# Patient Record
Sex: FEMALE | Race: WHITE
[De-identification: names, ages, dates, MRNs, and addresses within clinical notes are randomized per-mention and may not be internally consistent; named-entity substitution may affect disease eponyms.]

---

## 2021-02-04 ENCOUNTER — TRANSCRIPTION ENCOUNTER (OUTPATIENT)
Age: 76
End: 2021-02-04

## 2023-08-09 ENCOUNTER — INPATIENT (INPATIENT)
Facility: HOSPITAL | Age: 78
LOS: 7 days | Discharge: NURSING, REHAB OR LONG-TERM CARE | DRG: 64 | End: 2023-08-17
Attending: INTERNAL MEDICINE | Admitting: FAMILY MEDICINE
Payer: MEDICARE

## 2023-08-09 VITALS
SYSTOLIC BLOOD PRESSURE: 213 MMHG | TEMPERATURE: 98 F | DIASTOLIC BLOOD PRESSURE: 94 MMHG | HEART RATE: 98 BPM | OXYGEN SATURATION: 99 % | RESPIRATION RATE: 20 BRPM | WEIGHT: 220.02 LBS

## 2023-08-09 DIAGNOSIS — G93.41 METABOLIC ENCEPHALOPATHY: ICD-10-CM

## 2023-08-09 DIAGNOSIS — B96.20 UNSPECIFIED ESCHERICHIA COLI [E. COLI] AS THE CAUSE OF DISEASES CLASSIFIED ELSEWHERE: ICD-10-CM

## 2023-08-09 DIAGNOSIS — I63.89 OTHER CEREBRAL INFARCTION: ICD-10-CM

## 2023-08-09 DIAGNOSIS — N18.32 CHRONIC KIDNEY DISEASE, STAGE 3B: ICD-10-CM

## 2023-08-09 DIAGNOSIS — I66.02 OCCLUSION AND STENOSIS OF LEFT MIDDLE CEREBRAL ARTERY: ICD-10-CM

## 2023-08-09 DIAGNOSIS — R47.1 DYSARTHRIA AND ANARTHRIA: ICD-10-CM

## 2023-08-09 DIAGNOSIS — G93.40 ENCEPHALOPATHY, UNSPECIFIED: ICD-10-CM

## 2023-08-09 DIAGNOSIS — I67.2 CEREBRAL ATHEROSCLEROSIS: ICD-10-CM

## 2023-08-09 DIAGNOSIS — R47.81 SLURRED SPEECH: ICD-10-CM

## 2023-08-09 DIAGNOSIS — Z53.8 PROCEDURE AND TREATMENT NOT CARRIED OUT FOR OTHER REASONS: ICD-10-CM
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
## 2023-08-09 DIAGNOSIS — I12.9 HYPERTENSIVE CHRONIC KIDNEY DISEASE WITH STAGE 1 THROUGH STAGE 4 CHRONIC KIDNEY DISEASE, OR UNSPECIFIED CHRONIC KIDNEY DISEASE: ICD-10-CM

## 2023-08-09 DIAGNOSIS — N17.9 ACUTE KIDNEY FAILURE, UNSPECIFIED: ICD-10-CM
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
## 2023-08-09 DIAGNOSIS — S82.61XA DISPLACED FRACTURE OF LATERAL MALLEOLUS OF RIGHT FIBULA, INITIAL ENCOUNTER FOR CLOSED FRACTURE: ICD-10-CM

## 2023-08-09 DIAGNOSIS — I16.1 HYPERTENSIVE EMERGENCY: ICD-10-CM

## 2023-08-09 DIAGNOSIS — R29.704 NIHSS SCORE 4: ICD-10-CM

## 2023-08-09 DIAGNOSIS — R29.702 NIHSS SCORE 2: ICD-10-CM

## 2023-08-09 DIAGNOSIS — N39.0 URINARY TRACT INFECTION, SITE NOT SPECIFIED: ICD-10-CM

## 2023-08-09 DIAGNOSIS — R47.01 APHASIA: ICD-10-CM

## 2023-08-09 DIAGNOSIS — Z86.73 PERSONAL HISTORY OF TRANSIENT ISCHEMIC ATTACK (TIA), AND CEREBRAL INFARCTION WITHOUT RESIDUAL DEFICITS: ICD-10-CM

## 2023-08-09 DIAGNOSIS — I63.49 CEREBRAL INFARCTION DUE TO EMBOLISM OF OTHER CEREBRAL ARTERY: ICD-10-CM

## 2023-08-09 LAB
BASOPHILS # BLD AUTO: 0.07 K/UL — SIGNIFICANT CHANGE UP (ref 0–0.2)
BASOPHILS NFR BLD AUTO: 0.4 % — SIGNIFICANT CHANGE UP (ref 0–1)
EOSINOPHIL # BLD AUTO: 0.16 K/UL — SIGNIFICANT CHANGE UP (ref 0–0.7)
EOSINOPHIL NFR BLD AUTO: 1 % — SIGNIFICANT CHANGE UP (ref 0–8)
HCT VFR BLD CALC: 45.3 % — SIGNIFICANT CHANGE UP (ref 37–47)
HGB BLD-MCNC: 15.1 G/DL — SIGNIFICANT CHANGE UP (ref 12–16)
IMM GRANULOCYTES NFR BLD AUTO: 0.3 % — SIGNIFICANT CHANGE UP (ref 0.1–0.3)
LYMPHOCYTES # BLD AUTO: 1.47 K/UL — SIGNIFICANT CHANGE UP (ref 1.2–3.4)
LYMPHOCYTES # BLD AUTO: 9.4 % — LOW (ref 20.5–51.1)
MCHC RBC-ENTMCNC: 29.6 PG — SIGNIFICANT CHANGE UP (ref 27–31)
MCHC RBC-ENTMCNC: 33.3 G/DL — SIGNIFICANT CHANGE UP (ref 32–37)
MCV RBC AUTO: 88.8 FL — SIGNIFICANT CHANGE UP (ref 81–99)
MONOCYTES # BLD AUTO: 1.14 K/UL — HIGH (ref 0.1–0.6)
MONOCYTES NFR BLD AUTO: 7.3 % — SIGNIFICANT CHANGE UP (ref 1.7–9.3)
NEUTROPHILS # BLD AUTO: 12.76 K/UL — HIGH (ref 1.4–6.5)
NEUTROPHILS NFR BLD AUTO: 81.6 % — HIGH (ref 42.2–75.2)
NRBC # BLD: 0 /100 WBCS — SIGNIFICANT CHANGE UP (ref 0–0)
PLATELET # BLD AUTO: 249 K/UL — SIGNIFICANT CHANGE UP (ref 130–400)
PMV BLD: 10.4 FL — SIGNIFICANT CHANGE UP (ref 7.4–10.4)
RBC # BLD: 5.1 M/UL — SIGNIFICANT CHANGE UP (ref 4.2–5.4)
RBC # FLD: 13.3 % — SIGNIFICANT CHANGE UP (ref 11.5–14.5)
WBC # BLD: 15.64 K/UL — HIGH (ref 4.8–10.8)
WBC # FLD AUTO: 15.64 K/UL — HIGH (ref 4.8–10.8)

## 2023-08-09 PROCEDURE — 93010 ELECTROCARDIOGRAM REPORT: CPT

## 2023-08-09 PROCEDURE — 99285 EMERGENCY DEPT VISIT HI MDM: CPT

## 2023-08-10 DIAGNOSIS — R41.82 ALTERED MENTAL STATUS, UNSPECIFIED: ICD-10-CM

## 2023-08-10 LAB
ALBUMIN SERPL ELPH-MCNC: 4.2 G/DL — SIGNIFICANT CHANGE UP (ref 3.5–5.2)
ALP SERPL-CCNC: 117 U/L — HIGH (ref 30–115)
ALT FLD-CCNC: 17 U/L — SIGNIFICANT CHANGE UP (ref 0–41)
ANION GAP SERPL CALC-SCNC: 11 MMOL/L — SIGNIFICANT CHANGE UP (ref 7–14)
APPEARANCE UR: CLEAR — SIGNIFICANT CHANGE UP
AST SERPL-CCNC: 24 U/L — SIGNIFICANT CHANGE UP (ref 0–41)
BACTERIA # UR AUTO: ABNORMAL /HPF
BASE EXCESS BLDV CALC-SCNC: 2.1 MMOL/L — SIGNIFICANT CHANGE UP (ref -2–3)
BILIRUB SERPL-MCNC: 0.3 MG/DL — SIGNIFICANT CHANGE UP (ref 0.2–1.2)
BILIRUB UR-MCNC: NEGATIVE — SIGNIFICANT CHANGE UP
BUN SERPL-MCNC: 27 MG/DL — HIGH (ref 10–20)
CA-I SERPL-SCNC: 1.21 MMOL/L — SIGNIFICANT CHANGE UP (ref 1.15–1.33)
CALCIUM SERPL-MCNC: 9.6 MG/DL — SIGNIFICANT CHANGE UP (ref 8.4–10.5)
CHLORIDE SERPL-SCNC: 108 MMOL/L — SIGNIFICANT CHANGE UP (ref 98–110)
CO2 SERPL-SCNC: 24 MMOL/L — SIGNIFICANT CHANGE UP (ref 17–32)
COLOR SPEC: YELLOW — SIGNIFICANT CHANGE UP
CREAT SERPL-MCNC: 1.5 MG/DL — SIGNIFICANT CHANGE UP (ref 0.7–1.5)
DIFF PNL FLD: ABNORMAL
EGFR: 36 ML/MIN/1.73M2 — LOW
EPI CELLS # UR: ABNORMAL /HPF (ref 0–5)
FLUAV AG NPH QL: SIGNIFICANT CHANGE UP
FLUBV AG NPH QL: SIGNIFICANT CHANGE UP
GAS PNL BLDV: 139 MMOL/L — SIGNIFICANT CHANGE UP (ref 136–145)
GAS PNL BLDV: SIGNIFICANT CHANGE UP
GLUCOSE SERPL-MCNC: 116 MG/DL — HIGH (ref 70–99)
GLUCOSE UR QL: NEGATIVE MG/DL — SIGNIFICANT CHANGE UP
HCO3 BLDV-SCNC: 28 MMOL/L — SIGNIFICANT CHANGE UP (ref 22–29)
HCT VFR BLDA CALC: 47 % — SIGNIFICANT CHANGE UP (ref 39–51)
HGB BLD CALC-MCNC: 15.5 G/DL — SIGNIFICANT CHANGE UP (ref 12.6–17.4)
KETONES UR-MCNC: NEGATIVE — SIGNIFICANT CHANGE UP
LACTATE BLDV-MCNC: 1.8 MMOL/L — SIGNIFICANT CHANGE UP (ref 0.5–2)
LEUKOCYTE ESTERASE UR-ACNC: NEGATIVE — SIGNIFICANT CHANGE UP
LIDOCAIN IGE QN: 37 U/L — SIGNIFICANT CHANGE UP (ref 7–60)
MAGNESIUM SERPL-MCNC: 2.1 MG/DL — SIGNIFICANT CHANGE UP (ref 1.8–2.4)
NITRITE UR-MCNC: POSITIVE
PCO2 BLDV: 46 MMHG — HIGH (ref 39–42)
PH BLDV: 7.39 — SIGNIFICANT CHANGE UP (ref 7.32–7.43)
PH UR: 7 — SIGNIFICANT CHANGE UP (ref 5–8)
PO2 BLDV: 40 MMHG — SIGNIFICANT CHANGE UP
POTASSIUM BLDV-SCNC: 4.3 MMOL/L — SIGNIFICANT CHANGE UP (ref 3.5–5.1)
POTASSIUM SERPL-MCNC: 4.8 MMOL/L — SIGNIFICANT CHANGE UP (ref 3.5–5)
POTASSIUM SERPL-SCNC: 4.8 MMOL/L — SIGNIFICANT CHANGE UP (ref 3.5–5)
PROT SERPL-MCNC: 7.2 G/DL — SIGNIFICANT CHANGE UP (ref 6–8)
PROT UR-MCNC: NEGATIVE MG/DL — SIGNIFICANT CHANGE UP
RBC CASTS # UR COMP ASSIST: ABNORMAL /HPF (ref 0–4)
RSV RNA NPH QL NAA+NON-PROBE: SIGNIFICANT CHANGE UP
SAO2 % BLDV: 67.7 % — SIGNIFICANT CHANGE UP
SARS-COV-2 RNA SPEC QL NAA+PROBE: SIGNIFICANT CHANGE UP
SODIUM SERPL-SCNC: 143 MMOL/L — SIGNIFICANT CHANGE UP (ref 135–146)
SP GR SPEC: 1.01 — SIGNIFICANT CHANGE UP (ref 1.01–1.03)
TROPONIN T SERPL-MCNC: <0.01 NG/ML — SIGNIFICANT CHANGE UP
UROBILINOGEN FLD QL: 0.2 MG/DL — SIGNIFICANT CHANGE UP
WBC UR QL: ABNORMAL /HPF (ref 0–5)

## 2023-08-10 PROCEDURE — 93306 TTE W/DOPPLER COMPLETE: CPT

## 2023-08-10 PROCEDURE — 70498 CT ANGIOGRAPHY NECK: CPT | Mod: 26,MA

## 2023-08-10 PROCEDURE — 80061 LIPID PANEL: CPT

## 2023-08-10 PROCEDURE — 86225 DNA ANTIBODY NATIVE: CPT

## 2023-08-10 PROCEDURE — 70496 CT ANGIOGRAPHY HEAD: CPT | Mod: 26,MA

## 2023-08-10 PROCEDURE — 80053 COMPREHEN METABOLIC PANEL: CPT

## 2023-08-10 PROCEDURE — 92610 EVALUATE SWALLOWING FUNCTION: CPT | Mod: GN

## 2023-08-10 PROCEDURE — 86147 CARDIOLIPIN ANTIBODY EA IG: CPT

## 2023-08-10 PROCEDURE — 86780 TREPONEMA PALLIDUM: CPT

## 2023-08-10 PROCEDURE — 83036 HEMOGLOBIN GLYCOSYLATED A1C: CPT

## 2023-08-10 PROCEDURE — 93306 TTE W/DOPPLER COMPLETE: CPT | Mod: 26

## 2023-08-10 PROCEDURE — 80048 BASIC METABOLIC PNL TOTAL CA: CPT

## 2023-08-10 PROCEDURE — 86235 NUCLEAR ANTIGEN ANTIBODY: CPT

## 2023-08-10 PROCEDURE — 85025 COMPLETE CBC W/AUTO DIFF WBC: CPT

## 2023-08-10 PROCEDURE — 86803 HEPATITIS C AB TEST: CPT

## 2023-08-10 PROCEDURE — 86140 C-REACTIVE PROTEIN: CPT

## 2023-08-10 PROCEDURE — 82746 ASSAY OF FOLIC ACID SERUM: CPT

## 2023-08-10 PROCEDURE — 70450 CT HEAD/BRAIN W/O DYE: CPT | Mod: 26,XU,MA

## 2023-08-10 PROCEDURE — 97110 THERAPEUTIC EXERCISES: CPT | Mod: GO

## 2023-08-10 PROCEDURE — 70553 MRI BRAIN STEM W/O & W/DYE: CPT | Mod: 26

## 2023-08-10 PROCEDURE — 71045 X-RAY EXAM CHEST 1 VIEW: CPT | Mod: 26

## 2023-08-10 PROCEDURE — 70553 MRI BRAIN STEM W/O & W/DYE: CPT

## 2023-08-10 PROCEDURE — 83735 ASSAY OF MAGNESIUM: CPT

## 2023-08-10 PROCEDURE — 97535 SELF CARE MNGMENT TRAINING: CPT | Mod: GO

## 2023-08-10 PROCEDURE — 85652 RBC SED RATE AUTOMATED: CPT

## 2023-08-10 PROCEDURE — 97116 GAIT TRAINING THERAPY: CPT | Mod: GP

## 2023-08-10 PROCEDURE — 86255 FLUORESCENT ANTIBODY SCREEN: CPT

## 2023-08-10 PROCEDURE — 99223 1ST HOSP IP/OBS HIGH 75: CPT

## 2023-08-10 PROCEDURE — 73600 X-RAY EXAM OF ANKLE: CPT | Mod: RT

## 2023-08-10 PROCEDURE — 86431 RHEUMATOID FACTOR QUANT: CPT

## 2023-08-10 PROCEDURE — 36415 COLL VENOUS BLD VENIPUNCTURE: CPT

## 2023-08-10 PROCEDURE — 92507 TX SP LANG VOICE COMM INDIV: CPT | Mod: GN

## 2023-08-10 PROCEDURE — 95819 EEG AWAKE AND ASLEEP: CPT

## 2023-08-10 PROCEDURE — 92523 SPEECH SOUND LANG COMPREHEN: CPT | Mod: GN

## 2023-08-10 PROCEDURE — 97165 OT EVAL LOW COMPLEX 30 MIN: CPT | Mod: GO

## 2023-08-10 PROCEDURE — 99497 ADVNCD CARE PLAN 30 MIN: CPT | Mod: 25

## 2023-08-10 PROCEDURE — 97162 PT EVAL MOD COMPLEX 30 MIN: CPT | Mod: GP

## 2023-08-10 PROCEDURE — 84443 ASSAY THYROID STIM HORMONE: CPT

## 2023-08-10 PROCEDURE — 82607 VITAMIN B-12: CPT

## 2023-08-10 PROCEDURE — 92526 ORAL FUNCTION THERAPY: CPT | Mod: GN

## 2023-08-10 RX ORDER — HEPARIN SODIUM 5000 [USP'U]/ML
5000 INJECTION INTRAVENOUS; SUBCUTANEOUS EVERY 12 HOURS
Refills: 0 | Status: DISCONTINUED | OUTPATIENT
Start: 2023-08-10 | End: 2023-08-17

## 2023-08-10 RX ORDER — SODIUM CHLORIDE 9 MG/ML
1000 INJECTION INTRAMUSCULAR; INTRAVENOUS; SUBCUTANEOUS
Refills: 0 | Status: DISCONTINUED | OUTPATIENT
Start: 2023-08-10 | End: 2023-08-17

## 2023-08-10 RX ORDER — CLOPIDOGREL BISULFATE 75 MG/1
75 TABLET, FILM COATED ORAL DAILY
Refills: 0 | Status: DISCONTINUED | OUTPATIENT
Start: 2023-08-11 | End: 2023-08-17

## 2023-08-10 RX ORDER — AMLODIPINE BESYLATE 2.5 MG/1
2.5 TABLET ORAL DAILY
Refills: 0 | Status: DISCONTINUED | OUTPATIENT
Start: 2023-08-10 | End: 2023-08-10

## 2023-08-10 RX ORDER — ACETAMINOPHEN 500 MG
650 TABLET ORAL EVERY 6 HOURS
Refills: 0 | Status: DISCONTINUED | OUTPATIENT
Start: 2023-08-10 | End: 2023-08-17

## 2023-08-10 RX ORDER — ASPIRIN/CALCIUM CARB/MAGNESIUM 324 MG
81 TABLET ORAL DAILY
Refills: 0 | Status: DISCONTINUED | OUTPATIENT
Start: 2023-08-10 | End: 2023-08-17

## 2023-08-10 RX ORDER — CEFTRIAXONE 500 MG/1
1000 INJECTION, POWDER, FOR SOLUTION INTRAMUSCULAR; INTRAVENOUS EVERY 24 HOURS
Refills: 0 | Status: COMPLETED | OUTPATIENT
Start: 2023-08-10 | End: 2023-08-13

## 2023-08-10 RX ORDER — LABETALOL HCL 100 MG
10 TABLET ORAL ONCE
Refills: 0 | Status: COMPLETED | OUTPATIENT
Start: 2023-08-10 | End: 2023-08-11

## 2023-08-10 RX ORDER — CLOPIDOGREL BISULFATE 75 MG/1
300 TABLET, FILM COATED ORAL ONCE
Refills: 0 | Status: COMPLETED | OUTPATIENT
Start: 2023-08-10 | End: 2023-08-10

## 2023-08-10 RX ORDER — CEFTRIAXONE 500 MG/1
1000 INJECTION, POWDER, FOR SOLUTION INTRAMUSCULAR; INTRAVENOUS ONCE
Refills: 0 | Status: COMPLETED | OUTPATIENT
Start: 2023-08-10 | End: 2023-08-10

## 2023-08-10 RX ORDER — PANTOPRAZOLE SODIUM 20 MG/1
40 TABLET, DELAYED RELEASE ORAL
Refills: 0 | Status: DISCONTINUED | OUTPATIENT
Start: 2023-08-10 | End: 2023-08-17

## 2023-08-10 RX ORDER — AMLODIPINE BESYLATE 2.5 MG/1
5 TABLET ORAL DAILY
Refills: 0 | Status: DISCONTINUED | OUTPATIENT
Start: 2023-08-11 | End: 2023-08-16

## 2023-08-10 RX ORDER — ONDANSETRON 8 MG/1
4 TABLET, FILM COATED ORAL EVERY 6 HOURS
Refills: 0 | Status: DISCONTINUED | OUTPATIENT
Start: 2023-08-10 | End: 2023-08-17

## 2023-08-10 RX ORDER — ATORVASTATIN CALCIUM 80 MG/1
80 TABLET, FILM COATED ORAL AT BEDTIME
Refills: 0 | Status: DISCONTINUED | OUTPATIENT
Start: 2023-08-10 | End: 2023-08-17

## 2023-08-10 RX ORDER — LABETALOL HCL 100 MG
5 TABLET ORAL ONCE
Refills: 0 | Status: COMPLETED | OUTPATIENT
Start: 2023-08-10 | End: 2023-08-10

## 2023-08-10 RX ORDER — SENNA PLUS 8.6 MG/1
2 TABLET ORAL AT BEDTIME
Refills: 0 | Status: DISCONTINUED | OUTPATIENT
Start: 2023-08-10 | End: 2023-08-17

## 2023-08-10 RX ORDER — AMLODIPINE BESYLATE 2.5 MG/1
2.5 TABLET ORAL ONCE
Refills: 0 | Status: COMPLETED | OUTPATIENT
Start: 2023-08-10 | End: 2023-08-10

## 2023-08-10 RX ADMIN — PANTOPRAZOLE SODIUM 40 MILLIGRAM(S): 20 TABLET, DELAYED RELEASE ORAL at 06:14

## 2023-08-10 RX ADMIN — HEPARIN SODIUM 5000 UNIT(S): 5000 INJECTION INTRAVENOUS; SUBCUTANEOUS at 06:14

## 2023-08-10 RX ADMIN — AMLODIPINE BESYLATE 2.5 MILLIGRAM(S): 2.5 TABLET ORAL at 06:14

## 2023-08-10 RX ADMIN — HEPARIN SODIUM 5000 UNIT(S): 5000 INJECTION INTRAVENOUS; SUBCUTANEOUS at 17:44

## 2023-08-10 RX ADMIN — CEFTRIAXONE 100 MILLIGRAM(S): 500 INJECTION, POWDER, FOR SOLUTION INTRAMUSCULAR; INTRAVENOUS at 02:27

## 2023-08-10 RX ADMIN — Medication 5 MILLIGRAM(S): at 21:42

## 2023-08-10 RX ADMIN — ATORVASTATIN CALCIUM 80 MILLIGRAM(S): 80 TABLET, FILM COATED ORAL at 21:18

## 2023-08-10 RX ADMIN — CLOPIDOGREL BISULFATE 300 MILLIGRAM(S): 75 TABLET, FILM COATED ORAL at 18:54

## 2023-08-10 NOTE — H&P ADULT - NSHPPHYSICALEXAM_GEN_ALL_CORE
Vital Signs Last 24 Hrs  T(C): 36.9 (10 Aug 2023 02:34), Max: 36.9 (10 Aug 2023 02:34)  T(F): 98.5 (10 Aug 2023 02:34), Max: 98.5 (10 Aug 2023 02:34)  HR: 82 (10 Aug 2023 02:34) (82 - 98)  BP: 177/79 (10 Aug 2023 02:34) (177/79 - 213/94)  BP(mean): --  RR: 20 (09 Aug 2023 22:33) (20 - 20)  SpO2: 98% (10 Aug 2023 02:34) (98% - 99%)    Parameters below as of 10 Aug 2023 02:34  Patient On (Oxygen Delivery Method): room air      PHYSICAL EXAM-  GENERAL: NAD,   HEAD:  Atraumatic, Normocephalic  EYES: EOMI, PERRLA, conjunctiva and sclera clear  NECK: Supple, No JVD, Normal thyroid  NERVOUS SYSTEM:  alert but confused  CHEST/LUNG: Clear to percussion bilaterally; No rales, rhonchi, wheezing, or rubs  HEART: Regular rate and rhythm; No murmurs, rubs, or gallops  ABDOMEN: Soft, Nontender, Nondistended; Bowel sounds present  EXTREMITIES:   No clubbing, cyanosis, or edema  SKIN: No rashes or lesions

## 2023-08-10 NOTE — H&P ADULT - CONVERSATION DETAILS
Current plan of care, and prognosis were discussed with son/HCP  in details, all option were discussed in details  including CPR procedure, shock procedure, and intubation procedure.   Explained that duration of machines/inbutaion/pressor are unknown, and they are based on the underline issue.   son/HCP  opted for full code with full understanding of what it involves in details  time 30min

## 2023-08-10 NOTE — CHART NOTE - NSCHARTNOTEFT_GEN_A_CORE
78 year old female with hx of prior UTI who has not seen a physician in 20 years, on no meds, no cognitive impairment at baseline per family, presents to ED with son yesterday with complaint of confusion that began 8/9 around noon. She had a UTI in 2021 where she presented similarly and resolved with treatment. BP uncontrolled on presentation 213/94, no fever, WBC 15, mild SINCERE, alk phos 117, + UA for nitrites. CTH with chronic L occipital infarct, multiple age indeterminate R coronal radiata lacunar infarcts, and indeterminate confluent hypodensity in R frontal subcortical white matter. CT angio head/neck revealed focal occlusion prox M2 L MCA, irregular M2 R MCA stenosis, multifocal stenosis bilateral PCA. On exam patient is aphasic, does not answer name, age, location, answers only "fine, okay" to all questions, follows some commands, able to move all extremities, intact facial symmetry. No meningeal signs. NIHSS 4 today.      #Aphasia- r/o acute CVA   -transfer to telemetry monitoring  - q8h neuro checks  -ECHO  - maintain systolic blood pressure 140-180 (ok to do q8h per floor guidelines)   - continue ASA 81, Lipitor 80 mg  - labs: lipids, hgb a1c, tsh, rpr, b12  - CTH and CT angio head/neck noted  - obtain MRI brain with and without contrast (gentle hydration)     #AMS  #UTI  -f/u UCx  -c/w rocephin for now  - treat UTI, if patient does not improve, will probably need LP    #Hypertensive emergency- improved   -initially 213/94   -started on norvasc 2.5; will be on IVF prior to getting contrast so increased to norvasc 5mg qd  -keep -180 with CTA findings/possible ischemic stroke        #SINCERE vs CKD 3B  -no baseline creatinine  -getting gentle hydration; continue for 12 hours  -monitor Cr    #Progress Note Handoff  Pending (specify):  transfer to telemetry, ECHO, MRI brain, neuro f/u, clinical improvement, if doesn't get better with antibiotics may need LP  Family discussion: Updated son Salvatore at [phone]  Disposition: ACUTE 78 year old female with hx of prior UTI who has not seen a physician in 20 years, on no meds, no cognitive impairment at baseline per family, presents to ED with son yesterday with complaint of confusion that began 8/9 around noon. She had a UTI in 2021 where she presented similarly and resolved with treatment. BP uncontrolled on presentation 213/94, no fever, WBC 15, mild SINCERE, alk phos 117, + UA for nitrites. CTH with chronic L occipital infarct, multiple age indeterminate R coronal radiata lacunar infarcts, and indeterminate confluent hypodensity in R frontal subcortical white matter. CT angio head/neck revealed focal occlusion prox M2 L MCA, irregular M2 R MCA stenosis, multifocal stenosis bilateral PCA. On exam patient is aphasic, does not answer name, age, location, answers only "fine, okay" to all questions, follows some commands, able to move all extremities, intact facial symmetry. No meningeal signs. NIHSS 4 today.      #Aphasia- r/o acute CVA   -transfer to telemetry monitoring  - q8h neuro checks  -ECHO  - maintain systolic blood pressure 140-180 (ok to do q8h per floor guidelines)   - continue ASA 81, Lipitor 80 mg  - labs: lipids, hgb a1c, tsh, rpr, b12  - CTH and CT angio head/neck noted  - obtain MRI brain with and without contrast (gentle hydration)   -EEG    #AMS  #UTI  -f/u UCx  -c/w rocephin for now  - treat UTI, if patient does not improve, will probably need LP    #Hypertensive emergency- improved   -initially 213/94   -started on norvasc 2.5; will be on IVF prior to getting contrast so increased to norvasc 5mg qd  -keep -180 with CTA findings/possible ischemic stroke        #SINCERE vs CKD 3B  -no baseline creatinine  -getting gentle hydration; continue for 12 hours  -monitor Cr    #Progress Note Handoff  Pending (specify):  transfer to telemetry, ECHO, MRI brain, neuro f/u, clinical improvement, if doesn't get better with antibiotics may need LP  Family discussion: Updated son Salvatore at [phone]  Disposition: ACUTE 78 year old female with hx of prior UTI who has not seen a physician in 20 years, on no meds, no cognitive impairment at baseline per family, presents to ED with son yesterday with complaint of confusion that began 8/9 around noon. She had a UTI in 2021 where she presented similarly and resolved with treatment. BP uncontrolled on presentation 213/94, no fever, WBC 15, mild SINCERE, alk phos 117, + UA for nitrites. CTH with chronic L occipital infarct, multiple age indeterminate R coronal radiata lacunar infarcts, and indeterminate confluent hypodensity in R frontal subcortical white matter. CT angio head/neck revealed focal occlusion prox M2 L MCA, irregular M2 R MCA stenosis, multifocal stenosis bilateral PCA. On exam patient is aphasic, does not answer name, age, location, answers only "fine, okay" to all questions, follows some commands, able to move all extremities, intact facial symmetry. No meningeal signs. NIHSS 4 today.      #Aphasia- r/o acute CVA   -transfer to telemetry monitoring  - q8h neuro checks  -ECHO  - maintain systolic blood pressure 140-180 (ok to do q8h per floor guidelines)   - continue ASA 81, Lipitor 80 mg  - labs: lipids, hgb a1c, tsh, rpr, b12  - CTH and CT angio head/neck noted  - obtain MRI brain with and without contrast (gentle hydration)   -EEG    #AMS  #UTI  -f/u UCx  -c/w rocephin for now  - treat UTI, if patient does not improve, will probably need LP    #Hypertensive emergency- improved   -initially 213/94   -started on norvasc 2.5; will be on IVF prior to getting contrast so increased to norvasc 5mg qd  -keep -180 with CTA findings/possible ischemic stroke        #SINCERE vs CKD 3B  -no baseline creatinine  -getting gentle hydration; continue for 12 hours  -monitor Cr    #Progress Note Handoff  Pending (specify):  transfer to telemetry, ECHO, MRI brain, neuro f/u, clinical improvement, if doesn't get better with antibiotics may need LP  Family discussion: Updated son Salvatore at [phone]  Disposition: ACUTE      ***UPDATE @1751  MRI brain:   < from: MR Head w/wo IV Cont (08.10.23 @ 14:25) >    Acute infarcts in the left temporal lobe, left parietal lobe, and left   insular cortex.    Patchy subacute/acute infarct in the right frontal subcortical region   superimposed on small chronic lacunar infarcts.    Mild chronic microvascular ischemic changes and scattered chronic   infarcts.    Spoke with  neurology, will load with 300mg plavix and start 75mg tomorrow. Already on asa/statin. Will upgrade to ICU for q4h neurochecks. Approved by Dr. Barragan.

## 2023-08-10 NOTE — CONSULT NOTE ADULT - SUBJECTIVE AND OBJECTIVE BOX
BETITO ALEXANDER     78y     Female    MRN-[id number]                                                           CC:Patient is a 78y old  Female who presents with a chief complaint of AMS (10 Aug 2023 04:41)      HPI:  Patient is 79 yo female with hx of AMS in the past for UTI who has not seen a physician for over 20 yrs presenting with AMS that started yesterday, and progressivenly worsen.  patient is confused, unable to obtain ROS/HX    as per son, patient has been doing ok, yesterday morning, and around noon time, she started to get confused.  Offers no other complaints      (10 Aug 2023 04:41)      ROS:  Constitutional, Neurological, Psychiatric, Eyes, ENT, Cardiovascular, Respiratory, Gastrointestinal, Genitourinary, Musculoskeletal, Integumentary, Endocrine and Heme/Lymph are otherwise negative. Except for    Social History: No smoking, No drinking, No drug use    FAMILY HISTORY:      HEALTH ISSUES - PROBLEM Dx:          Vital Signs Last 24 Hrs  T(C): 35.9 (10 Aug 2023 05:31), Max: 36.9 (10 Aug 2023 02:34)  T(F): 96.7 (10 Aug 2023 05:31), Max: 98.5 (10 Aug 2023 02:34)  HR: 77 (10 Aug 2023 05:31) (77 - 98)  BP: 174/74 (10 Aug 2023 05:31) (174/74 - 213/94)  BP(mean): --  RR: 16 (10 Aug 2023 05:31) (16 - 20)  SpO2: 98% (10 Aug 2023 02:34) (98% - 99%)    Parameters below as of 10 Aug 2023 02:34  Patient On (Oxygen Delivery Method): room air          Neuro Exam:  Orientation: awake, alert, disoriented, answers "okay, fine" for  every question. follows some commands, does not give month and age  Attention: good attention  Language: no dysarthria, + aphasia  Fund of knowledge: unable to assess  Cranial Nerves:  visual fields full to confrontation, pupils equal round and reactive to light, extraocular motion intact, face symmetrical  Motor: muscle tone was normal in all four extremities, muscle strength was normal in all four extremities and normal bulk in all four extremities.   Sensory exam: light touch was intact.   Coordination:. no tremor present.       NIHSS: 4 ( 2 questions, aphasia)    Allergies    No Known Allergies          MEDICATIONS  (STANDING):  amLODIPine   Tablet 2.5 milliGRAM(s) Oral once  aspirin enteric coated 81 milliGRAM(s) Oral daily  atorvastatin 80 milliGRAM(s) Oral at bedtime  cefTRIAXone   IVPB 1000 milliGRAM(s) IV Intermittent every 24 hours  heparin   Injectable 5000 Unit(s) SubCutaneous every 12 hours  pantoprazole    Tablet 40 milliGRAM(s) Oral before breakfast  sodium chloride 0.9%. 1000 milliLiter(s) (50 mL/Hr) IV Continuous <Continuous>    MEDICATIONS  (PRN):  acetaminophen     Tablet .. 650 milliGRAM(s) Oral every 6 hours PRN Temp greater or equal to 38C (100.4F), Mild Pain (1 - 3)  ondansetron Injectable 4 milliGRAM(s) IV Push every 6 hours PRN Nausea  senna 2 Tablet(s) Oral at bedtime PRN Constipation      LABS:                        15.1   15.64 )-----------( 249      ( 09 Aug 2023 23:45 )             45.3     08-09    143  |  108  |  27<H>  ----------------------------<  116<H>  4.8   |  24  |  1.5    Ca    9.6      09 Aug 2023 23:45  Mg     2.1     08-09    TPro  7.2  /  Alb  4.2  /  TBili  0.3  /  DBili  x   /  AST  24  /  ALT  17  /  AlkPhos  117<H>  08-09      CT Head No Cont (08.10.23 @ 00:52)  1.  No definite acute intracranial pathology.  2.  Moderate sized chronic left occipital infarct.  3.  Multiple age-indeterminate lacunar infarcts along the right coronal   radiata.  4.  Indeterminate confluent hypodensity in the right frontal subcortical   white matter without mass effect. Nonspecific and differential can   include focus of gliosis, demyelination, chronic microvascular changes   amongst others. If symptoms persist brain MRI can be considered for   further evaluation.      CT Angio Neck w/ IV Cont (08.10.23 @ 00:52)   IMPRESSION:  1.  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  2.  Irregular stenosis involving multiple M2 branches of the right MCA   and distal right TUCKER.  3.  Multifocal stenosis in the bilateral posterior cerebral arteries.

## 2023-08-10 NOTE — ED PROVIDER NOTE - CLINICAL SUMMARY MEDICAL DECISION MAKING FREE TEXT BOX
78-year-old female no past medical history does not follow with primary doctor frequent UTIs most recent 2 months ago presents with son for confusion.  Patient lives with daughter noted patient to be herself this morning however during the afternoon daughter called and noticed patient seemed confused which is how she presents when she has a UTI.  Presents 78-year-old female no past medical history does not follow with primary doctor frequent UTIs most recent 2 months ago presents with son for confusion.  Patient lives with daughter noted patient to be herself this morning however during the afternoon daughter called and noticed patient seemed confused which is how she presents when she has a UTI.  PresentsWith his son for worsening confusion described as not knowing who the grandson was with concern for UTI.  In ED nonfocal 5 out of 5 strength all extremities patient does not answer questions appropriately when asked her name where she is and to identify objects patient repeats "I do not know if you are saying what you talking about.".  No trauma labs reviewed WBC 15 urine with positive nitrates 6-10 WBCs CT head, CTA head and neck No definite acute intracranial pathology.  	2.  Moderate sized chronic left occipital infarct.  	3.  Multiple age-indeterminate lacunar infarcts along the right coronal   	radiata.  	4.  Indeterminate confluent hypodensity in the right frontal subcortical   	white matter without mass effect. Nonspecific and differential can   	include focus of gliosis, demyelination, chronic microvascular changes   	amongst others. If symptoms persist brain MRI can be considered for   	further evaluation.  No definite acute intracranial pathology.  	2.  Moderate sized chronic left occipital infarct.  	3.  Multiple age-indeterminate lacunar infarcts along the right coronal   	radiata.  	4.  Indeterminate confluent hypodensity in the right frontal subcortical   	white matter without mass effect. Nonspecific and differential can   	include focus of gliosis, demyelination, chronic microvascular changes   	amongst others. If symptoms persist brain MRI can be considered for   	further evaluation. 78-year-old female no past medical history does not follow with primary doctor frequent UTIs most recent 2 months ago presents with son for confusion.  Patient lives with daughter noted patient to be herself this morning however during the afternoon daughter called and noticed patient seemed confused which is how she presents when she has a UTI.  PresentsWith his son for worsening confusion described as not knowing who the grandson was with concern for UTI.  In ED nonfocal 5 out of 5 strength all extremities patient does not answer questions appropriately when asked her name where she is and to identify objects patient repeats "I do not know if you are saying what you talking about.".  No trauma labs reviewed WBC 15 urine with positive nitrates 6-10 WBCs CT head, CTA head and neck No definite acute intracranial pathology.  	2.  Moderate sized chronic left occipital infarct.  	3.  Multiple age-indeterminate lacunar infarcts along the right coronal   	radiata.  	4.  Indeterminate confluent hypodensity in the right frontal subcortical   	white matter without mass effect. Nonspecific and differential can   	include focus of gliosis, demyelination, chronic microvascular changes   	amongst others. If symptoms persist brain MRI can be considered for   	further evaluation.  No definite acute intracranial pathology.  	2.  Moderate sized chronic left occipital infarct.  	3.  Multiple age-indeterminate lacunar infarcts along the right coronal   	radiata.  	4.  Indeterminate confluent hypodensity in the right frontal subcortical   	white matter without mass effect. Nonspecific and differential can   	include focus of gliosis, demyelination, chronic microvascular changes   	amongst others. If symptoms persist brain MRI can be considered for   	further evaluation.    Antibiotics given patient mated to medicine for neurology involvement.

## 2023-08-10 NOTE — ED PROVIDER NOTE - OBJECTIVE STATEMENT
78 year old female no sig past medical history (does not see doctor) comes to emergency room for confusion. As per son who is at bedside patient was noted this afternoon during a phone conversation to be confused, and he got home the confusion was worse. Unsure of last time well. Pt son brought to emergency room because he was concerned for UTI. patient as per son keeps saying she is fine and is unable to name objects.

## 2023-08-10 NOTE — ED ADULT NURSE NOTE - NSFALLHARMRISKINTERV_ED_ALL_ED

## 2023-08-10 NOTE — PATIENT PROFILE ADULT - FALL HARM RISK - HARM RISK INTERVENTIONS
Assistance with ambulation/Assistance OOB with selected safe patient handling equipment/Communicate Risk of Fall with Harm to all staff/Orthostatic vital signs/Reinforce activity limits and safety measures with patient and family/Tailored Fall Risk Interventions/Visual Cue: Yellow wristband and red socks/Bed in lowest position, wheels locked, appropriate side rails in place/Call bell, personal items and telephone in reach/Instruct patient to call for assistance before getting out of bed or chair/Non-slip footwear when patient is out of bed/[place] to call system/Physically safe environment - no spills, clutter or unnecessary equipment/Purposeful Proactive Rounding/Room/bathroom lighting operational, light cord in reach

## 2023-08-10 NOTE — CONSULT NOTE ADULT - ASSESSMENT
Patient is a 78 year old female with hx of prior UTI who has not seen a physician in 20 years, on no meds, no cognitive impairment at baseline per family, presents to ED with son yesterday with complaint of confusion that began 8/9 around noon. She had a UTI in 2021 where she presented similarly and resolved with treatment. BP uncontrolled on presentation 213/94, no fever, WBC 15, mild SINCERE, alk phos 117, + UA for nitrites. CTH with chronic L occipital infarct, multiple age indeterminate R coronal radiata lacunar infarcts, and indeterminate confluent hypodensity in R frontal subcortical white matter. CT angio head/neck revealed focal occlusion prox M2 L MCA, irregular M2 R MCA stenosis, multifocal stenosis bilateral PCA. On exam patient is aphasic, does not answer name, age, location, answers only "fine, okay" to all questions, follows some commands, able to move all extremities, intact facial symmetry. No meningeal signs. NIHSS 4 today.        # aphasia  # UTI   - telemetry  - f/u TTE  - continue ASA 81, Lipitor 80 mg  - labs: lipids, hgb a1c, tsh, rpr, b12  - CTH and CT angio head/neck noted  - obtain MRI brain with and without contrast    INCOMPLETE NOTE   Patient is a 78 year old female with hx of prior UTI who has not seen a physician in 20 years, on no meds, no cognitive impairment at baseline per family, presents to ED with son yesterday with complaint of confusion that began 8/9 around noon. She had a UTI in 2021 where she presented similarly and resolved with treatment. BP uncontrolled on presentation 213/94, no fever, WBC 15, mild SINCERE, alk phos 117, + UA for nitrites. CTH with chronic L occipital infarct, multiple age indeterminate R coronal radiata lacunar infarcts, and indeterminate confluent hypodensity in R frontal subcortical white matter. CT angio head/neck revealed focal occlusion prox M2 L MCA, irregular M2 R MCA stenosis, multifocal stenosis bilateral PCA. On exam patient is aphasic, does not answer name, age, location, answers only "fine, okay" to all questions, follows some commands, able to move all extremities, intact facial symmetry. No meningeal signs. NIHSS 4 today.      # aphasia  # UTI   - telemetry monitoring  - q8h neuro checks  - f/u TTE  - maintain systolic blood pressure 140-180  - continue ASA 81, Lipitor 80 mg  - labs: lipids, hgb a1c, tsh, rpr, b12  - CTH and CT angio head/neck noted  - obtain MRI brain with and without contrast  - treat UTI, if patient does not improve, will consider LP   Patient is a 78 year old female with hx of prior UTI who has not seen a physician in 20 years, on no meds, no cognitive impairment at baseline per family, presents to ED with son yesterday with complaint of confusion that began 8/9 around noon. She had a UTI in 2021 where she presented similarly and resolved with treatment. BP uncontrolled on presentation 213/94, no fever, WBC 15, mild SINCERE, alk phos 117, + UA for nitrites. CTH with chronic L occipital infarct, multiple age indeterminate R coronal radiata lacunar infarcts, and indeterminate confluent hypodensity in R frontal subcortical white matter. CT angio head/neck revealed focal occlusion prox M2 L MCA, irregular M2 R MCA stenosis, multifocal stenosis bilateral PCA. On exam patient is aphasic, does not answer name, age, location, answers only "fine, okay" to all questions, follows some commands, able to move all extremities, intact facial symmetry. No meningeal signs. NIHSS 4 today.      # aphasia  # UTI   - telemetry monitoring  - q8h neuro checks  - f/u TTE  - maintain systolic blood pressure 140-180  - continue ASA 81, Lipitor 80 mg  - labs: lipids, hgb a1c, tsh, rpr, b12  - CTH and CT angio head/neck noted  - obtain MRI brain with and without contrast  - treat UTI, if patient does not improve, will consider LP      updated note: MRI brain revealed acute infarcts, advised primary team to transfer to higher level of care for q4h neuro checks, load with Plavix 300 mg and continue 75 mg starting tomorrow. Added ESR and CRP to AM labs. Will follow tomorrow. Discussed with Dr. García and hospitalist.

## 2023-08-10 NOTE — ED PROVIDER NOTE - PHYSICAL EXAMINATION
Physical Exam    Vital Signs: I have reviewed the initial vital signs.  Constitutional: , no acute distress  Eyes: Conjunctiva pink, Sclera clear, PERRLA, EOMI.  Cardiovascular: S1 and S2, regular rate, regular rhythm, well-perfused extremities, radial pulses equal and 2+  Respiratory: unlabored respiratory effort, clear to auscultation bilaterally no wheezing, rales and rhonchi  Gastrointestinal: soft, non-tender abdomen, no pulsatile mass, normal bowl sounds  Musculoskeletal: supple neck, no lower extremity edema, no midline tenderness  Integumentary: warm, dry, no rash  Neurologic: awake, alert, orientated to person not time or place. patient is able to follow commands, but at times gets confused and states that she is unsure whats going on.  cranial nerves II-XII grossly intact, extremities’ motor and sensory functions grossly intact

## 2023-08-10 NOTE — H&P ADULT - HISTORY OF PRESENT ILLNESS
Patient is 77 yo female with hx of AMS in the past for UTI who has not seen a physician for over 20 yrs presenting with AMS that started yesterday, and progressivenly worsen.  patient is confused, unable to obtain ROS/HX    as per son, patient has been doing ok, yesterday morning, and around noon time, she started to get confused.  Offers no other complaints

## 2023-08-11 LAB
A1C WITH ESTIMATED AVERAGE GLUCOSE RESULT: 5.3 % — SIGNIFICANT CHANGE UP (ref 4–5.6)
ALBUMIN SERPL ELPH-MCNC: 3.7 G/DL — SIGNIFICANT CHANGE UP (ref 3.5–5.2)
ALP SERPL-CCNC: 104 U/L — SIGNIFICANT CHANGE UP (ref 30–115)
ALT FLD-CCNC: 21 U/L — SIGNIFICANT CHANGE UP (ref 0–41)
ANION GAP SERPL CALC-SCNC: 12 MMOL/L — SIGNIFICANT CHANGE UP (ref 7–14)
AST SERPL-CCNC: 20 U/L — SIGNIFICANT CHANGE UP (ref 0–41)
BASOPHILS # BLD AUTO: 0.04 K/UL — SIGNIFICANT CHANGE UP (ref 0–0.2)
BASOPHILS NFR BLD AUTO: 0.4 % — SIGNIFICANT CHANGE UP (ref 0–1)
BILIRUB SERPL-MCNC: 0.6 MG/DL — SIGNIFICANT CHANGE UP (ref 0.2–1.2)
BUN SERPL-MCNC: 24 MG/DL — HIGH (ref 10–20)
CALCIUM SERPL-MCNC: 9.1 MG/DL — SIGNIFICANT CHANGE UP (ref 8.4–10.5)
CHLORIDE SERPL-SCNC: 107 MMOL/L — SIGNIFICANT CHANGE UP (ref 98–110)
CHOLEST SERPL-MCNC: 175 MG/DL — SIGNIFICANT CHANGE UP
CO2 SERPL-SCNC: 22 MMOL/L — SIGNIFICANT CHANGE UP (ref 17–32)
CREAT SERPL-MCNC: 1.2 MG/DL — SIGNIFICANT CHANGE UP (ref 0.7–1.5)
CRP SERPL-MCNC: 18.8 MG/L — HIGH
EGFR: 47 ML/MIN/1.73M2 — LOW
EOSINOPHIL # BLD AUTO: 0.23 K/UL — SIGNIFICANT CHANGE UP (ref 0–0.7)
EOSINOPHIL NFR BLD AUTO: 2.3 % — SIGNIFICANT CHANGE UP (ref 0–8)
ERYTHROCYTE [SEDIMENTATION RATE] IN BLOOD: 7 MM/HR — SIGNIFICANT CHANGE UP (ref 0–20)
ESTIMATED AVERAGE GLUCOSE: 105 MG/DL — SIGNIFICANT CHANGE UP (ref 68–114)
FOLATE SERPL-MCNC: 13.7 NG/ML — SIGNIFICANT CHANGE UP
GLUCOSE SERPL-MCNC: 105 MG/DL — HIGH (ref 70–99)
HCT VFR BLD CALC: 43.1 % — SIGNIFICANT CHANGE UP (ref 37–47)
HCV AB S/CO SERPL IA: 0.04 COI — SIGNIFICANT CHANGE UP
HCV AB SERPL-IMP: SIGNIFICANT CHANGE UP
HDLC SERPL-MCNC: 47 MG/DL — LOW
HGB BLD-MCNC: 14.4 G/DL — SIGNIFICANT CHANGE UP (ref 12–16)
IMM GRANULOCYTES NFR BLD AUTO: 0.4 % — HIGH (ref 0.1–0.3)
LIPID PNL WITH DIRECT LDL SERPL: 110 MG/DL — HIGH
LYMPHOCYTES # BLD AUTO: 1.29 K/UL — SIGNIFICANT CHANGE UP (ref 1.2–3.4)
LYMPHOCYTES # BLD AUTO: 13 % — LOW (ref 20.5–51.1)
MCHC RBC-ENTMCNC: 29.6 PG — SIGNIFICANT CHANGE UP (ref 27–31)
MCHC RBC-ENTMCNC: 33.4 G/DL — SIGNIFICANT CHANGE UP (ref 32–37)
MCV RBC AUTO: 88.5 FL — SIGNIFICANT CHANGE UP (ref 81–99)
MONOCYTES # BLD AUTO: 1 K/UL — HIGH (ref 0.1–0.6)
MONOCYTES NFR BLD AUTO: 10.1 % — HIGH (ref 1.7–9.3)
NEUTROPHILS # BLD AUTO: 7.34 K/UL — HIGH (ref 1.4–6.5)
NEUTROPHILS NFR BLD AUTO: 73.8 % — SIGNIFICANT CHANGE UP (ref 42.2–75.2)
NON HDL CHOLESTEROL: 128 MG/DL — SIGNIFICANT CHANGE UP
NRBC # BLD: 0 /100 WBCS — SIGNIFICANT CHANGE UP (ref 0–0)
PLATELET # BLD AUTO: 241 K/UL — SIGNIFICANT CHANGE UP (ref 130–400)
PMV BLD: 10.6 FL — HIGH (ref 7.4–10.4)
POTASSIUM SERPL-MCNC: 4.2 MMOL/L — SIGNIFICANT CHANGE UP (ref 3.5–5)
POTASSIUM SERPL-SCNC: 4.2 MMOL/L — SIGNIFICANT CHANGE UP (ref 3.5–5)
PROT SERPL-MCNC: 6.2 G/DL — SIGNIFICANT CHANGE UP (ref 6–8)
RBC # BLD: 4.87 M/UL — SIGNIFICANT CHANGE UP (ref 4.2–5.4)
RBC # FLD: 13.7 % — SIGNIFICANT CHANGE UP (ref 11.5–14.5)
SODIUM SERPL-SCNC: 141 MMOL/L — SIGNIFICANT CHANGE UP (ref 135–146)
T PALLIDUM AB TITR SER: NEGATIVE — SIGNIFICANT CHANGE UP
TRIGL SERPL-MCNC: 89 MG/DL — SIGNIFICANT CHANGE UP
TSH SERPL-MCNC: 1.28 UIU/ML — SIGNIFICANT CHANGE UP (ref 0.27–4.2)
VIT B12 SERPL-MCNC: 304 PG/ML — SIGNIFICANT CHANGE UP (ref 232–1245)
WBC # BLD: 9.94 K/UL — SIGNIFICANT CHANGE UP (ref 4.8–10.8)
WBC # FLD AUTO: 9.94 K/UL — SIGNIFICANT CHANGE UP (ref 4.8–10.8)

## 2023-08-11 PROCEDURE — 73600 X-RAY EXAM OF ANKLE: CPT | Mod: 26,RT

## 2023-08-11 PROCEDURE — 99232 SBSQ HOSP IP/OBS MODERATE 35: CPT

## 2023-08-11 PROCEDURE — 99222 1ST HOSP IP/OBS MODERATE 55: CPT

## 2023-08-11 PROCEDURE — 95816 EEG AWAKE AND DROWSY: CPT | Mod: 26

## 2023-08-11 PROCEDURE — 99291 CRITICAL CARE FIRST HOUR: CPT

## 2023-08-11 RX ORDER — LABETALOL HCL 100 MG
10 TABLET ORAL EVERY 8 HOURS
Refills: 0 | Status: DISCONTINUED | OUTPATIENT
Start: 2023-08-11 | End: 2023-08-17

## 2023-08-11 RX ADMIN — CEFTRIAXONE 100 MILLIGRAM(S): 500 INJECTION, POWDER, FOR SOLUTION INTRAMUSCULAR; INTRAVENOUS at 23:45

## 2023-08-11 RX ADMIN — ATORVASTATIN CALCIUM 80 MILLIGRAM(S): 80 TABLET, FILM COATED ORAL at 21:31

## 2023-08-11 RX ADMIN — AMLODIPINE BESYLATE 5 MILLIGRAM(S): 2.5 TABLET ORAL at 06:37

## 2023-08-11 RX ADMIN — HEPARIN SODIUM 5000 UNIT(S): 5000 INJECTION INTRAVENOUS; SUBCUTANEOUS at 19:20

## 2023-08-11 RX ADMIN — PANTOPRAZOLE SODIUM 40 MILLIGRAM(S): 20 TABLET, DELAYED RELEASE ORAL at 06:06

## 2023-08-11 RX ADMIN — CEFTRIAXONE 100 MILLIGRAM(S): 500 INJECTION, POWDER, FOR SOLUTION INTRAMUSCULAR; INTRAVENOUS at 00:25

## 2023-08-11 RX ADMIN — HEPARIN SODIUM 5000 UNIT(S): 5000 INJECTION INTRAVENOUS; SUBCUTANEOUS at 06:06

## 2023-08-11 RX ADMIN — Medication 81 MILLIGRAM(S): at 12:10

## 2023-08-11 RX ADMIN — CLOPIDOGREL BISULFATE 75 MILLIGRAM(S): 75 TABLET, FILM COATED ORAL at 12:10

## 2023-08-11 RX ADMIN — Medication 10 MILLIGRAM(S): at 16:34

## 2023-08-11 NOTE — SPEECH LANGUAGE PATHOLOGY EVALUATION - SLP DIAGNOSIS
+ MOD expressive/receptive aphasia characterized by frequent phonemic paraphasias, anomia, poor repetition, fluent speech, and relatively intact auditory comprehension with some difficulties in multi step command following. Deficits most consistent with Conductive Aphasia. Pt has awareness of deficits and often becomes frustrated.

## 2023-08-11 NOTE — SPEECH LANGUAGE PATHOLOGY EVALUATION - DESCRIBE:
When SLP reviewed the question with her she laughed at the mistake, but still had difficulty expressing "no" for "are you a man". Appears to be more related to perseverating on "yes" verbally, rather than not knowing the answer to the question, because later pt laughed at her answer.

## 2023-08-11 NOTE — CHART NOTE - NSCHARTNOTEFT_GEN_A_CORE
Patient examined, with significantly improved aphasia, now mild expressive aphasia. Also noted to have R hemianopia. NIHSS 2. Otherwise no deficits.    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 García

## 2023-08-11 NOTE — PROGRESS NOTE ADULT - ASSESSMENT
78 year old female with hx of prior UTI who has not seen a physician in 20 years, on no meds, no cognitive impairment at baseline per family, presents to ED with son yesterday with complaint of confusion that began 8/9 around noon. She had a UTI in 2021 where she presented similarly and resolved with treatment. BP uncontrolled on presentation 213/94, no fever, WBC 15, mild SINCERE, alk phos 117, + UA for nitrites. CTH with chronic L occipital infarct, multiple age indeterminate R coronal radiata lacunar infarcts, and indeterminate confluent hypodensity in R frontal subcortical white matter. CT angio head/neck revealed focal occlusion prox M2 L MCA, irregular M2 R MCA stenosis, multifocal stenosis bilateral PCA. On exam on presentation patient was aphasic, does not answer name, age, location, answers only "fine, okay" to all questions, follows some commands, able to move all extremities, intact facial symmetry. No meningeal signs. MRI reveals Acute infarcts in the left temporal lobe, left parietal lobe, and left insular cortex. Patchy subacute/acute infarct in the right frontal subcortical region superimposed on small chronic lacunar infarcts. Loaded with ASA and plavix.     #Aphasia- r/o acute CVA   - transfer to telemetry monitoring  - q8h neuro checks  - ECHO  - maintain systolic blood pressure 140-180 (ok to do q8h per floor guidelines)   - continue ASA 81, Lipitor 80 mg  - labs: lipids, hgb a1c, tsh, rpr, b12  - CTH and CT angio head/neck noted  - obtain MRI brain with and without contrast (gentle hydration)   - EEG    #AMS  #UTI  -f/u UCx  -c/w rocephin for now  - treat UTI, if patient does not improve, will probably need LP    #Hypertensive emergency- improved   -initially 213/94   -started on norvasc 2.5; will be on IVF prior to getting contrast so increased to norvasc 5mg qd  -keep -180 with CTA findings/possible ischemic stroke        #SINCERE vs CKD 3B  -no baseline creatinine  -getting gentle hydration; continue for 12 hours  -monitor Cr    #Progress Note Handoff  Pending (specify):  neuro follow up   Disposition: ICU neuro checks        78 year old female with hx of prior UTI who has not seen a physician in 20 years, on no meds, no cognitive impairment at baseline per family, presents to ED with son yesterday with complaint of confusion that began 8/9 around noon. She had a UTI in 2021 where she presented similarly and resolved with treatment. BP uncontrolled on presentation 213/94, no fever, WBC 15, mild SINCERE, alk phos 117, + UA for nitrites. CTH with chronic L occipital infarct, multiple age indeterminate R coronal radiata lacunar infarcts, and indeterminate confluent hypodensity in R frontal subcortical white matter. CT angio head/neck revealed focal occlusion prox M2 L MCA, irregular M2 R MCA stenosis, multifocal stenosis bilateral PCA. On exam on presentation patient was aphasic, does not answer name, age, location, answers only "fine, okay" to all questions, follows some commands, able to move all extremities, intact facial symmetry. No meningeal signs. MRI reveals Acute infarcts in the left temporal lobe, left parietal lobe, and left insular cortex. Patchy subacute/acute infarct in the right frontal subcortical region superimposed on small chronic lacunar infarcts. Loaded with ASA and plavix.     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 in setting of intracranial vasculopathy less likely vasculitis  - q4h neuro checks  - telemetry monitoring  - Obtain TTE and GERARDO  - needs MCOT/loop recorder on D/C  - continue DAPT  - continue high dose statin  - obtain: dsDNA, RF, ANCA, anticardiolipin, Sjogren  - maintain systolic -180  - PT/OT/P&M/S&S    #UTI  -f/u UCx  -c/w rocephin day 2/3    #Hypertensive emergency- resolved  -not on home meds   -c/w amlodipine 5mg qd  -keep -180 in setting of acute CVA    #SINCERE vs CKD 3B  -no baseline creatinine  -monitor Cr    #Misc  - DVT Prophylaxis:heparinsubq  - Diet:DASH  - Activity:AAT  Pending: TTE,GERARDO, labs, S/S,PTOT  Disposition: SDU for neuro checks

## 2023-08-11 NOTE — PROGRESS NOTE ADULT - ASSESSMENT
78 year old female with no medical history (does not see doctor) comes to emergency room for confusion. As per son patient was noted  during a phone conversation to be confused, and when he got home the confusion was worse. Unsure of last time well.    acute on chronic CVA / possible UTI     - aspirin, Plavix and Lipitor   - PT/OT/SLP   - check TTE   - tele   - continue Rocephin for now and check urine culture results   - DVT prophylaxis

## 2023-08-11 NOTE — PHYSICAL THERAPY INITIAL EVALUATION ADULT - ADDITIONAL COMMENTS
pt lives with her daughter in a private house with 1 platform step to enter and 3+10 steps upstairs to bedroom and bathroom area with rails.  Pt amb without AD prior to admission

## 2023-08-11 NOTE — OCCUPATIONAL THERAPY INITIAL EVALUATION ADULT - LEVEL OF INDEPENDENCE: TOILET, REHAB EVAL
+ primafit; not performed 2/2 right ankle pain; ABBY Menchaca made aware; pending X-Ray; to f/u when appropriate

## 2023-08-11 NOTE — OCCUPATIONAL THERAPY INITIAL EVALUATION ADULT - VISUAL ASSESSMENT: VISUAL FIELD CUTS
attempted to perform; not fully assessed 2/2 pt unable to follow commands; to be assessed when appropriate

## 2023-08-11 NOTE — OCCUPATIONAL THERAPY INITIAL EVALUATION ADULT - LEVEL OF INDEPENDENCE: BED TO CHAIR, REHAB EVAL
not performed 2/2 right ankle pain; ABBY Menchaca made aware; pending X-Ray; to f/u when appropriate

## 2023-08-11 NOTE — PHYSICAL THERAPY INITIAL EVALUATION ADULT - PERTINENT HX OF CURRENT PROBLEM, REHAB EVAL
Patient is 79 yo female with hx of AMS in the past for UTI who has not seen a physician for over 20 yrs presenting with AMS that started yesterday, and progressively worsen  Confirmed Stroke as per MRI: L temporal and parietal lobes  X-Ray of the L foot: mild displaced distal fibular fx

## 2023-08-11 NOTE — CHART NOTE - NSCHARTNOTEFT_GEN_A_CORE
Called by team to evaluate for GERARDO. 79 yo w no PMH pw acute CVA-MRI-acute left sided infarct.   EKG -NSR, tele-no AF per NP   Pt will be added to GERARDO schedule on 8/14.   NPO past midnight on Sunday

## 2023-08-11 NOTE — SPEECH LANGUAGE PATHOLOGY EVALUATION - COMMENTS
Relative strength CTH with chronic L occipital infarct, multiple age indeterminate R coronal radiata lacunar infarcts, and indeterminate confluent hypodensity in R frontal subcortical white matter. CT angio head/neck revealed focal occlusion prox M2 L MCA, irregular M2 R MCA stenosis, multifocal stenosis bilateral PCA.    MRI: Acute infarcts in the left temporal lobe, left parietal lobe, and left insular cortex.  Patchy subacute/acute infarct in the right frontal subcortical region superimposed on small chronic lacunar infarcts.  Mild chronic microvascular ischemic changes and scattered chronic infarcts.    NIHSS 4 today. AUD COMP deficits, however overall this is a relative strength compared to her expressive abilities. within functional limits More testing required. 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

## 2023-08-11 NOTE — SPEECH LANGUAGE PATHOLOGY EVALUATION - SLP AUTOMATIC SPEECH
Initially had difficulty w/ days of week, but when asked to try again she was 100% accurate./intact/counting/days of the week/months of the year

## 2023-08-11 NOTE — OCCUPATIONAL THERAPY INITIAL EVALUATION ADULT - LIVES WITH, PROFILE
with daughter and brother in a private home with 1 step to enter + 3 steps inside + flight inside right side handrail to main living area + walk in shower + standard toilet/children/other relative

## 2023-08-11 NOTE — OCCUPATIONAL THERAPY INITIAL EVALUATION ADULT - PERSONAL SAFETY AND JUDGMENT, REHAB EVAL
Patient wants to know why she needs to come back.  She said that she was informed at her last appointment that she was cleared for surgery.  Patient is requesting a phone call back asap.  She can be reached anytime at [phone], and it is ok to leave a detailed message.  Thank you.     No deficits noted at this time.

## 2023-08-11 NOTE — PHYSICAL THERAPY INITIAL EVALUATION ADULT - FOLLOWS COMMANDS/ANSWERS QUESTIONS, REHAB EVAL
has difficulties with multistep commands and requires several time demonstration of the activity in order to perform/50% of the time/able to follow single-step instructions

## 2023-08-11 NOTE — SPEECH LANGUAGE PATHOLOGY EVALUATION - SLP PERTINENT HISTORY OF CURRENT PROBLEM
78 year old female with hx of prior UTI who has not seen a physician in 20 years, on no meds, no cognitive impairment at baseline per family, presents to ED with son yesterday with complaint of confusion that began 8/9 around noon. She had a UTI in 2021 where she presented similarly and resolved with treatment. BP uncontrolled on presentation 213/94, no fever, WBC 15, mild SINCERE, alk phos 117, + UA for nitrites.

## 2023-08-11 NOTE — CONSULT NOTE ADULT - SUBJECTIVE AND OBJECTIVE BOX
HPI:  Patient is 79 yo female with hx of AMS in the past for UTI who has not seen a physician for over 20 yrs presenting with AMS that started yesterday, and progressivenly worsen.  patient is confused, unable to obtain ROS/HX    as per son, patient has been doing ok, yesterday morning, and around noon time, she started to get confused.  Offers no other complaints      (10 Aug 2023 04:41)        Cardiology Update: 77 y/o f w/ no significant pmh who presents for confusion admitted for acute CVA,. Cardiology consulted for GERARDO eval r/o cardioembolic event. Patient examined with DTR at bedside, offers no acute complaints, still forgetful with mild expressive aphasia. Denies history of cardiac disease, does not have a cardiologist.             PAST MEDICAL & SURGICAL HISTORY      FAMILY HISTORY:  FAMILY HISTORY:      SOCIAL HISTORY:  []smoker  []Alcohol  []Drug    ALLERGIES:  No Known Allergies      MEDICATIONS:  MEDICATIONS  (STANDING):  amLODIPine   Tablet 5 milliGRAM(s) Oral daily  aspirin enteric coated 81 milliGRAM(s) Oral daily  atorvastatin 80 milliGRAM(s) Oral at bedtime  cefTRIAXone   IVPB 1000 milliGRAM(s) IV Intermittent every 24 hours  clopidogrel Tablet 75 milliGRAM(s) Oral daily  heparin   Injectable 5000 Unit(s) SubCutaneous every 12 hours  pantoprazole    Tablet 40 milliGRAM(s) Oral before breakfast  sodium chloride 0.9%. 1000 milliLiter(s) (50 mL/Hr) IV Continuous <Continuous>    MEDICATIONS  (PRN):  acetaminophen     Tablet .. 650 milliGRAM(s) Oral every 6 hours PRN Temp greater or equal to 38C (100.4F), Mild Pain (1 - 3)  labetalol Injectable 10 milliGRAM(s) IV Push once PRN Systolic blood pressure >180mmHg  ondansetron Injectable 4 milliGRAM(s) IV Push every 6 hours PRN Nausea  senna 2 Tablet(s) Oral at bedtime PRN Constipation      HOME MEDICATIONS:  Home Medications:      VITALS:   T(F): 97.9 (08-11 @ 07:05), Max: 98.5 (08-10 @ 02:34)  HR: 84 (08-11 @ 08:00) (64 - 98)  BP: 148/93 (08-11 @ 08:00) (139/63 - 213/94)  BP(mean): 114 (08-11 @ 08:00) (90 - 144)  RR: 18 (08-11 @ 08:00) (16 - 24)  SpO2: 94% (08-11 @ 08:00) (93% - 99%)    I&O's Summary    10 Aug 2023 07:01  -  11 Aug 2023 07:00  --------------------------------------------------------  IN: 50 mL / OUT: 200 mL / NET: -150 mL    11 Aug 2023 07:01  -  11 Aug 2023 12:04  --------------------------------------------------------  IN: 260 mL / OUT: 0 mL / NET: 260 mL        REVIEW OF SYSTEMS:  CONSTITUTIONAL: No weakness, fevers or chills  EYES: No visual changes  ENT: No vertigo or throat pain   NECK: No pain or stiffness  RESPIRATORY: No cough, wheezing, hemoptysis; No shortness of breath  CARDIOVASCULAR: No chest pain or palpitations  GASTROINTESTINAL: No abdominal or epigastric pain. No nausea, vomiting, or hematemesis; No diarrhea or constipation. No melena or hematochezia.  GENITOURINARY: No dysuria, frequency or hematuria  NEUROLOGICAL: No numbness or weakness  SKIN: No itching, no rashes  MSK: no    PHYSICAL EXAM:  NEURO: patient is awake , alert and oriented  GEN: Not in acute distress  NECK: no thyroid enlargement, no JVD  LUNGS: Clear to auscultation bilaterally   CARDIOVASCULAR: S1/S2 present, RRR , no murmurs or rubs, no carotid bruits,  + PP bilaterally  ABD: Soft, non-tender, non-distended, +BS  EXT: No DAVID  SKIN: Intact    LABS:                        14.4   9.94  )-----------( 241      ( 11 Aug 2023 05:51 )             43.1     08-11    141  |  107  |  24<H>  ----------------------------<  105<H>  4.2   |  22  |  1.2    Ca    9.1      11 Aug 2023 05:51  Mg     2.1     08-09    TPro  6.2  /  Alb  3.7  /  TBili  0.6  /  DBili  x   /  AST  20  /  ALT  21  /  AlkPhos  104  08-11      Sedimentation Rate, Erythrocyte: 7 mm/Hr (08-11-23 @ 05:51)    CARDIAC MARKERS ( 09 Aug 2023 23:45 )  x     / <0.01 ng/mL / x     / x     / x            Troponin trend:      08-11 Chol 175 LDL -- HDL 47<L> Trig 89      RADIOLOGY:    < from: MR Head w/wo IV Cont (08.10.23 @ 14:25) >  IMPRESSION:    Acute infarcts in the left temporal lobe, left parietal lobe, and left   insular cortex.    Patchy subacute/acute infarct in the right frontal subcortical region   superimposed on small chronic lacunar infarcts.    Mild chronic microvascular ischemic changes and scattered chronic   infarcts.    --- End of Report ---    < end of copied text >      ECG:    TELEMETRY EVENTS:

## 2023-08-11 NOTE — OCCUPATIONAL THERAPY INITIAL EVALUATION ADULT - PERTINENT HX OF CURRENT PROBLEM, REHAB EVAL
Patient is 77 yo female with hx of AMS in the past for UTI who has not seen a physician for over 20 yrs presenting with AMS that started yesterday, and progressively worsen.  patient is confused as per 8/10/23, unable to obtain ROS/HX. As per son, patient has been doing ok yesterday morning (8/9/23), and around noon time, she started to get confused.  Offers no other complaints.      MRI 8/10/23:   Acute infarcts in the left temporal lobe, left parietal lobe, and left insular cortex.   Patchy subacute/acute infarct in the right frontal subcortical region superimposed on small chronic lacunar infarcts.   Mild chronic microvascular ischemic changes and scattered chronic infarcts.

## 2023-08-11 NOTE — OCCUPATIONAL THERAPY INITIAL EVALUATION ADULT - GENERAL OBSERVATIONS, REHAB EVAL
10:00-10:30; Chart reviewed, ok to treat by Occupational Therapist as confirmed by ABBY Menchaca, Pt received semi spencer +RUE heplock +primafit +tele in NAD. Pt reported no pain at rest, unquantified right ankle pain upon activity, ABBY Menchaca aware. Pt in agreement with OT IE.

## 2023-08-11 NOTE — CONSULT NOTE ADULT - ASSESSMENT
77 y/o f w/ no significant pmh who presents for confusion admitted for acute CVA. Cardiology consulted for GERARDO eval r/o cardioembolic event.    MRA 08/10: Acute infarcts in the left temporal lobe, left parietal lobe, and left   insular cortex.    Patchy subacute/acute infarct in the right frontal subcortical region   superimposed on small chronic lacunar infarcts.    Mild chronic microvascular ischemic changes and scattered chronic   infarcts.    Plan  - will plan for GERARDO at [facility] for Mon 8/14  - npo except meds after midnight on Sun  - continue telemonitoring  - will need 30-day MCOT, device to be sent directly to patient's home on dc  - Antiplatelet/Statins per neuro  - outpatient f/u

## 2023-08-11 NOTE — PROGRESS NOTE ADULT - SUBJECTIVE AND OBJECTIVE BOX
BETITO ALEXANDER 78y Female  MRN#: [id number]   Hospital Day: 1d    SUBJECTIVE  Patient is a 78y old Female who presents with a chief complaint of AMS (10 Aug 2023 10:53)  Currently admitted to medicine with the primary diagnosis of AMS (altered mental status)      INTERVAL HPI AND OVERNIGHT EVENTS:  Patient was examined and seen at bedside. This morning she is resting comfortably in bed. No issues or overnight events.    OBJECTIVE  PAST MEDICAL & SURGICAL HISTORY    ALLERGIES:  No Known Allergies    MEDICATIONS:  STANDING MEDICATIONS  amLODIPine   Tablet 5 milliGRAM(s) Oral daily  aspirin enteric coated 81 milliGRAM(s) Oral daily  atorvastatin 80 milliGRAM(s) Oral at bedtime  cefTRIAXone   IVPB 1000 milliGRAM(s) IV Intermittent every 24 hours  clopidogrel Tablet 75 milliGRAM(s) Oral daily  heparin   Injectable 5000 Unit(s) SubCutaneous every 12 hours  pantoprazole    Tablet 40 milliGRAM(s) Oral before breakfast  sodium chloride 0.9%. 1000 milliLiter(s) IV Continuous <Continuous>    PRN MEDICATIONS  acetaminophen     Tablet .. 650 milliGRAM(s) Oral every 6 hours PRN  labetalol Injectable 10 milliGRAM(s) IV Push once PRN  ondansetron Injectable 4 milliGRAM(s) IV Push every 6 hours PRN  senna 2 Tablet(s) Oral at bedtime PRN      VITAL SIGNS: Last 24 Hours  T(C): 36.6 (11 Aug 2023 07:05), Max: 36.8 (10 Aug 2023 20:00)  T(F): 97.9 (11 Aug 2023 07:05), Max: 98.3 (10 Aug 2023 20:00)  HR: 64 (11 Aug 2023 04:00) (64 - 80)  BP: 139/63 (11 Aug 2023 04:00) (139/63 - 195/82)  BP(mean): 90 (11 Aug 2023 04:00) (90 - 144)  RR: 27 (11 Aug 2023 07:05) (16 - 27)  SpO2: 93% (11 Aug 2023 04:00) (93% - 97%)    LABS:                        14.4   9.94  )-----------( 241      ( 11 Aug 2023 05:51 )             43.1     08-11    141  |  107  |  24<H>  ----------------------------<  105<H>  4.2   |  22  |  1.2    Ca    9.1      11 Aug 2023 05:51  Mg     2.1     08-09    TPro  6.2  /  Alb  3.7  /  TBili  0.6  /  DBili  x   /  AST  20  /  ALT  21  /  AlkPhos  104  08-11      Urinalysis Basic - ( 11 Aug 2023 05:51 )    Color: x / Appearance: x / SG: x / pH: x  Gluc: 105 mg/dL / Ketone: x  / Bili: x / Urobili: x   Blood: x / Protein: x / Nitrite: x   Leuk Esterase: x / RBC: x / WBC x   Sq Epi: x / Non Sq Epi: x / Bacteria: x            CARDIAC MARKERS ( 09 Aug 2023 23:45 )  x     / <0.01 ng/mL / x     / x     / x          RADIOLOGY:      PHYSICAL EXAM:  CONSTITUTIONAL: No acute distress, well-developed, well-groomed, AAOx3  HEAD: Atraumatic, normocephalic  EYES: EOM intact, PERRLA, conjunctiva and sclera clear  ENT: Supple, no masses, no thyromegaly, no bruits, no JVD; moist mucous membranes  PULMONARY: Clear to auscultation bilaterally; no wheezes, rales, or rhonchi  CARDIOVASCULAR: Regular rate and rhythm; no murmurs, rubs, or gallops  GASTROINTESTINAL: Soft, non-tender, non-distended; bowel sounds present  MUSCULOSKELETAL: 2+ peripheral pulses; no clubbing, no cyanosis, no edema  NEUROLOGY: non-focal  SKIN: No rashes or lesions; warm and dry

## 2023-08-11 NOTE — PROGRESS NOTE ADULT - SUBJECTIVE AND OBJECTIVE BOX
Patient seen and evaluated this am, comfortable in bed, speaking clearly, no complaints       T(F): 97.9 (08-11-23 @ 07:05), Max: 98.3 (08-10-23 @ 20:00)  HR: 64 (08-11-23 @ 04:00)  BP: 139/63 (08-11-23 @ 04:00)  RR: 20  SpO2: 93% (08-11-23 @ 04:00) (93% - 97%)    PHYSICAL EXAM:  GENERAL: NAD  HEAD:  Atraumatic, Normocephalic  EYES: EOMI, PERRLA, conjunctiva and sclera clear  NERVOUS SYSTEM:  no focal deficits   CHEST/LUNG: Clear to percussion bilaterally; No rales, rhonchi, wheezing, or rubs  HEART: Regular rate and rhythm; No murmurs, rubs, or gallops  ABDOMEN: Soft, Nontender, Nondistended; Bowel sounds present  EXTREMITIES:  2+ Peripheral Pulses, No clubbing, cyanosis, or edema    LABS  08-11    141  |  107  |  24<H>  ----------------------------<  105<H>  4.2   |  22  |  1.2    Ca    9.1      11 Aug 2023 05:51  Mg     2.1     08-09    TPro  6.2  /  Alb  3.7  /  TBili  0.6  /  DBili  x   /  AST  20  /  ALT  21  /  AlkPhos  104  08-11                          14.4   9.94  )-----------( 241      ( 11 Aug 2023 05:51 )             43.1       CARDIAC ENZYMES    Troponin T, Serum: <0.01 ng/mL (08-09-23 @ 23:45)    < from: 12 Lead ECG (08.09.23 @ 22:53) >  Diagnosis Line Normal sinus rhythm  Normal ECG    < end of copied text >      RADIOLOGY  < from: MR Head w/wo IV Cont (08.10.23 @ 14:25) >  IMPRESSION:    Acute infarcts in the left temporal lobe, left parietal lobe, and left   insular cortex.    Patchy subacute/acute infarct in the right frontal subcortical region   superimposed on small chronic lacunar infarcts.    Mild chronic microvascular ischemic changes and scattered chronic   infarcts.    < end of copied text >    MEDICATIONS  (STANDING):  amLODIPine   Tablet 5 milliGRAM(s) Oral daily  aspirin enteric coated 81 milliGRAM(s) Oral daily  atorvastatin 80 milliGRAM(s) Oral at bedtime  cefTRIAXone   IVPB 1000 milliGRAM(s) IV Intermittent every 24 hours  clopidogrel Tablet 75 milliGRAM(s) Oral daily  heparin   Injectable 5000 Unit(s) SubCutaneous every 12 hours  pantoprazole    Tablet 40 milliGRAM(s) Oral before breakfast  sodium chloride 0.9%. 1000 milliLiter(s) (50 mL/Hr) IV Continuous <Continuous>    MEDICATIONS  (PRN):  acetaminophen     Tablet .. 650 milliGRAM(s) Oral every 6 hours PRN Temp greater or equal to 38C (100.4F), Mild Pain (1 - 3)  labetalol Injectable 10 milliGRAM(s) IV Push once PRN Systolic blood pressure >180mmHg  ondansetron Injectable 4 milliGRAM(s) IV Push every 6 hours PRN Nausea  senna 2 Tablet(s) Oral at bedtime PRN Constipation

## 2023-08-11 NOTE — OCCUPATIONAL THERAPY INITIAL EVALUATION ADULT - VISUAL ASSESSMENT: VISUAL NEGLECT
Pt appears to favor left side; unable to formally assess at this time 2/2 delay in following commands

## 2023-08-11 NOTE — CONSULT NOTE ADULT - NS ATTEND AMEND GEN_ALL_CORE FT
77 y/o f w/ no significant pmh who presents for confusion admitted for acute CVA.  MRA 08/10: Acute infarcts in the left temporal lobe, left parietal lobe, and left insular cortex. She denies cardiac history. But does not se MD. Patient offered GERARDO. She does not want at this time. She agrees to MCOt 30 days. This will be sent to her house. She was instructed by NP how to use.

## 2023-08-12 DIAGNOSIS — S82.61XA DISPLACED FRACTURE OF LATERAL MALLEOLUS OF RIGHT FIBULA, INITIAL ENCOUNTER FOR CLOSED FRACTURE: ICD-10-CM

## 2023-08-12 LAB
ALBUMIN SERPL ELPH-MCNC: 3.8 G/DL — SIGNIFICANT CHANGE UP (ref 3.5–5.2)
ALP SERPL-CCNC: 101 U/L — SIGNIFICANT CHANGE UP (ref 30–115)
ALT FLD-CCNC: 22 U/L — SIGNIFICANT CHANGE UP (ref 0–41)
ANION GAP SERPL CALC-SCNC: 11 MMOL/L — SIGNIFICANT CHANGE UP (ref 7–14)
AST SERPL-CCNC: 20 U/L — SIGNIFICANT CHANGE UP (ref 0–41)
BASOPHILS # BLD AUTO: 0.05 K/UL — SIGNIFICANT CHANGE UP (ref 0–0.2)
BASOPHILS NFR BLD AUTO: 0.6 % — SIGNIFICANT CHANGE UP (ref 0–1)
BILIRUB SERPL-MCNC: 0.6 MG/DL — SIGNIFICANT CHANGE UP (ref 0.2–1.2)
BUN SERPL-MCNC: 29 MG/DL — HIGH (ref 10–20)
CALCIUM SERPL-MCNC: 9.1 MG/DL — SIGNIFICANT CHANGE UP (ref 8.4–10.5)
CHLORIDE SERPL-SCNC: 109 MMOL/L — SIGNIFICANT CHANGE UP (ref 98–110)
CO2 SERPL-SCNC: 23 MMOL/L — SIGNIFICANT CHANGE UP (ref 17–32)
CREAT SERPL-MCNC: 1.2 MG/DL — SIGNIFICANT CHANGE UP (ref 0.7–1.5)
EGFR: 47 ML/MIN/1.73M2 — LOW
EOSINOPHIL # BLD AUTO: 0.35 K/UL — SIGNIFICANT CHANGE UP (ref 0–0.7)
EOSINOPHIL NFR BLD AUTO: 3.9 % — SIGNIFICANT CHANGE UP (ref 0–8)
GLUCOSE SERPL-MCNC: 100 MG/DL — HIGH (ref 70–99)
HCT VFR BLD CALC: 43.5 % — SIGNIFICANT CHANGE UP (ref 37–47)
HGB BLD-MCNC: 14.1 G/DL — SIGNIFICANT CHANGE UP (ref 12–16)
IMM GRANULOCYTES NFR BLD AUTO: 0.2 % — SIGNIFICANT CHANGE UP (ref 0.1–0.3)
LYMPHOCYTES # BLD AUTO: 1.6 K/UL — SIGNIFICANT CHANGE UP (ref 1.2–3.4)
LYMPHOCYTES # BLD AUTO: 18 % — LOW (ref 20.5–51.1)
MAGNESIUM SERPL-MCNC: 2.2 MG/DL — SIGNIFICANT CHANGE UP (ref 1.8–2.4)
MCHC RBC-ENTMCNC: 29 PG — SIGNIFICANT CHANGE UP (ref 27–31)
MCHC RBC-ENTMCNC: 32.4 G/DL — SIGNIFICANT CHANGE UP (ref 32–37)
MCV RBC AUTO: 89.5 FL — SIGNIFICANT CHANGE UP (ref 81–99)
MONOCYTES # BLD AUTO: 1.03 K/UL — HIGH (ref 0.1–0.6)
MONOCYTES NFR BLD AUTO: 11.6 % — HIGH (ref 1.7–9.3)
NEUTROPHILS # BLD AUTO: 5.85 K/UL — SIGNIFICANT CHANGE UP (ref 1.4–6.5)
NEUTROPHILS NFR BLD AUTO: 65.7 % — SIGNIFICANT CHANGE UP (ref 42.2–75.2)
NRBC # BLD: 0 /100 WBCS — SIGNIFICANT CHANGE UP (ref 0–0)
PLATELET # BLD AUTO: 235 K/UL — SIGNIFICANT CHANGE UP (ref 130–400)
PMV BLD: 10.7 FL — HIGH (ref 7.4–10.4)
POTASSIUM SERPL-MCNC: 4.1 MMOL/L — SIGNIFICANT CHANGE UP (ref 3.5–5)
POTASSIUM SERPL-SCNC: 4.1 MMOL/L — SIGNIFICANT CHANGE UP (ref 3.5–5)
PROT SERPL-MCNC: 6.2 G/DL — SIGNIFICANT CHANGE UP (ref 6–8)
RBC # BLD: 4.86 M/UL — SIGNIFICANT CHANGE UP (ref 4.2–5.4)
RBC # FLD: 13.6 % — SIGNIFICANT CHANGE UP (ref 11.5–14.5)
RHEUMATOID FACT SERPL-ACNC: 18 IU/ML — HIGH (ref 0–13)
SODIUM SERPL-SCNC: 143 MMOL/L — SIGNIFICANT CHANGE UP (ref 135–146)
WBC # BLD: 8.9 K/UL — SIGNIFICANT CHANGE UP (ref 4.8–10.8)
WBC # FLD AUTO: 8.9 K/UL — SIGNIFICANT CHANGE UP (ref 4.8–10.8)

## 2023-08-12 PROCEDURE — 99232 SBSQ HOSP IP/OBS MODERATE 35: CPT

## 2023-08-12 PROCEDURE — 99221 1ST HOSP IP/OBS SF/LOW 40: CPT

## 2023-08-12 RX ADMIN — PANTOPRAZOLE SODIUM 40 MILLIGRAM(S): 20 TABLET, DELAYED RELEASE ORAL at 06:28

## 2023-08-12 RX ADMIN — HEPARIN SODIUM 5000 UNIT(S): 5000 INJECTION INTRAVENOUS; SUBCUTANEOUS at 17:39

## 2023-08-12 RX ADMIN — CLOPIDOGREL BISULFATE 75 MILLIGRAM(S): 75 TABLET, FILM COATED ORAL at 11:45

## 2023-08-12 RX ADMIN — ATORVASTATIN CALCIUM 80 MILLIGRAM(S): 80 TABLET, FILM COATED ORAL at 21:22

## 2023-08-12 RX ADMIN — AMLODIPINE BESYLATE 5 MILLIGRAM(S): 2.5 TABLET ORAL at 05:06

## 2023-08-12 RX ADMIN — HEPARIN SODIUM 5000 UNIT(S): 5000 INJECTION INTRAVENOUS; SUBCUTANEOUS at 05:06

## 2023-08-12 RX ADMIN — Medication 81 MILLIGRAM(S): at 11:45

## 2023-08-12 NOTE — PROGRESS NOTE ADULT - SUBJECTIVE AND OBJECTIVE BOX
Neurology Follow up note    Name  BETITO ALEXANDER    HPI:  Patient is 79 yo female with hx of AMS in the past for UTI who has not seen a physician for over 20 yrs presenting with AMS that started yesterday, and progressivenly worsen.  patient is confused, unable to obtain ROS/HX    as per son, patient has been doing ok, yesterday morning, and around noon time, she started to get confused.  Offers no other complaints      (10 Aug 2023 04:41)      Interval History -    No acute overnight   Plan for GERARDO on Monday       Vital Signs Last 24 Hrs  T(C): 36.6 (12 Aug 2023 07:00), Max: 36.9 (11 Aug 2023 23:00)  T(F): 97.8 (12 Aug 2023 07:00), Max: 98.4 (11 Aug 2023 23:00)  HR: 70 (12 Aug 2023 07:22) (69 - 90)  BP: 156/68 (12 Aug 2023 07:22) (144/67 - 200/76)  BP(mean): 98 (12 Aug 2023 07:22) (96 - 111)  RR: 18 (12 Aug 2023 07:22) (18 - 39)  SpO2: --    Parameters below as of 12 Aug 2023 05:13  Patient On (Oxygen Delivery Method): room air      ICU Vital Signs Last 24 Hrs  T(C): 36.6 (12 Aug 2023 07:00), Max: 36.9 (11 Aug 2023 23:00)  T(F): 97.8 (12 Aug 2023 07:00), Max: 98.4 (11 Aug 2023 23:00)  HR: 70 (12 Aug 2023 07:22) (69 - 90)  BP: 156/68 (12 Aug 2023 07:22) (144/67 - 200/76)  BP(mean): 98 (12 Aug 2023 07:22) (96 - 111)  ABP: --  ABP(mean): --  RR: 18 (12 Aug 2023 07:22) (18 - 39)  SpO2: --    O2 Parameters below as of 12 Aug 2023 05:13  Patient On (Oxygen Delivery Method): room air                Neurological Exam:   Orientation: awake, alert, Expressive aphasia   Language: no dysarthria, + aphasia  Cranial Nerves:  visual fields full to confrontation, pupils equal round and reactive to light, extraocular motion intact, face symmetrical  Motor: muscle tone was normal in all four extremities, muscle strength was normal in all four extremities and normal bulk in all four extremities.   Sensory exam: light touch was intact.   Coordination:. No dysmetria       NIHSS: 3, 2 for aphasia and 1 for questionable right HH     Allergies    No Known Allergies    Medications  acetaminophen     Tablet .. 650 milliGRAM(s) Oral every 6 hours PRN  amLODIPine   Tablet 5 milliGRAM(s) Oral daily  aspirin enteric coated 81 milliGRAM(s) Oral daily  atorvastatin 80 milliGRAM(s) Oral at bedtime  cefTRIAXone   IVPB 1000 milliGRAM(s) IV Intermittent every 24 hours  clopidogrel Tablet 75 milliGRAM(s) Oral daily  heparin   Injectable 5000 Unit(s) SubCutaneous every 12 hours  labetalol Injectable 10 milliGRAM(s) IV Push every 8 hours PRN  ondansetron Injectable 4 milliGRAM(s) IV Push every 6 hours PRN  pantoprazole    Tablet 40 milliGRAM(s) Oral before breakfast  senna 2 Tablet(s) Oral at bedtime PRN  sodium chloride 0.9%. 1000 milliLiter(s) IV Continuous <Continuous>      Lab                        14.1   8.90  )-----------( 235      ( 12 Aug 2023 05:53 )             43.5     08-12    143  |  109  |  29<H>  ----------------------------<  100<H>  4.1   |  23  |  1.2    Ca    9.1      12 Aug 2023 05:53  Mg     2.2     08-12    TPro  6.2  /  Alb  3.8  /  TBili  0.6  /  DBili  x   /  AST  20  /  ALT  22  /  AlkPhos  101  08-12    CAPILLARY BLOOD GLUCOSE        LIVER FUNCTIONS - ( 12 Aug 2023 05:53 )  Alb: 3.8 g/dL / Pro: 6.2 g/dL / ALK PHOS: 101 U/L / ALT: 22 U/L / AST: 20 U/L / GGT: x

## 2023-08-12 NOTE — EEG REPORT - NS EEG TEXT BOX
Acquisition Details:  Electroencephalography was acquired using a minimum of 21 channels on an Dynamic Signal Neurology system v 9.3.1 with electrode placement according to the standard International 10-20 system following ACNS (American Clinical Neurophysiology Society) guidelines.  Anterior temporal T1 and T2 electrodes were utilized whenever possible    Findings:  Background:  continuous, with predominantly alpha and beta frequencies.  Voltage:  Normal (20+ uV)  Generalized Slowing:  None  Organization:  Appropriate anterior-posterior gradient  Posterior Dominant Rhythm:  10 Hz symmetric, well-organized, and well-modulated  Focal abnormalities:  Occasional (1-9%) left temporal polymorphic theta.   Sleep:  Absent.  Spontaneous Activity:  No epileptiform discharges    Events:  Frequent electrical/technical artifact.   Provocations:  1)	Hyperventilation: was not performed.  2)	Photic stimulation: was not performed.  Impression:  Technically suboptimal study due to artifact, but likely abnormal routine EEG, awake and drowsy    1)	There was focal slowing over the left temporal region       Final Clinical Correlation:  1)	There were indicators of Left temporal cerebral dysfunction      Mounika Kang MD  Attending Neurologist, [facility]

## 2023-08-12 NOTE — PROGRESS NOTE ADULT - ASSESSMENT
multiple intracranial vascular territory infarcts, likely 2/2 thromboembolic event in setting of intracranial vasculopathy      - Could downgrade for q8 h neuro checks with TELE   - GERARDO on Monday   - needs MCOT/loop recorder on D/C  - continue DAPT  - continue high dose statin  - maintain systolic -180  - PT/OT and speech therapy

## 2023-08-12 NOTE — CONSULT NOTE ADULT - ASSESSMENT
78 year old female with no medical history (does not see doctor) comes to emergency room for confusion. As per son patient was noted  during a phone conversation to be confused, and when he got home the confusion was worse. Unsure of last time well.    acute on chronic CVA / possible UTI     - aspirin, Plavix and Lipitor  - needs MCOT/loop recorder on D/C   - PT/OT/SLP   - check TTE, needs GERARDO   - continue Rocephin for now and check urine culture results   - DVT prophylaxis

## 2023-08-12 NOTE — CHART NOTE - NSCHARTNOTEFT_GEN_A_CORE
Transfer from: ICU  Transfer to: Telemetry    Patient is a 79yo female with no significant PMH (does not see a doctor) who presented to the emergency room on 8/9/2023 for confusion. Upon arrival to the ED, patient was found with significant aphasia. Code Stroke was called with NIHSS . CT head revealed multiple intracranial vascular territorial infarcts, likely secondary to a thromboembolic event in the setting of intracranial vasculopathy. No acute intervention was performed. Patient was monitored in the ICU with general improvement of the aphasia. Given the likely thromboembolic etiology, the patient is scheduled for GERARDO on 8/14/2023 at [facility]. Per neurology, stroke exams can now be Q8H. Patient is medically stable and ready for downgrade to telemetry for further cardiac monitoring.    To Follow:  - GERARDO on Monday, 8/14/2023  - NPO after midnight on Sunday  - Needs MCOT/loop recorder on discharge  - Keep systolic -180

## 2023-08-12 NOTE — CONSULT NOTE ADULT - SUBJECTIVE AND OBJECTIVE BOX
78 year old female with no medical history (does not see doctor) comes to emergency room for confusion. As per son patient was noted  during a phone conversation to be confused, and when he got home the confusion was worse. Unsure of last time well.    Today:  Seen at bedside, no complaints.        REVIEW OF SYSTEMS:  No complaints    MEDICATIONS  (STANDING):  amLODIPine   Tablet 5 milliGRAM(s) Oral daily  aspirin enteric coated 81 milliGRAM(s) Oral daily  atorvastatin 80 milliGRAM(s) Oral at bedtime  cefTRIAXone   IVPB 1000 milliGRAM(s) IV Intermittent every 24 hours  clopidogrel Tablet 75 milliGRAM(s) Oral daily  heparin   Injectable 5000 Unit(s) SubCutaneous every 12 hours  pantoprazole    Tablet 40 milliGRAM(s) Oral before breakfast  sodium chloride 0.9%. 1000 milliLiter(s) (50 mL/Hr) IV Continuous <Continuous>    MEDICATIONS  (PRN):  acetaminophen     Tablet .. 650 milliGRAM(s) Oral every 6 hours PRN Temp greater or equal to 38C (100.4F), Mild Pain (1 - 3)  labetalol Injectable 10 milliGRAM(s) IV Push every 8 hours PRN Systolic blood pressure >  ondansetron Injectable 4 milliGRAM(s) IV Push every 6 hours PRN Nausea  senna 2 Tablet(s) Oral at bedtime PRN Constipation      Allergies  No Known Allergies          Vital Signs Last 24 Hrs  T(C): 36.6 (12 Aug 2023 07:00), Max: 36.9 (11 Aug 2023 23:00)  T(F): 97.8 (12 Aug 2023 07:00), Max: 98.4 (11 Aug 2023 23:00)  HR: 70 (12 Aug 2023 07:22) (69 - 90)  BP: 156/68 (12 Aug 2023 07:22) (144/67 - 200/76)  BP(mean): 98 (12 Aug 2023 07:22) (96 - 111)  RR: 18 (12 Aug 2023 07:22) (18 - 39)    Parameters below as of 12 Aug 2023 05:13  Patient On (Oxygen Delivery Method): room air        PHYSICAL EXAM:  GENERAL: NAD, well-groomed, well-developed  HEAD:  Atraumatic, Normocephalic  EYES: EOMI, PERRLA, conjunctiva and sclera clear  ENMT: No tonsillar erythema, exudates, or enlargement; Moist mucous membranes, Good dentition, No lesions  NECK: Supple, No JVD, Normal thyroid  NERVOUS SYSTEM:  Alert & Oriented X3, Good concentration; Motor Strength 5/5 B/L upper and lower extremities  CHEST/LUNG: CTA bilaterally; No rales, rhonchi, wheezing, or rubs  HEART: Regular rate and rhythm; No murmurs, rubs, or gallops  ABDOMEN: Soft, Nontender, Nondistended; Bowel sounds present  EXTREMITIES:  2+ Peripheral Pulses, No clubbing, cyanosis, or edema  SKIN: No rashes or lesions      LABS:                        14.1   8.90  )-----------( 235      ( 12 Aug 2023 05:53 )             43.5     08-12    143  |  109  |  29<H>  ----------------------------<  100<H>  4.1   |  23  |  1.2    Ca    9.1      12 Aug 2023 05:53  Mg     2.2     08-12    TPro  6.2  /  Alb  3.8  /  TBili  0.6  /  DBili  x   /  AST  20  /  ALT  22  /  AlkPhos  101  08-12      Urinalysis Basic - ( 12 Aug 2023 05:53 )    Color: x / Appearance: x / SG: x / pH: x  Gluc: 100 mg/dL / Ketone: x  / Bili: x / Urobili: x   Blood: x / Protein: x / Nitrite: x   Leuk Esterase: x / RBC: x / WBC x   Sq Epi: x / Non Sq Epi: x / Bacteria: x

## 2023-08-12 NOTE — INPATIENT CERTIFICATION FOR MEDICARE PATIENTS - CURRENT MEDICAL NEEDS AND CARE PLANS
Possible Home/Possible Skilled Nursing Facilty (SNF)/Possible Acute Rehab Prednisone Counseling:  I discussed with the patient the risks of prolonged use of prednisone including but not limited to weight gain, insomnia, osteoporosis, mood changes, diabetes, susceptibility to infection, glaucoma and high blood pressure.  In cases where prednisone use is prolonged, patients should be monitored with blood pressure checks, serum glucose levels and an eye exam.  Additionally, the patient may need to be placed on GI prophylaxis, PCP prophylaxis, and calcium and vitamin D supplementation and/or a bisphosphonate.  The patient verbalized understanding of the proper use and the possible adverse effects of prednisone.  All of the patient's questions and concerns were addressed.

## 2023-08-12 NOTE — CONSULT NOTE ADULT - PROBLEM SELECTOR RECOMMENDATION 9
Can be treated nonsurgically.     - WBAT in CAM Boot  - PT   - Pain control   - Follow up as outpatient in 2 weeks with me. [phone]

## 2023-08-13 PROCEDURE — 99233 SBSQ HOSP IP/OBS HIGH 50: CPT

## 2023-08-13 RX ADMIN — HEPARIN SODIUM 5000 UNIT(S): 5000 INJECTION INTRAVENOUS; SUBCUTANEOUS at 05:10

## 2023-08-13 RX ADMIN — CEFTRIAXONE 100 MILLIGRAM(S): 500 INJECTION, POWDER, FOR SOLUTION INTRAMUSCULAR; INTRAVENOUS at 00:00

## 2023-08-13 RX ADMIN — ATORVASTATIN CALCIUM 80 MILLIGRAM(S): 80 TABLET, FILM COATED ORAL at 21:33

## 2023-08-13 RX ADMIN — PANTOPRAZOLE SODIUM 40 MILLIGRAM(S): 20 TABLET, DELAYED RELEASE ORAL at 05:10

## 2023-08-13 RX ADMIN — Medication 81 MILLIGRAM(S): at 11:20

## 2023-08-13 RX ADMIN — AMLODIPINE BESYLATE 5 MILLIGRAM(S): 2.5 TABLET ORAL at 05:09

## 2023-08-13 RX ADMIN — CLOPIDOGREL BISULFATE 75 MILLIGRAM(S): 75 TABLET, FILM COATED ORAL at 11:20

## 2023-08-13 RX ADMIN — HEPARIN SODIUM 5000 UNIT(S): 5000 INJECTION INTRAVENOUS; SUBCUTANEOUS at 17:01

## 2023-08-13 NOTE — SWALLOW BEDSIDE ASSESSMENT ADULT - SWALLOW EVAL: DIAGNOSIS
+ toleration observed without overt symptoms of penetration/aspiration for Regular/thins
+ toleration observed without overt symptoms of penetration/aspiration for Regular/thins

## 2023-08-13 NOTE — SWALLOW BEDSIDE ASSESSMENT ADULT - NS SPL SWALLOW CLINIC TRIAL FT
+ toleration observed without overt symptoms of penetration/aspiration. Pt, RN and daughter report that pt ate lunch w/o difficulty.
+ toleration observed without overt symptoms of penetration/aspiration.

## 2023-08-13 NOTE — SWALLOW BEDSIDE ASSESSMENT ADULT - ADDITIONAL RECOMMENDATIONS
SLP to f/u for speech/lang/cog eval/tx.
SLP to f/u for speech/lang/cog tx. Dysphagia f/u tx not warranted

## 2023-08-13 NOTE — PROGRESS NOTE ADULT - SUBJECTIVE AND OBJECTIVE BOX
BETITO ALEXANDER 78y Female  MRN#: [id number]   Hospital Day: 3d    SUBJECTIVE  Patient is a 78y old Female who presents with a chief complaint of AMS (12 Aug 2023 11:09)  Currently admitted to medicine with the primary diagnosis of AMS (altered mental status)      INTERVAL HPI AND OVERNIGHT EVENTS:  Patient was examined and seen at bedside. This morning she is resting comfortably in bed and reports no issues or overnight events.  denies chest pain , sob, n/v/d/c, hematuria or bloody stool.     REVIEW OF SYMPTOMS:  10 point ROS negative except as above.  OBJECTIVE  PAST MEDICAL & SURGICAL HISTORY    ALLERGIES:  No Known Allergies    MEDICATIONS:  STANDING MEDICATIONS  amLODIPine   Tablet 5 milliGRAM(s) Oral daily  aspirin enteric coated 81 milliGRAM(s) Oral daily  atorvastatin 80 milliGRAM(s) Oral at bedtime  clopidogrel Tablet 75 milliGRAM(s) Oral daily  heparin   Injectable 5000 Unit(s) SubCutaneous every 12 hours  pantoprazole    Tablet 40 milliGRAM(s) Oral before breakfast  sodium chloride 0.9%. 1000 milliLiter(s) IV Continuous <Continuous>    PRN MEDICATIONS  acetaminophen     Tablet .. 650 milliGRAM(s) Oral every 6 hours PRN  labetalol Injectable 10 milliGRAM(s) IV Push every 8 hours PRN  ondansetron Injectable 4 milliGRAM(s) IV Push every 6 hours PRN  senna 2 Tablet(s) Oral at bedtime PRN      VITAL SIGNS: Last 24 Hours  T(C): 35.7 (13 Aug 2023 05:00), Max: 36.9 (12 Aug 2023 15:14)  T(F): 96.2 (13 Aug 2023 05:00), Max: 98.5 (12 Aug 2023 15:14)  HR: 75 (13 Aug 2023 05:00) (68 - 77)  BP: 153/67 (13 Aug 2023 05:00) (144/64 - 161/73)  BP(mean): 96 (12 Aug 2023 21:00) (96 - 105)  RR: 18 (13 Aug 2023 05:00) (18 - 21)  SpO2: 95% (13 Aug 2023 05:00) (95% - 98%)  PHYSICAL EXAM:  GENERAL: NAD, well-groomed, well-developed  HEENT - NC/AT, pupils equal and reactive to light,  ; Moist mucous membranes, Good dentition, No lesions  NECK: Supple, No JVD  CHEST/LUNG: Clear to auscultation bilaterally; No rales, rhonchi, wheezing  HEART: Regular rate and rhythm; No murmurs, rubs, or gallops  ABDOMEN: Soft, Nontender, Nondistended; Bowel sounds present  EXTREMITIES:  2+ Peripheral Pulses, No clubbing, cyanosis, or edema  NEURO:  No Focal deficits, sensory and motor intact  SKIN: No rashes or lesions    LABS:                        14.1   8.90  )-----------( 235      ( 12 Aug 2023 05:53 )             43.5     08-12    143  |  109  |  29<H>  ----------------------------<  100<H>  4.1   |  23  |  1.2    Ca    9.1      12 Aug 2023 05:53  Mg     2.2     08-12    TPro  6.2  /  Alb  3.8  /  TBili  0.6  /  DBili  x   /  AST  20  /  ALT  22  /  AlkPhos  101  08-12      Urinalysis Basic - ( 12 Aug 2023 05:53 )    Color: x / Appearance: x / SG: x / pH: x  Gluc: 100 mg/dL / Ketone: x  / Bili: x / Urobili: x   Blood: x / Protein: x / Nitrite: x   Leuk Esterase: x / RBC: x / WBC x   Sq Epi: x / Non Sq Epi: x / Bacteria: x    RADIOLOGY:  < from: Xray Chest 1 View-PORTABLE IMMEDIATE (08.10.23 @ 00:35) >  Impression:    No radiographic evidence of acute cardiopulmonary disease.    < end of copied text >  < from: CT Angio Head w/ IV Cont (08.10.23 @ 00:50) >    IMPRESSION:  1.  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  2.  Irregular stenosis involving multiple M2 branches of the right MCA   and distal right TUCKER.  3.  Multifocal stenosis in the bilateral posterior cerebral arteries.    Preliminary findings were discussed with Dr. Acosta at 8/10/2023 2:54 AM.    Communication: The summary of above findings were discussed with readback   confirmation with Dr. Neff by radiologist Dr. Mackey on 8/10/2023 at   9:03 AM.    < end of copied text >  < from: CT Head No Cont (08.10.23 @ 00:52) >  IMPRESSION:  1.  No definite acute intracranial pathology.  2.  Moderate sized chronic left occipital infarct.  3.  Multiple age-indeterminate lacunar infarcts along the right coronal   radiata.  4.  Indeterminate confluent hypodensity in the right frontal subcortical   white matter without mass effect. Nonspecific and differential can   include focus of gliosis, demyelination, chronic microvascular changes   amongst others. If symptoms persist brain MRI can be considered for   further evaluation.    < end of copied text >  < from: Xray Ankle 2 Views, Right (08.11.23 @ 12:50) >  Findings/  impression:    Bony demineralization. Mildly displaced distal fibular fracture. Ankle   mortise appears preserved. Calcaneal spurring. Soft tissue swelling.    < end of copied text >    < from: MR Head w/wo IV Cont (08.10.23 @ 14:25) >    IMPRESSION:    Acute infarcts in the left temporal lobe, left parietal lobe, and left   insular cortex.    Patchy subacute/acute infarct in the right frontal subcortical region   superimposed on small chronic lacunar infarcts.    Mild chronic microvascular ischemic changes and scattered chronic   infarcts.    < end of copied text >  < from: TTE Echo Complete w/o Contrast w/ Doppler (08.10.23 @ 14:14) >  Summary:   1. Normal global left ventricular systolic function.   2. LV Ejection Fraction by Martinez's Method with a biplane EF of 65 %.   3. Normal left atrial size.   4. There is no evidence of pericardial effusion.   5. Mild mitralvalve regurgitation.      < end of copied text >     BETITO ALEXANDER 78y Female  MRN#: [id number]   Hospital Day: 3d    SUBJECTIVE  Patient is a 78y old Female who presents with a chief complaint of AMS (12 Aug 2023 11:09)  Currently admitted to medicine with the primary diagnosis of AMS (altered mental status)      INTERVAL HPI AND OVERNIGHT EVENTS:  Patient was examined and seen at bedside. This morning she is resting comfortably in bed and reports no issues or overnight events.  feels much better, c/o speech denies chest pain , sob, n/v/d/c, hematuria or bloody stool.     REVIEW OF SYMPTOMS:  10 point ROS negative except as above.    OBJECTIVE  PAST MEDICAL & SURGICAL HISTORY    ALLERGIES:  No Known Allergies    MEDICATIONS:  STANDING MEDICATIONS  amLODIPine   Tablet 5 milliGRAM(s) Oral daily  aspirin enteric coated 81 milliGRAM(s) Oral daily  atorvastatin 80 milliGRAM(s) Oral at bedtime  clopidogrel Tablet 75 milliGRAM(s) Oral daily  heparin   Injectable 5000 Unit(s) SubCutaneous every 12 hours  pantoprazole    Tablet 40 milliGRAM(s) Oral before breakfast  sodium chloride 0.9%. 1000 milliLiter(s) IV Continuous <Continuous>    PRN MEDICATIONS  acetaminophen     Tablet .. 650 milliGRAM(s) Oral every 6 hours PRN  labetalol Injectable 10 milliGRAM(s) IV Push every 8 hours PRN  ondansetron Injectable 4 milliGRAM(s) IV Push every 6 hours PRN  senna 2 Tablet(s) Oral at bedtime PRN      VITAL SIGNS: Last 24 Hours  T(C): 35.7 (13 Aug 2023 05:00), Max: 36.9 (12 Aug 2023 15:14)  T(F): 96.2 (13 Aug 2023 05:00), Max: 98.5 (12 Aug 2023 15:14)  HR: 75 (13 Aug 2023 05:00) (68 - 77)  BP: 153/67 (13 Aug 2023 05:00) (144/64 - 161/73)  BP(mean): 96 (12 Aug 2023 21:00) (96 - 105)  RR: 18 (13 Aug 2023 05:00) (18 - 21)  SpO2: 95% (13 Aug 2023 05:00) (95% - 98%)    PHYSICAL EXAM:  GENERAL: NAD, well-groomed, well-developed  HEENT: NC/AT, pupils equal and reactive to light,  ; Moist mucous membranes, Good dentition, No lesions  NECK: Supple, No JVD  CHEST/LUNG: Clear to auscultation bilaterally; No rales, rhonchi, wheezing  HEART: Regular rate and rhythm; No murmurs, rubs, or gallops  ABDOMEN: Soft, Nontender, Nondistended; Bowel sounds present  EXTREMITIES:  2+ Peripheral Pulses, No clubbing, cyanosis, or edema  NEURO:  aox3, sensory and motor intact, speech deficit   SKIN: No rashes or lesions    LABS:                        14.1   8.90  )-----------( 235      ( 12 Aug 2023 05:53 )             43.5     08-12    143  |  109  |  29<H>  ----------------------------<  100<H>  4.1   |  23  |  1.2    Ca    9.1      12 Aug 2023 05:53  Mg     2.2     08-12    TPro  6.2  /  Alb  3.8  /  TBili  0.6  /  DBili  x   /  AST  20  /  ALT  22  /  AlkPhos  101  08-12      Urinalysis Basic - ( 12 Aug 2023 05:53 )    Color: x / Appearance: x / SG: x / pH: x  Gluc: 100 mg/dL / Ketone: x  / Bili: x / Urobili: x   Blood: x / Protein: x / Nitrite: x   Leuk Esterase: x / RBC: x / WBC x   Sq Epi: x / Non Sq Epi: x / Bacteria: x    RADIOLOGY:  < from: Xray Chest 1 View-PORTABLE IMMEDIATE (08.10.23 @ 00:35) >  Impression:    No radiographic evidence of acute cardiopulmonary disease.    < end of copied text >  < from: CT Angio Head w/ IV Cont (08.10.23 @ 00:50) >    IMPRESSION:  1.  Focal occlusion in the proximal inferior M2 branch of the left MCA with diminutive enhancement in the distal branches.  2.  Irregular stenosis involving multiple M2 branches of the right MCA and distal right TUCKER.  3.  Multifocal stenosis in the bilateral posterior cerebral arteries.    < end of copied text >  < from: CT Head No Cont (08.10.23 @ 00:52) >  IMPRESSION:  1.  No definite acute intracranial pathology.  2.  Moderate sized chronic left occipital infarct.  3.  Multiple age-indeterminate lacunar infarcts along the right coronal   radiata.  4.  Indeterminate confluent hypodensity in the right frontal subcortical   white matter without mass effect. Nonspecific and differential can   include focus of gliosis, demyelination, chronic microvascular changes   amongst others. If symptoms persist brain MRI can be considered for   further evaluation.    < end of copied text >  < from: Xray Ankle 2 Views, Right (08.11.23 @ 12:50) >  Findings/  impression:    Bony demineralization. Mildly displaced distal fibular fracture. Ankle   mortise appears preserved. Calcaneal spurring. Soft tissue swelling.    < end of copied text >    < from: MR Head w/wo IV Cont (08.10.23 @ 14:25) >    IMPRESSION:    Acute infarcts in the left temporal lobe, left parietal lobe, and left   insular cortex.    Patchy subacute/acute infarct in the right frontal subcortical region   superimposed on small chronic lacunar infarcts.    Mild chronic microvascular ischemic changes and scattered chronic   infarcts.    < end of copied text >  < from: TTE Echo Complete w/o Contrast w/ Doppler (08.10.23 @ 14:14) >  Summary:   1. Normal global left ventricular systolic function.   2. LV Ejection Fraction by Martinez's Method with a biplane EF of 65 %.   3. Normal left atrial size.   4. There is no evidence of pericardial effusion.   5. Mild mitralvalve regurgitation.      < end of copied text >

## 2023-08-13 NOTE — SWALLOW BEDSIDE ASSESSMENT ADULT - SLP PERTINENT HISTORY OF CURRENT PROBLEM
78 year old female with hx of prior UTI who has not seen a physician in 20 years, on no meds, no cognitive impairment at baseline per family, presents to ED with son yesterday with complaint of confusion that began 8/9 around noon. She had a UTI in 2021 where she presented similarly and resolved with treatment. BP uncontrolled on presentation 213/94, no fever, WBC 15, mild SINCERE, alk phos 117, + UA for nitrites.
78 year old female with hx of prior UTI who has not seen a physician in 20 years, on no meds, no cognitive impairment at baseline per family, presents to ED with son yesterday with complaint of confusion that began 8/9 around noon. She had a UTI in 2021 where she presented similarly and resolved with treatment. BP uncontrolled on presentation 213/94, no fever, WBC 15, mild SINCERE, alk phos 117, + UA for nitrites.

## 2023-08-13 NOTE — PROGRESS NOTE ADULT - ASSESSMENT
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 As per son patient was noted  during a phone conversation to be confused, and when he got home the confusion was worse. Unsure of last time well. evalauted by neurology and ICU, taken to ICU for further neuro monitoring.   labs showed leukocytosis with + nitrite on UA),  No radiographic evidence of acute cardiopulmonary disease.  CT Head No Cont found to have   - No definite acute intracranial pathology. Moderate sized chronic left occipital infarct. Multiple age-indeterminate lacunar infarcts along the right coronal  radiata.     - Indeterminate confluent hypodensity in the right frontal subcortical white matter without mass effect. Nonspecific and differential can include focus of gliosis, demyelination,    chronic microvascular changes amongst others.   CT Angio Head w/ IV Cont found to have    - Focal occlusion in the proximal inferior M2 branch of the left MCA with diminutive enhancement in the distal branches.   - Irregular stenosis involving multiple M2 branches of the right MCA and distal right TUCKER.   - Multifocal stenosis in the bilateral posterior cerebral arteries.  MRI brain w/o found to have    - Acute infarcts in the left temporal lobe, left parietal lobe, and left insular cortex.   - Patchy subacute/acute infarct in the right frontal subcortical region superimposed on small chronic lacunar infarcts.   - Mild chronic microvascular ischemic changes and scattered chronic infarcts.  found to have Fracture of right ankle, lateral malleolus orthopedics evaluated patient recommended nonsurgically, WBAT in [facility], PT, Pain control.    started on CTX for UTI, urine culture showing Ecoli, follow up sensitivity   cardiology, neurology, orthopedics, PT/OT/SLP  following.  plan for MCOT / loop recorder placement   plan for GERARDO at [place] monday 8/14, keep NPO sunday night.       Plan:  - plan for GER[facility]O at [place] monday 8/14, keep NPO sunday night  - needs MCOT / loop recorder on D/C  - CTX for UTI, urine culture showing Ecoli, follow up sensitivity   - aspirin, Plavix and Lipitor  - PT/OT/SLP  following   - Continue Iv fluids as needed  - consulted cardiology, Neurology, orthopedics Rec's appr.   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 As per son patient was noted  during a phone conversation to be confused, and when he got home the confusion was worse. Unsure of last time well. evalauted by neurology and ICU, taken to ICU for further neuro monitoring.   labs showed leukocytosis with + nitrite on UA),  No radiographic evidence of acute cardiopulmonary disease.  CT Head No Cont found to have   - No definite acute intracranial pathology. Moderate sized chronic left occipital infarct. Multiple age-indeterminate lacunar infarcts along the right coronal  radiata.     - Indeterminate confluent hypodensity in the right frontal subcortical white matter without mass effect. Nonspecific and differential can include focus of gliosis, demyelination,    chronic microvascular changes amongst others.   CT Angio Head w/ IV Cont found to have    - Focal occlusion in the proximal inferior M2 branch of the left MCA with diminutive enhancement in the distal branches.   - Irregular stenosis involving multiple M2 branches of the right MCA and distal right TUCKER.   - Multifocal stenosis in the bilateral posterior cerebral arteries.  MRI brain w/o found to have    - Acute infarcts in the left temporal lobe, left parietal lobe, and left insular cortex.   - Patchy subacute/acute infarct in the right frontal subcortical region superimposed on small chronic lacunar infarcts.   - Mild chronic microvascular ischemic changes and scattered chronic infarcts.  found to have Fracture of right ankle, lateral malleolus orthopedics evaluated patient recommended nonsurgically, WBAT in [facility], PT, Pain control.    started on CTX for UTI, urine culture showing Ecoli, follow up sensitivity   cardiology, neurology, orthopedics, PT/OT/SLP  following.  plan for MCOT / loop recorder placement   plan for GERARDO at [place] monday 8/14, patient refused test. cardiology aware.       Plan:  - needs MCOT / loop recorder on D/C  - CTX for UTI, urine culture showing Ecoli, follow up sensitivity   - aspirin, Plavix and Lipitor  - PT/OT/SLP  following   - Continue Iv fluids as needed  - consulted cardiology, Neurology, orthopedics Rec's appr.   - Tylenol and pain management   - monitor vitals closely, daily am labs  - precaution (fall/aspiration/seizure)  - telemetry     DVT prophylaxis: SQH   GI prophylaxis: Protonix   diet:  diet   code status: full code  Goals of care/disposition: Home VS [facility]    #Progress Note Handoff  Pending (specify):  GERARDO, MCOT / loop recorder on D/C and urine culture sensitivity   Disposition: pending [facility] vs home placement,

## 2023-08-13 NOTE — SWALLOW BEDSIDE ASSESSMENT ADULT - COMMENTS
MRI: Acute infarcts in the left temporal lobe, left parietal lobe, and left insular cortex.  Patchy subacute/acute infarct in the right frontal subcortical region superimposed on small chronic lacunar infarcts.  Mild chronic microvascular ischemic changes and scattered chronic infarcts.
MRI: Acute infarcts in the left temporal lobe, left parietal lobe, and left insular cortex.  Patchy subacute/acute infarct in the right frontal subcortical region superimposed on small chronic lacunar infarcts.  Mild chronic microvascular ischemic changes and scattered chronic infarcts.

## 2023-08-14 LAB
-  AMIKACIN: SIGNIFICANT CHANGE UP
-  AMOXICILLIN/CLAVULANIC ACID: SIGNIFICANT CHANGE UP
-  AMPICILLIN/SULBACTAM: SIGNIFICANT CHANGE UP
-  AMPICILLIN: SIGNIFICANT CHANGE UP
-  AZTREONAM: SIGNIFICANT CHANGE UP
-  CEFAZOLIN: SIGNIFICANT CHANGE UP
-  CEFEPIME: SIGNIFICANT CHANGE UP
-  CEFOXITIN: SIGNIFICANT CHANGE UP
-  CEFTRIAXONE: SIGNIFICANT CHANGE UP
-  CEFUROXIME: SIGNIFICANT CHANGE UP
-  CIPROFLOXACIN: SIGNIFICANT CHANGE UP
-  ERTAPENEM: SIGNIFICANT CHANGE UP
-  GENTAMICIN: SIGNIFICANT CHANGE UP
-  IMIPENEM: SIGNIFICANT CHANGE UP
-  LEVOFLOXACIN: SIGNIFICANT CHANGE UP
-  MEROPENEM: SIGNIFICANT CHANGE UP
-  NITROFURANTOIN: SIGNIFICANT CHANGE UP
-  PIPERACILLIN/TAZOBACTAM: SIGNIFICANT CHANGE UP
-  TOBRAMYCIN: SIGNIFICANT CHANGE UP
-  TRIMETHOPRIM/SULFAMETHOXAZOLE: SIGNIFICANT CHANGE UP
CULTURE RESULTS: SIGNIFICANT CHANGE UP
DSDNA AB SER-ACNC: <12 IU/ML — SIGNIFICANT CHANGE UP
METHOD TYPE: SIGNIFICANT CHANGE UP
ORGANISM # SPEC MICROSCOPIC CNT: SIGNIFICANT CHANGE UP
ORGANISM # SPEC MICROSCOPIC CNT: SIGNIFICANT CHANGE UP
SPECIMEN SOURCE: SIGNIFICANT CHANGE UP

## 2023-08-14 PROCEDURE — 99232 SBSQ HOSP IP/OBS MODERATE 35: CPT

## 2023-08-14 RX ADMIN — ATORVASTATIN CALCIUM 80 MILLIGRAM(S): 80 TABLET, FILM COATED ORAL at 21:43

## 2023-08-14 RX ADMIN — PANTOPRAZOLE SODIUM 40 MILLIGRAM(S): 20 TABLET, DELAYED RELEASE ORAL at 05:10

## 2023-08-14 RX ADMIN — AMLODIPINE BESYLATE 5 MILLIGRAM(S): 2.5 TABLET ORAL at 05:10

## 2023-08-14 RX ADMIN — HEPARIN SODIUM 5000 UNIT(S): 5000 INJECTION INTRAVENOUS; SUBCUTANEOUS at 17:46

## 2023-08-14 RX ADMIN — CLOPIDOGREL BISULFATE 75 MILLIGRAM(S): 75 TABLET, FILM COATED ORAL at 12:55

## 2023-08-14 RX ADMIN — Medication 81 MILLIGRAM(S): at 12:55

## 2023-08-14 RX ADMIN — HEPARIN SODIUM 5000 UNIT(S): 5000 INJECTION INTRAVENOUS; SUBCUTANEOUS at 05:10

## 2023-08-14 NOTE — PROGRESS NOTE ADULT - SUBJECTIVE AND OBJECTIVE BOX
Progress note    INTERVAL HPI/OVERNIGHT EVENTS:    Patient seen and examined at bedside. No acute distress. She denies any acute focal neurological deficits.      REVIEW OF SYSTEMS:  All other 13 Review of systems were reviewed and are negative    FAMILY HISTORY:    T(C): 36.7 (08-14-23 @ 14:01), Max: 37.5 (08-13-23 @ 20:09)  HR: 74 (08-14-23 @ 14:01) (71 - 79)  BP: 137/63 (08-14-23 @ 14:01) (137/63 - 162/72)  RR: 16 (08-14-23 @ 05:00) (16 - 18)  SpO2: 94% (08-14-23 @ 05:00) (94% - 95%)  Wt(kg): --Vital Signs Last 24 Hrs  T(C): 36.7 (14 Aug 2023 14:01), Max: 37.5 (13 Aug 2023 20:09)  T(F): 98 (14 Aug 2023 14:01), Max: 99.5 (13 Aug 2023 20:09)  HR: 74 (14 Aug 2023 14:01) (71 - 79)  BP: 137/63 (14 Aug 2023 14:01) (137/63 - 162/72)  BP(mean): --  RR: 16 (14 Aug 2023 05:00) (16 - 18)  SpO2: 94% (14 Aug 2023 05:00) (94% - 95%)    Parameters below as of 14 Aug 2023 05:00  Patient On (Oxygen Delivery Method): room air      No Known Allergies      PHYSICAL EXAM:    GENERAL: NAD  HEENT: NC/AT, pupils equal and reactive to light,  ; Moist mucous membranes, Good dentition, No lesions  NECK: Supple, No JVD  CHEST/LUNG: Clear to auscultation bilaterally; No rales, rhonchi, wheezing  HEART: Regular rate and rhythm; No murmurs, rubs, or gallops  ABDOMEN: Soft, Nontender, Nondistended; Bowel sounds present  EXTREMITIES:  2+ Peripheral Pulses, No clubbing, cyanosis, or edema  NEURO:  aox3, sensory and motor intact, speech deficit, no cerebellar deficits  SKIN: No rashes or lesions    Consultant(s) Notes Reviewed:  [x ] YES  [ ] NO  Care Discussed with Consultants/Other Providers [ x] YES  [ ] NO    LABS:      RADIOLOGY & ADDITIONAL TESTS:    Imaging Personally Reviewed:  [ ] YES  [ ] NO  acetaminophen     Tablet .. 650 milliGRAM(s) Oral every 6 hours PRN  amLODIPine   Tablet 5 milliGRAM(s) Oral daily  aspirin enteric coated 81 milliGRAM(s) Oral daily  atorvastatin 80 milliGRAM(s) Oral at bedtime  clopidogrel Tablet 75 milliGRAM(s) Oral daily  heparin   Injectable 5000 Unit(s) SubCutaneous every 12 hours  labetalol Injectable 10 milliGRAM(s) IV Push every 8 hours PRN  ondansetron Injectable 4 milliGRAM(s) IV Push every 6 hours PRN  pantoprazole    Tablet 40 milliGRAM(s) Oral before breakfast  senna 2 Tablet(s) Oral at bedtime PRN  sodium chloride 0.9%. 1000 milliLiter(s) IV Continuous <Continuous>      HEALTH ISSUES - PROBLEM Dx:  Fracture of right ankle, lateral malleolus    A 78 year old female with no known medical history (does not see a health care provider) BIB son to the emergency room for confusion. As per son patient was noted  during a phone conversation to be confused, and when he got home the confusion was worse. Unsure of last time well. evalauted by neurology and ICU, taken to ICU for further neuro monitoring.   labs showed leukocytosis with + nitrite on UA),  No radiographic evidence of acute cardiopulmonary disease.  CT Head No Cont found to have   - No definite acute intracranial pathology. Moderate sized chronic left occipital infarct. Multiple age-indeterminate lacunar infarcts along the right coronal  radiata.     - Indeterminate confluent hypodensity in the right frontal subcortical white matter without mass effect. Nonspecific and differential can include focus of gliosis, demyelination,    chronic microvascular changes amongst others.   CT Angio Head w/ IV Cont found to have    - Focal occlusion in the proximal inferior M2 branch of the left MCA with diminutive enhancement in the distal branches.   - Irregular stenosis involving multiple M2 branches of the right MCA and distal right TUCKER.   - Multifocal stenosis in the bilateral posterior cerebral arteries.  MRI brain w/o found to have    - Acute infarcts in the left temporal lobe, left parietal lobe, and left insular cortex.   - Patchy subacute/acute infarct in the right frontal subcortical region superimposed on small chronic lacunar infarcts.   - Mild chronic microvascular ischemic changes and scattered chronic infarcts.  found to have Fracture of right ankle, lateral malleolus orthopedics evaluated patient recommended nonsurgically, WBAT in [facility], PT, Pain control.    started on CTX for UTI, urine culture showing Ecoli, follow up sensitivity   cardiology, neurology, orthopedics, PT/OT/SLP  following  Refusing GERARDO      Plan:  - needs MCOT on discharge, Does not want any more procedures such as loop recorder  - CTX for UTI, urine culture showing Ecoli, follow up sensitivity   - aspirin, Plavix and Lipitor  - PT/OT/SLP  following   - s/p IVF  - consulted cardiology, Neurology, orthopedics Rec's appr.   - Tylenol and pain management   - precaution (fall/aspiration/seizure)  - telemetry   - UCx pending    DVT prophylaxis: SQH   GI prophylaxis: Protonix   diet: HH diet   code status: full code  Dispo: will go to 4A when bed available    Total time spent to complete patient's bedside assessment, review medical chart, discuss medical plan of care with covering medical team was ____35____ with > 50% of time spent face to face w/ patient, discussion with patient/family and/or coordination of care Progress note    INTERVAL HPI/OVERNIGHT EVENTS:    Patient seen and examined at bedside. No acute distress. She denies any acute focal neurological deficits.      REVIEW OF SYSTEMS:  All other 13 Review of systems were reviewed and are negative    FAMILY HISTORY:    T(C): 36.7 (08-14-23 @ 14:01), Max: 37.5 (08-13-23 @ 20:09)  HR: 74 (08-14-23 @ 14:01) (71 - 79)  BP: 137/63 (08-14-23 @ 14:01) (137/63 - 162/72)  RR: 16 (08-14-23 @ 05:00) (16 - 18)  SpO2: 94% (08-14-23 @ 05:00) (94% - 95%)  Wt(kg): --Vital Signs Last 24 Hrs  T(C): 36.7 (14 Aug 2023 14:01), Max: 37.5 (13 Aug 2023 20:09)  T(F): 98 (14 Aug 2023 14:01), Max: 99.5 (13 Aug 2023 20:09)  HR: 74 (14 Aug 2023 14:01) (71 - 79)  BP: 137/63 (14 Aug 2023 14:01) (137/63 - 162/72)  BP(mean): --  RR: 16 (14 Aug 2023 05:00) (16 - 18)  SpO2: 94% (14 Aug 2023 05:00) (94% - 95%)    Parameters below as of 14 Aug 2023 05:00  Patient On (Oxygen Delivery Method): room air      No Known Allergies      PHYSICAL EXAM:    GENERAL: NAD  HEENT: NC/AT, pupils equal and reactive to light,  ; Moist mucous membranes, Good dentition, No lesions  NECK: Supple, No JVD  CHEST/LUNG: Clear to auscultation bilaterally; No rales, rhonchi, wheezing  HEART: Regular rate and rhythm; No murmurs, rubs, or gallops  ABDOMEN: Soft, Nontender, Nondistended; Bowel sounds present  EXTREMITIES:  2+ Peripheral Pulses, No clubbing, cyanosis, or edema  NEURO:  aox3, sensory and motor intact, speech deficit, no cerebellar deficits  SKIN: No rashes or lesions    Consultant(s) Notes Reviewed:  [x ] YES  [ ] NO  Care Discussed with Consultants/Other Providers [ x] YES  [ ] NO    LABS:      RADIOLOGY & ADDITIONAL TESTS:    Imaging Personally Reviewed:  [ ] YES  [ ] NO  acetaminophen     Tablet .. 650 milliGRAM(s) Oral every 6 hours PRN  amLODIPine   Tablet 5 milliGRAM(s) Oral daily  aspirin enteric coated 81 milliGRAM(s) Oral daily  atorvastatin 80 milliGRAM(s) Oral at bedtime  clopidogrel Tablet 75 milliGRAM(s) Oral daily  heparin   Injectable 5000 Unit(s) SubCutaneous every 12 hours  labetalol Injectable 10 milliGRAM(s) IV Push every 8 hours PRN  ondansetron Injectable 4 milliGRAM(s) IV Push every 6 hours PRN  pantoprazole    Tablet 40 milliGRAM(s) Oral before breakfast  senna 2 Tablet(s) Oral at bedtime PRN  sodium chloride 0.9%. 1000 milliLiter(s) IV Continuous <Continuous>      HEALTH ISSUES - PROBLEM Dx:  Fracture of right ankle, lateral malleolus    A 78 year old female with no known medical history (does not see a health care provider) BIB son to the emergency room for confusion. As per son patient was noted  during a phone conversation to be confused, and when he got home the confusion was worse. Unsure of last time well. evalauted by neurology and ICU, taken to ICU for further neuro monitoring.   labs showed leukocytosis with + nitrite on UA),  No radiographic evidence of acute cardiopulmonary disease.  CT Head No Cont found to have   - No definite acute intracranial pathology. Moderate sized chronic left occipital infarct. Multiple age-indeterminate lacunar infarcts along the right coronal  radiata.     - Indeterminate confluent hypodensity in the right frontal subcortical white matter without mass effect. Nonspecific and differential can include focus of gliosis, demyelination,    chronic microvascular changes amongst others.   CT Angio Head w/ IV Cont found to have    - Focal occlusion in the proximal inferior M2 branch of the left MCA with diminutive enhancement in the distal branches.   - Irregular stenosis involving multiple M2 branches of the right MCA and distal right TUCKER.   - Multifocal stenosis in the bilateral posterior cerebral arteries.  MRI brain w/o found to have    - Acute infarcts in the left temporal lobe, left parietal lobe, and left insular cortex.   - Patchy subacute/acute infarct in the right frontal subcortical region superimposed on small chronic lacunar infarcts.   - Mild chronic microvascular ischemic changes and scattered chronic infarcts.  found to have Fracture of right ankle, lateral malleolus orthopedics evaluated patient recommended nonsurgically, WBAT in [facility], PT, Pain control.    started on CTX for UTI, urine culture showing Ecoli, follow up sensitivity   cardiology, neurology, orthopedics, PT/OT/SLP  following  Refusing GERARDO  UTI      Plan:  - needs MCOT on discharge, Does not want any more procedures such as loop recorder  - CTX for UTI, urine culture showing Ecoli, follow up sensitivity   - aspirin, Plavix and Lipitor  - PT/OT/SLP  following   - s/p IVF  - consulted cardiology, Neurology, orthopedics Rec's appr.   - Tylenol and pain management   - precaution (fall/aspiration/seizure)  - telemetry   -S/p abx    DVT prophylaxis: SQH   GI prophylaxis: Protonix   diet: HH diet   code status: full code  Dispo: will go to [facility]A when bed available    Total time spent to complete patient's bedside assessment, review medical chart, discuss medical plan of care with covering medical team was ____35____ with > 50% of time spent face to face w/ patient, discussion with patient/family and/or coordination of care

## 2023-08-15 LAB — CARDIOLIPIN AB SER-ACNC: NEGATIVE — SIGNIFICANT CHANGE UP

## 2023-08-15 PROCEDURE — 99222 1ST HOSP IP/OBS MODERATE 55: CPT

## 2023-08-15 PROCEDURE — 99232 SBSQ HOSP IP/OBS MODERATE 35: CPT

## 2023-08-15 RX ADMIN — AMLODIPINE BESYLATE 5 MILLIGRAM(S): 2.5 TABLET ORAL at 05:19

## 2023-08-15 RX ADMIN — ATORVASTATIN CALCIUM 80 MILLIGRAM(S): 80 TABLET, FILM COATED ORAL at 21:38

## 2023-08-15 RX ADMIN — HEPARIN SODIUM 5000 UNIT(S): 5000 INJECTION INTRAVENOUS; SUBCUTANEOUS at 18:10

## 2023-08-15 RX ADMIN — HEPARIN SODIUM 5000 UNIT(S): 5000 INJECTION INTRAVENOUS; SUBCUTANEOUS at 05:19

## 2023-08-15 RX ADMIN — Medication 81 MILLIGRAM(S): at 11:23

## 2023-08-15 RX ADMIN — PANTOPRAZOLE SODIUM 40 MILLIGRAM(S): 20 TABLET, DELAYED RELEASE ORAL at 05:19

## 2023-08-15 RX ADMIN — CLOPIDOGREL BISULFATE 75 MILLIGRAM(S): 75 TABLET, FILM COATED ORAL at 11:23

## 2023-08-15 NOTE — PROGRESS NOTE ADULT - SUBJECTIVE AND OBJECTIVE BOX
Progress note    INTERVAL HPI/OVERNIGHT EVENTS:    Patient seen and examined at bedside. She denies any acute new focal neurological deficits.       REVIEW OF SYSTEMS:  All other 13 Review of systems were reviewed and are negative    FAMILY HISTORY:    T(C): 37.2 (08-15-23 @ 05:02), Max: 37.4 (08-14-23 @ 21:07)  HR: 79 (08-15-23 @ 05:02) (74 - 79)  BP: 159/72 (08-15-23 @ 05:02) (137/63 - 174/79)  RR: 18 (08-15-23 @ 05:02) (18 - 18)  SpO2: 92% (08-15-23 @ 05:02) (92% - 96%)  Wt(kg): --Vital Signs Last 24 Hrs  T(C): 37.2 (15 Aug 2023 05:02), Max: 37.4 (14 Aug 2023 21:07)  T(F): 98.9 (15 Aug 2023 05:02), Max: 99.4 (14 Aug 2023 21:07)  HR: 79 (15 Aug 2023 05:02) (74 - 79)  BP: 159/72 (15 Aug 2023 05:02) (137/63 - 174/79)  BP(mean): --  RR: 18 (15 Aug 2023 05:02) (18 - 18)  SpO2: 92% (15 Aug 2023 05:02) (92% - 96%)    Parameters below as of 15 Aug 2023 05:02  Patient On (Oxygen Delivery Method): room air      No Known Allergies      PHYSICAL EXAM:    GENERAL: NAD  HEENT: NC/AT, pupils equal and reactive to light,  ; Moist mucous membranes, Good dentition, No lesions  NECK: Supple, No JVD  CHEST/LUNG: Clear to auscultation bilaterally; No rales, rhonchi, wheezing  HEART: Regular rate and rhythm; No murmurs, rubs, or gallops  ABDOMEN: Soft, Nontender, Nondistended; Bowel sounds present  EXTREMITIES:  2+ Peripheral Pulses, No clubbing, cyanosis, or edema  NEURO:  aox3, sensory and motor intact, speech deficit, no cerebellar deficits  SKIN: No rashes or lesions    Consultant(s) Notes Reviewed:  [x ] YES  [ ] NO  Care Discussed with Consultants/Other Providers [ x] YES  [ ] NO    LABS:      RADIOLOGY & ADDITIONAL TESTS:    Imaging Personally Reviewed:  [ ] YES  [ ] NO  acetaminophen     Tablet .. 650 milliGRAM(s) Oral every 6 hours PRN  amLODIPine   Tablet 5 milliGRAM(s) Oral daily  aspirin enteric coated 81 milliGRAM(s) Oral daily  atorvastatin 80 milliGRAM(s) Oral at bedtime  clopidogrel Tablet 75 milliGRAM(s) Oral daily  heparin   Injectable 5000 Unit(s) SubCutaneous every 12 hours  labetalol Injectable 10 milliGRAM(s) IV Push every 8 hours PRN  ondansetron Injectable 4 milliGRAM(s) IV Push every 6 hours PRN  pantoprazole    Tablet 40 milliGRAM(s) Oral before breakfast  senna 2 Tablet(s) Oral at bedtime PRN  sodium chloride 0.9%. 1000 milliLiter(s) IV Continuous <Continuous>      HEALTH ISSUES - PROBLEM Dx:  Fracture of right ankle, lateral malleolus    A 78 year old female with no known medical history (does not see a health care provider) BIB son to the emergency room for confusion. As per son patient was noted  during a phone conversation to be confused, and when he got home the confusion was worse. Unsure of last time well. evalauted by neurology and ICU, taken to ICU for further neuro monitoring.   labs showed leukocytosis with + nitrite on UA),  No radiographic evidence of acute cardiopulmonary disease.  CT Head No Cont found to have   - No definite acute intracranial pathology. Moderate sized chronic left occipital infarct. Multiple age-indeterminate lacunar infarcts along the right coronal  radiata.     - Indeterminate confluent hypodensity in the right frontal subcortical white matter without mass effect. Nonspecific and differential can include focus of gliosis, demyelination,    chronic microvascular changes amongst others.   CT Angio Head w/ IV Cont found to have    - Focal occlusion in the proximal inferior M2 branch of the left MCA with diminutive enhancement in the distal branches.   - Irregular stenosis involving multiple M2 branches of the right MCA and distal right TUCKER.   - Multifocal stenosis in the bilateral posterior cerebral arteries.  MRI brain w/o found to have    - Acute infarcts in the left temporal lobe, left parietal lobe, and left insular cortex.   - Patchy subacute/acute infarct in the right frontal subcortical region superimposed on small chronic lacunar infarcts.   - Mild chronic microvascular ischemic changes and scattered chronic infarcts.  found to have Fracture of right ankle, lateral malleolus orthopedics evaluated patient recommended nonsurgically, WBAT in [facility] Boot, PT, Pain control.    started on CTX for UTI, urine culture showing Ecoli, follow up sensitivity   cardiology, neurology, orthopedics, PT/OT/SLP  following  Refusing GERARDO  UTI      Plan:  - needs MCOT on discharge, Does not want any more procedures such as loop recorder  - CTX for UTI, urine culture showing Ecoli, follow up sensitivity   - aspirin, Plavix and Lipitor  - PT/OT/SLP  following   - s/p IVF  - consulted cardiology, Neurology, orthopedics Rec's appr.   - Tylenol and pain management   - precaution (fall/aspiration/seizure)  - telemetry   -S/p abx    DVT prophylaxis: SQH   GI prophylaxis: Protonix   diet: HH diet   code status: full code  Dispo:  Plan for [facility] on thursday. Will need MCOT set up by cardiology on discharge    Handoff: Here for CVA, refusing GERARDO and Loop recorder. Awaiting 4A, likely Thursday. Please set up MCOT by cardiology on day of discharge      Total time spent to complete patient's bedside assessment, review medical chart, discuss medical plan of care with covering medical team was ____35____ with > 50% of time spent face to face w/ patient, discussion with patient/family and/or coordination of care

## 2023-08-15 NOTE — PROGRESS NOTE ADULT - SUBJECTIVE AND OBJECTIVE BOX
Neurology Follow up note    Name  BETITO ALEXANDER    HPI:  Patient is 77 yo female with hx of AMS in the past for UTI who has not seen a physician for over 20 yrs presenting with AMS that started yesterday, and progressivenly worsen.  patient is confused, unable to obtain ROS/HX    as per son, patient has been doing ok, yesterday morning, and around noon time, she started to get confused.  Offers no other complaints      (10 Aug 2023 04:41)      Interval History - feels well, no acute events          Vital Signs Last 24 Hrs  T(C): 37.2 (15 Aug 2023 05:02), Max: 37.4 (14 Aug 2023 21:07)  T(F): 98.9 (15 Aug 2023 05:02), Max: 99.4 (14 Aug 2023 21:07)  HR: 79 (15 Aug 2023 05:02) (74 - 79)  BP: 159/72 (15 Aug 2023 05:02) (137/63 - 174/79)  RR: 18 (15 Aug 2023 05:02) (18 - 18)  SpO2: 92% (15 Aug 2023 05:02) (92% - 96%)    Parameters below as of 15 Aug 2023 05:02  Patient On (Oxygen Delivery Method): room air      Neurological Exam:   Mental status: Awake, alert and oriented x3.  Recent and remote memory intact.  Naming, repetition and comprehension intact.  Attention/concentration intact.  No dysarthria, no aphasia.  Fund of knowledge appropriate.    Cranial nerves: pupils equally round and reactive to light, visual fields full, no nystagmus, extraocular muscles intact, V1 through V3 intact bilaterally and symmetric, face symmetric, hearing intact to finger rub, palate elevation symmetric, tongue was midline, sternocleidomastoid/shoulder shrug strength bilaterally 5/5.    Motor:  Normal bulk and tone, strength 5/5 in bilateral upper and lower extremities.   strength 5/5.  Rapid alternating movements intact and symmetric.   Sensation: Intact to light touch, proprioception, and pinprick.  No neglect.   Coordination: No dysmetria on finger-to-nose and heel-to-shin.  No clumsiness.  Reflexes: 2+ in upper and lower extremities, downgoing toes bilaterally  Gait: Narrow and steady. No ataxia.  Romberg negative    Medications  acetaminophen     Tablet .. 650 milliGRAM(s) Oral every 6 hours PRN  amLODIPine   Tablet 5 milliGRAM(s) Oral daily  aspirin enteric coated 81 milliGRAM(s) Oral daily  atorvastatin 80 milliGRAM(s) Oral at bedtime  clopidogrel Tablet 75 milliGRAM(s) Oral daily  heparin   Injectable 5000 Unit(s) SubCutaneous every 12 hours  labetalol Injectable 10 milliGRAM(s) IV Push every 8 hours PRN  ondansetron Injectable 4 milliGRAM(s) IV Push every 6 hours PRN  pantoprazole    Tablet 40 milliGRAM(s) Oral before breakfast  senna 2 Tablet(s) Oral at bedtime PRN  sodium chloride 0.9%. 1000 milliLiter(s) IV Continuous <Continuous>      MR Head w/wo IV Cont (08.10.23 @ 14:25)   IMPRESSION:  Acute infarcts in the left temporal lobe, left parietal lobe, and left   insular cortex.  Patchy subacute/acute infarct in the right frontal subcortical region   superimposed on small chronic lacunar infarcts.  Mild chronic microvascular ischemic changes and scattered chronic   infarcts.       Neurology Follow up note    Name  BETITO ALEXANDER    HPI:  Patient is 77 yo female with hx of AMS in the past for UTI who has not seen a physician for over 20 yrs presenting with AMS that started yesterday, and progressivenly worsen.  patient is confused, unable to obtain ROS/HX    as per son, patient has been doing ok, yesterday morning, and around noon time, she started to get confused.  Offers no other complaints      (10 Aug 2023 04:41)      Interval History - feels well, willing to go to , refusing GERARDO and ILR. still has some aphasia, she is practicing flash cards          Vital Signs Last 24 Hrs  T(C): 37.2 (15 Aug 2023 05:02), Max: 37.4 (14 Aug 2023 21:07)  T(F): 98.9 (15 Aug 2023 05:02), Max: 99.4 (14 Aug 2023 21:07)  HR: 79 (15 Aug 2023 05:02) (74 - 79)  BP: 159/72 (15 Aug 2023 05:02) (137/63 - 174/79)  RR: 18 (15 Aug 2023 05:02) (18 - 18)  SpO2: 92% (15 Aug 2023 05:02) (92% - 96%)    Parameters below as of 15 Aug 2023 05:02  Patient On (Oxygen Delivery Method): room air      Neurological Exam:   Mental status: Awake, alert and oriented x3.  Recent and remote memory intact.  Naming, repetition and comprehension intact.  Attention/concentration intact.  Fund of knowledge appropriate.  + mild aphasia  Cranial nerves: pupils equally round and reactive to light, no nystagmus, extraocular muscles intact, V1 through V3 intact bilaterally and symmetric, face symmetric, hearing intact to finger rub, palate elevation symmetric, tongue was midline, sternocleidomastoid/shoulder shrug strength bilaterally 5/5.  ?HH  Motor:  Normal bulk and tone, strength 5/5 in bilateral upper and lower extremities.   strength 5/5.    Sensation: Intact to light touch, proprioception, and pinprick.  No neglect.   Coordination: No dysmetria on finger-to-nose and heel-to-shin.  No clumsiness.  Reflexes: 2+ in upper and lower extremities, downgoing toes bilaterally  Gait: Narrow and steady. No ataxia.  Romberg negative    nihss 2 (mild aphasia, ? HH)    Medications  acetaminophen     Tablet .. 650 milliGRAM(s) Oral every 6 hours PRN  amLODIPine   Tablet 5 milliGRAM(s) Oral daily  aspirin enteric coated 81 milliGRAM(s) Oral daily  atorvastatin 80 milliGRAM(s) Oral at bedtime  clopidogrel Tablet 75 milliGRAM(s) Oral daily  heparin   Injectable 5000 Unit(s) SubCutaneous every 12 hours  labetalol Injectable 10 milliGRAM(s) IV Push every 8 hours PRN  ondansetron Injectable 4 milliGRAM(s) IV Push every 6 hours PRN  pantoprazole    Tablet 40 milliGRAM(s) Oral before breakfast  senna 2 Tablet(s) Oral at bedtime PRN  sodium chloride 0.9%. 1000 milliLiter(s) IV Continuous <Continuous>      MR Head w/wo IV Cont (08.10.23 @ 14:25)   IMPRESSION:  Acute infarcts in the left temporal lobe, left parietal lobe, and left   insular cortex.  Patchy subacute/acute infarct in the right frontal subcortical region   superimposed on small chronic lacunar infarcts.  Mild chronic microvascular ischemic changes and scattered chronic   infarcts.

## 2023-08-15 NOTE — CONSULT NOTE ADULT - SUBJECTIVE AND OBJECTIVE BOX
HPI:  77 yo female with hx of AMS in the past for UTI who has not seen a physician for over 20 yrs presenting with AMS that started yesterday, and progressivenly worsen.  patient is confused, unable to obtain ROS/HX    as per son, patient has been doing ok, yesterday morning, and around noon time, she started to get confused.  Offers no other complaints ,  ptn  had  fall  in  er  and  result  of  lt  distal  fib  fx  seen  by  orthopedic  and  clear  with  wbat  with  lt  le  cammboat   ptn  labs, imaging  and  rehab  ,  medical  notes  are  appreciated  and  reviewed    PTN  REFERRED TO ACUTE  REHAB  FOR  EVAL AND  TX   PAST MEDICAL & SURGICAL HISTORY:      Hospital Course:    TODAY'S SUBJECTIVE & REVIEW OF SYMPTOMS:     Constitutional WNL   Cardio WNL   Resp WNL   GI WNL  Heme WNL  Endo WNL  Skin WNL  MSK WNL  Neuro WNL  Cognitive WNL  Psych WNL      MEDICATIONS  (STANDING):  amLODIPine   Tablet 5 milliGRAM(s) Oral daily  aspirin enteric coated 81 milliGRAM(s) Oral daily  atorvastatin 80 milliGRAM(s) Oral at bedtime  clopidogrel Tablet 75 milliGRAM(s) Oral daily  heparin   Injectable 5000 Unit(s) SubCutaneous every 12 hours  pantoprazole    Tablet 40 milliGRAM(s) Oral before breakfast  sodium chloride 0.9%. 1000 milliLiter(s) (50 mL/Hr) IV Continuous <Continuous>    MEDICATIONS  (PRN):  acetaminophen     Tablet .. 650 milliGRAM(s) Oral every 6 hours PRN Temp greater or equal to 38C (100.4F), Mild Pain (1 - 3)  labetalol Injectable 10 milliGRAM(s) IV Push every 8 hours PRN Systolic blood pressure >  ondansetron Injectable 4 milliGRAM(s) IV Push every 6 hours PRN Nausea  senna 2 Tablet(s) Oral at bedtime PRN Constipation      FAMILY HISTORY:      Allergies    No Known Allergies    Intolerances        SOCIAL HISTORY:    [  ] Etoh  [  ] Smoking  [  ] Substance abuse     Home Environment:  [  ] Home Alone  [ x ] Lives with Family  [  ] Home Health Aid    Dwelling:  [  ] Apartment  [ x ] Private House  [  ] Adult Home  [  ] Skilled Nursing Facility      [  ] Short Term  [  ] Long Term  [x  ] Stairs       Elevator [  ]    FUNCTIONAL STATUS PTA: (Check all that apply)  Ambulation: [ x  ]Independent    [  ] Dependent     [  ] Non-Ambulatory  Assistive Device: [  ] SA Cane  [  ]  Q Cane  [  ] Walker  [  ]  Wheelchair  ADL : [  x] Independent  [  ]  Dependent       Vital Signs Last 24 Hrs  T(C): 37.2 (15 Aug 2023 05:02), Max: 37.4 (14 Aug 2023 21:07)  T(F): 98.9 (15 Aug 2023 05:02), Max: 99.4 (14 Aug 2023 21:07)  HR: 79 (15 Aug 2023 05:02) (74 - 79)  BP: 159/72 (15 Aug 2023 05:02) (137/63 - 174/79)  BP(mean): --  RR: 18 (15 Aug 2023 05:02) (18 - 18)  SpO2: 92% (15 Aug 2023 05:02) (92% - 96%)    Parameters below as of 15 Aug 2023 05:02  Patient On (Oxygen Delivery Method): room air          PHYSICAL EXAM: Alert & Oriented X 2  GENERAL: NAD, well-groomed, well-developed  HEAD:  Atraumatic, Normocephalic  EYES: EOMI, PERRLA, conjunctiva and sclera clear  NECK: Supple, No JVD, Normal thyroid  CHEST/LUNG: Clear to percussion bilaterally; No rales, rhonchi, wheezing, or rubs  HEART: Regular rate and rhythm; No murmurs, rubs, or gallops  ABDOMEN: Soft, Nontender, Nondistended; Bowel sounds present  EXTREMITIES:  2+ Peripheral Pulses, No clubbing, cyanosis, or edema    NERVOUS SYSTEM:  Cranial Nerves 2-12 intact [ x ] Abnormal  [  ]  ROM: WFL all extremities [  passive]  Abnormal [  ]  Motor Strength: WFL all extremities  [ 4/5  all  ]  Abnormal [  ]  Sensation: intact to light touch [ x ] Abnormal [  ]  Reflexes: Symmetric [  x]  Abnormal [  ]    FUNCTIONAL STATUS:  Bed Mobility: Independent [  ]  Supervision [  ]  Needs Assistance [x  ]  N/A [  ]  Transfers: Independent [  ]  Supervision [  ]  Needs Assistance [x  ]  N/A [  ]   Ambulation: Independent [  ]  Supervision [  ]  Needs Assistance [x  ]  N/A [  ]  ADL: Independent [  ] Requires Assistance [  ] N/A [ x ]  SEE PT/OT IE NOTES    LABS:                RADIOLOGY & ADDITIONAL STUDIES:< from: CT Head No Cont (08.10.23 @ 00:52) >  1.  No definite acute intracranial pathology.  2.  Moderate sized chronic left occipital infarct.  3.  Multiple age-indeterminate lacunar infarcts along the right coronal   radiata.  4.  Indeterminate confluent hypodensity in the right frontal subcortical   white matter without mass effect. Nonspecific and differential can   include focus of gliosis, demyelination, chronic microvascular changes   amongst others. If symptoms persist brain MRI can be considered for   further evaluation.    < from: Xray Ankle 2 Views, Right (08.11.23 @ 12:50) >  Bony demineralization. Mildly displaced distal fibular fracture. Ankle   mortise appears preserved. Calcaneal spurring. Soft tissue swelling.    < end of copied text >  < end of copied text >      Assesment: HPI:  77 yo female with hx of AMS in the past for UTI who has not seen a physician for over 20 yrs presenting with AMS that started yesterday, and progressivenly worsen.  patient is confused, unable to obtain ROS/HX    as per son, patient has been doing ok, yesterday morning, and around noon time, she started to get confused.  Offers no other complaints ,  ptn  had  fall  in  er  and  result  of  rt  distal  fib  fx  seen  by  orthopedic  and  clear  with  wbat  with  lt  le  cammboat   ptn  labs, imaging  and  rehab  ,  medical  notes  are  appreciated  and  reviewed    PTN  REFERRED TO ACUTE  REHAB  FOR  EVAL AND  TX   PAST MEDICAL & SURGICAL HISTORY:      Hospital Course:    TODAY'S SUBJECTIVE & REVIEW OF SYMPTOMS:     Constitutional WNL   Cardio WNL   Resp WNL   GI WNL  Heme WNL  Endo WNL  Skin WNL  MSK WNL  Neuro WNL  Cognitive WNL  Psych WNL      MEDICATIONS  (STANDING):  amLODIPine   Tablet 5 milliGRAM(s) Oral daily  aspirin enteric coated 81 milliGRAM(s) Oral daily  atorvastatin 80 milliGRAM(s) Oral at bedtime  clopidogrel Tablet 75 milliGRAM(s) Oral daily  heparin   Injectable 5000 Unit(s) SubCutaneous every 12 hours  pantoprazole    Tablet 40 milliGRAM(s) Oral before breakfast  sodium chloride 0.9%. 1000 milliLiter(s) (50 mL/Hr) IV Continuous <Continuous>    MEDICATIONS  (PRN):  acetaminophen     Tablet .. 650 milliGRAM(s) Oral every 6 hours PRN Temp greater or equal to 38C (100.4F), Mild Pain (1 - 3)  labetalol Injectable 10 milliGRAM(s) IV Push every 8 hours PRN Systolic blood pressure >  ondansetron Injectable 4 milliGRAM(s) IV Push every 6 hours PRN Nausea  senna 2 Tablet(s) Oral at bedtime PRN Constipation      FAMILY HISTORY:      Allergies    No Known Allergies    Intolerances        SOCIAL HISTORY:    [  ] Etoh  [  ] Smoking  [  ] Substance abuse     Home Environment:  [  ] Home Alone  [ x ] Lives with Family  [  ] Home Health Aid    Dwelling:  [  ] Apartment  [ x ] Private House  [  ] Adult Home  [  ] Skilled Nursing Facility      [  ] Short Term  [  ] Long Term  [x  ] Stairs       Elevator [  ]    FUNCTIONAL STATUS PTA: (Check all that apply)  Ambulation: [ x  ]Independent    [  ] Dependent     [  ] Non-Ambulatory  Assistive Device: [  ] SA Cane  [  ]  Q Cane  [  ] Walker  [  ]  Wheelchair  ADL : [  x] Independent  [  ]  Dependent       Vital Signs Last 24 Hrs  T(C): 37.2 (15 Aug 2023 05:02), Max: 37.4 (14 Aug 2023 21:07)  T(F): 98.9 (15 Aug 2023 05:02), Max: 99.4 (14 Aug 2023 21:07)  HR: 79 (15 Aug 2023 05:02) (74 - 79)  BP: 159/72 (15 Aug 2023 05:02) (137/63 - 174/79)  BP(mean): --  RR: 18 (15 Aug 2023 05:02) (18 - 18)  SpO2: 92% (15 Aug 2023 05:02) (92% - 96%)    Parameters below as of 15 Aug 2023 05:02  Patient On (Oxygen Delivery Method): room air          PHYSICAL EXAM: Alert & Oriented X 2  GENERAL: NAD, well-groomed, well-developed  HEAD:  Atraumatic, Normocephalic  EYES: EOMI, PERRLA, conjunctiva and sclera clear  NECK: Supple, No JVD, Normal thyroid  CHEST/LUNG: Clear to percussion bilaterally; No rales, rhonchi, wheezing, or rubs  HEART: Regular rate and rhythm; No murmurs, rubs, or gallops  ABDOMEN: Soft, Nontender, Nondistended; Bowel sounds present  EXTREMITIES:  2+ Peripheral Pulses, No clubbing, cyanosis, or edema    NERVOUS SYSTEM:  Cranial Nerves 2-12 intact [ x ] Abnormal  [  ]  ROM: WFL all extremities [  passive]  Abnormal [  ]  Motor Strength: WFL all extremities  [ 4/5  all  ]  Abnormal [  ]  Sensation: intact to light touch [ x ] Abnormal [  ]  Reflexes: Symmetric [  x]  Abnormal [  ]    FUNCTIONAL STATUS:  Bed Mobility: Independent [  ]  Supervision [  ]  Needs Assistance [x  ]  N/A [  ]  Transfers: Independent [  ]  Supervision [  ]  Needs Assistance [x  ]  N/A [  ]   Ambulation: Independent [  ]  Supervision [  ]  Needs Assistance [x  ]  N/A [  ]  ADL: Independent [  ] Requires Assistance [  ] N/A [ x ]  SEE PT/OT IE NOTES    LABS:                RADIOLOGY & ADDITIONAL STUDIES:< from: CT Head No Cont (08.10.23 @ 00:52) >  1.  No definite acute intracranial pathology.  2.  Moderate sized chronic left occipital infarct.  3.  Multiple age-indeterminate lacunar infarcts along the right coronal   radiata.  4.  Indeterminate confluent hypodensity in the right frontal subcortical   white matter without mass effect. Nonspecific and differential can   include focus of gliosis, demyelination, chronic microvascular changes   amongst others. If symptoms persist brain MRI can be considered for   further evaluation.    < from: Xray Ankle 2 Views, Right (08.11.23 @ 12:50) >  Bony demineralization. Mildly displaced distal fibular fracture. Ankle   mortise appears preserved. Calcaneal spurring. Soft tissue swelling.    < end of copied text >  < end of copied text >      Assesment:

## 2023-08-15 NOTE — PROGRESS NOTE ADULT - ASSESSMENT
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 Mild dysarthria and mild aphasia on exam. NIHSS 2.    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 Mild dysarthria and mild aphasia on exam. NIHSS 2.    - awaiting bed on acute rehab 4A  - patient refusing GERARDO and ILR, discussed risks and benefits  - TTE reviewed  - patient needs MCOT on dc to 4A  - continue DAPT  - continue high dose statin  - PT/OT and speech therapy   - f/u stroke clinic 2 weeks after dc    incomplete note

## 2023-08-15 NOTE — CONSULT NOTE ADULT - ASSESSMENT
IMPRESSION: Rehab of 77 y/o f  ptn  rehab  for  r/o  cva  sp  fall  lt  distal ankle  fx  gd     PRECAUTIONS: [  ] Cardiac  [  ] Respiratory  [  ] Seizures [  ] Contact Isolation  [  ] Droplet Isolation  [ FALL ] Other    Weight Bearing Status: WBAT LT LE with cammboat     RECOMMENDATION:    Out of Bed to Chair     DVT/Decubiti Prophylaxis    REHAB PLAN:     [   x] Bedside P/T 3-5 times a week   [x   ]   Bedside O/T  2-3 times a week             [   ] No Rehab Therapy Indicated                   [x  ]  Speech Therapy   Conditioning/ROM                                    ADL  Bed Mobility                                               Conditioning/ROM  Transfers                                                     Bed Mobility  Sitting /Standing Balance                         Transfers                                        Gait Training                                               Sitting/Standing Balance  Stair Training [   ]Applicable                    Home equipment Eval                                                                        Splinting  [   ] Only      GOALS:   ADL   [  x ]   Independent                    Transfers  [x   ] Independent                          Ambulation  [x   ] Independent     [  x  ] With device                            [x   ]  CG                                                         [ x  ]  CG                                                                  [  x ] CG                            [    ] Min A                                                   [   ] Min A                                                              [   ] Min  A          DISCHARGE PLAN:   [ xx  ]  Good candidate for Intensive Rehabilitation/Hospital based-4A [facility]                                             Will tolerate 3hrs Intensive Rehab Daily                                       [    ]  Short Term Rehab in Skilled Nursing Facility                                       [    ]  Home with Outpatient or VN services                                         [    ]  Possible Candidate for Intensive Hospital based Rehab                                        IMPRESSION: Rehab of 77 y/o f  ptn  rehab  for  r/o  cva  sp  fall  lt  distal ankle  fx  gd     PRECAUTIONS: [  ] Cardiac  [  ] Respiratory  [  ] Seizures [  ] Contact Isolation  [  ] Droplet Isolation  [ FALL ] Other    Weight Bearing Status: WBAT LT LE with cammboat     RECOMMENDATION:    Out of Bed to Chair     DVT/Decubiti Prophylaxis    REHAB PLAN:     [   x] Bedside P/T 3-5 times a week   [x   ]   Bedside O/T  2-3 times a week             [   ] No Rehab Therapy Indicated                   [x  ]  Speech Therapy   Conditioning/ROM                                    ADL  Bed Mobility                                               Conditioning/ROM  Transfers                                                     Bed Mobility  Sitting /Standing Balance                         Transfers                                        Gait Training                                               Sitting/Standing Balance  Stair Training [   ]Applicable                    Home equipment Eval                                                                        Splinting  [   ] Only      GOALS:   ADL   [  x ]   Independent                    Transfers  [x   ] Independent                          Ambulation  [x   ] Independent     [  x  ] With device                            [x   ]  CG                                                         [ x  ]  CG                                                                  [  x ] CG                            [    ] Min A                                                   [   ] Min A                                                              [   ] Min  A          DISCHARGE PLAN:   [ xx  ]  Good candidate for Intensive Rehabilitation/Hospital based-4A [facility]  d/w  ptn and  son  cheli  [phone] ptn  care  and  need  4A in  ptn  rehab  he  agree  and  will  dw    his  mother                                               Will tolerate 3hrs Intensive Rehab Daily                                       [    ]  Short Term Rehab in Skilled Nursing Facility                                       [    ]  Home with Outpatient or VN services                                         [    ]  Possible Candidate for Intensive Hospital based Rehab                                        IMPRESSION: Rehab of 79 y/o f  ptn  rehab  for    cva  sp  fall  rt   distal ankle  fx  gd     PRECAUTIONS: [  ] Cardiac  [  ] Respiratory  [  ] Seizures [  ] Contact Isolation  [  ] Droplet Isolation  [ FALL ] Other    Weight Bearing Status: WBAT LT LE with cammboat     RECOMMENDATION:    Out of Bed to Chair     DVT/Decubiti Prophylaxis    REHAB PLAN:     [   x] Bedside P/T 3-5 times a week   [x   ]   Bedside O/T  2-3 times a week             [   ] No Rehab Therapy Indicated                   [x  ]  Speech Therapy   Conditioning/ROM                                    ADL  Bed Mobility                                               Conditioning/ROM  Transfers                                                     Bed Mobility  Sitting /Standing Balance                         Transfers                                        Gait Training                                               Sitting/Standing Balance  Stair Training [   ]Applicable                    Home equipment Eval                                                                        Splinting  [   ] Only      GOALS:   ADL   [  x ]   Independent                    Transfers  [x   ] Independent                          Ambulation  [x   ] Independent     [  x  ] With device                            [x   ]  CG                                                         [ x  ]  CG                                                                  [  x ] CG                            [    ] Min A                                                   [   ] Min A                                                              [   ] Min  A          DISCHARGE PLAN:   [ xx  ]  Good candidate for Intensive Rehabilitation/Hospital based-4A [facility]  d/w  ptn and  son  cheli  [phone] ptn  care  and  need  4A in  ptn  rehab  he  agree  and  will  dw    his  mother                                               Will tolerate 3hrs Intensive Rehab Daily                                       [    ]  Short Term Rehab in Skilled Nursing Facility                                       [    ]  Home with Outpatient or VN services                                         [    ]  Possible Candidate for Intensive Hospital based Rehab

## 2023-08-16 ENCOUNTER — TRANSCRIPTION ENCOUNTER (OUTPATIENT)
Age: 78
End: 2023-08-16

## 2023-08-16 DIAGNOSIS — R41.82 ALTERED MENTAL STATUS, UNSPECIFIED: ICD-10-CM

## 2023-08-16 PROBLEM — Z00.00 ENCOUNTER FOR PREVENTIVE HEALTH EXAMINATION: Status: ACTIVE | Noted: 2023-08-16

## 2023-08-16 LAB
AUTO DIFF PNL BLD: ABNORMAL
C-ANCA SER-ACNC: NEGATIVE — SIGNIFICANT CHANGE UP
P-ANCA SER-ACNC: NEGATIVE — SIGNIFICANT CHANGE UP
SSA-52 (RO52) (ENA) ANTIBODY, IGG: 0 AU/ML — SIGNIFICANT CHANGE UP (ref 0–40)
SSA-60 (RO60) (ENA) ANTIBODY, IGG: 15 AU/ML — SIGNIFICANT CHANGE UP (ref 0–40)

## 2023-08-16 PROCEDURE — 99232 SBSQ HOSP IP/OBS MODERATE 35: CPT

## 2023-08-16 RX ORDER — SENNA PLUS 8.6 MG/1
2 TABLET ORAL
Qty: 0 | Refills: 0 | DISCHARGE
Start: 2023-08-16

## 2023-08-16 RX ORDER — AMLODIPINE BESYLATE 2.5 MG/1
5 TABLET ORAL ONCE
Refills: 0 | Status: COMPLETED | OUTPATIENT
Start: 2023-08-16 | End: 2023-08-16

## 2023-08-16 RX ORDER — PANTOPRAZOLE SODIUM 20 MG/1
1 TABLET, DELAYED RELEASE ORAL
Qty: 0 | Refills: 0 | DISCHARGE
Start: 2023-08-16

## 2023-08-16 RX ORDER — ATORVASTATIN CALCIUM 80 MG/1
1 TABLET, FILM COATED ORAL
Qty: 0 | Refills: 0 | DISCHARGE
Start: 2023-08-16

## 2023-08-16 RX ORDER — AMLODIPINE BESYLATE 2.5 MG/1
1 TABLET ORAL
Qty: 0 | Refills: 0 | DISCHARGE
Start: 2023-08-16

## 2023-08-16 RX ORDER — AMLODIPINE BESYLATE 2.5 MG/1
10 TABLET ORAL DAILY
Refills: 0 | Status: DISCONTINUED | OUTPATIENT
Start: 2023-08-17 | End: 2023-08-17

## 2023-08-16 RX ORDER — ASPIRIN/CALCIUM CARB/MAGNESIUM 324 MG
1 TABLET ORAL
Qty: 0 | Refills: 0 | DISCHARGE
Start: 2023-08-16

## 2023-08-16 RX ORDER — CLOPIDOGREL BISULFATE 75 MG/1
1 TABLET, FILM COATED ORAL
Qty: 0 | Refills: 0 | DISCHARGE
Start: 2023-08-16

## 2023-08-16 RX ORDER — LABETALOL HCL 100 MG
2 TABLET ORAL
Qty: 0 | Refills: 0 | DISCHARGE
Start: 2023-08-16

## 2023-08-16 RX ADMIN — HEPARIN SODIUM 5000 UNIT(S): 5000 INJECTION INTRAVENOUS; SUBCUTANEOUS at 05:48

## 2023-08-16 RX ADMIN — PANTOPRAZOLE SODIUM 40 MILLIGRAM(S): 20 TABLET, DELAYED RELEASE ORAL at 05:48

## 2023-08-16 RX ADMIN — HEPARIN SODIUM 5000 UNIT(S): 5000 INJECTION INTRAVENOUS; SUBCUTANEOUS at 17:06

## 2023-08-16 RX ADMIN — AMLODIPINE BESYLATE 5 MILLIGRAM(S): 2.5 TABLET ORAL at 21:52

## 2023-08-16 RX ADMIN — AMLODIPINE BESYLATE 5 MILLIGRAM(S): 2.5 TABLET ORAL at 05:47

## 2023-08-16 RX ADMIN — ATORVASTATIN CALCIUM 80 MILLIGRAM(S): 80 TABLET, FILM COATED ORAL at 21:46

## 2023-08-16 RX ADMIN — Medication 81 MILLIGRAM(S): at 12:10

## 2023-08-16 RX ADMIN — CLOPIDOGREL BISULFATE 75 MILLIGRAM(S): 75 TABLET, FILM COATED ORAL at 12:10

## 2023-08-16 NOTE — DISCHARGE NOTE PROVIDER - NSDCMRMEDTOKEN_GEN_ALL_CORE_FT
amLODIPine 5 mg oral tablet: 1 tab(s) orally once a day  aspirin 81 mg oral delayed release tablet: 1 tab(s) orally once a day  atorvastatin 80 mg oral tablet: 1 tab(s) orally once a day (at bedtime)  clopidogrel 75 mg oral tablet: 1 tab(s) orally once a day  pantoprazole 40 mg oral delayed release tablet: 1 tab(s) orally once a day (before a meal)  senna leaf extract oral tablet: 2 tab(s) orally once a day (at bedtime) As needed Constipation   aspirin 81 mg oral delayed release tablet: 1 tab(s) orally once a day  atorvastatin 80 mg oral tablet: 1 tab(s) orally once a day (at bedtime)  clopidogrel 75 mg oral tablet: 1 tab(s) orally once a day  Norvasc 10 mg oral tablet: 1 tab(s) orally once a day  pantoprazole 40 mg oral delayed release tablet: 1 tab(s) orally once a day (before a meal)  senna leaf extract oral tablet: 2 tab(s) orally once a day (at bedtime) As needed Constipation

## 2023-08-16 NOTE — PROGRESS NOTE ADULT - SUBJECTIVE AND OBJECTIVE BOX
BETITO [id number]    Subjective/Interval History       ROS    PHYSICAL EXAM  Vital Signs Last 24 Hrs  T(C): 36.1 (16 Aug 2023 13:40), Max: 36.7 (15 Aug 2023 21:46)  T(F): 97 (16 Aug 2023 13:40), Max: 98.1 (15 Aug 2023 21:46)  HR: 74 (16 Aug 2023 13:40) (70 - 78)  BP: 178/79 (16 Aug 2023 13:40) (140/65 - 178/79)  BP(mean): --  RR: 18 (16 Aug 2023 05:00) (18 - 18)  SpO2: 96% (16 Aug 2023 05:00) (94% - 96%)    Parameters below as of 16 Aug 2023 05:00  Patient On (Oxygen Delivery Method): room air      GA : AAOX3, NAD   HEENT: PERRLA, EOMI  NECK: no JVD, no thyromegaly   CVS: S1 S2 no murmur no rubs no gallop  RESP: CTAB no wheeze, no rhonchi no rales  ABD: Soft, NT, ND, tympanic, no rebound or guarding   : No Rodriguez, No CVA tenderness   EXT; no pedal edema, no cyanosis  MSK: No ML spinal tenderness, normal ROM   NEURO: AAOX3, no new focal deficits     LABS/ IMAGING              CAPILLARY BLOOD GLUCOSE                   BETITO [id number]    Subjective/Interval History   -aphasia improving     ROS  - no chest pain .    PHYSICAL EXAM  Vital Signs Last 24 Hrs  T(C): 36.1 (16 Aug 2023 13:40), Max: 36.7 (15 Aug 2023 21:46)  T(F): 97 (16 Aug 2023 13:40), Max: 98.1 (15 Aug 2023 21:46)  HR: 74 (16 Aug 2023 13:40) (70 - 78)  BP: 178/79 (16 Aug 2023 13:40) (140/65 - 178/79)  BP(mean): --  RR: 18 (16 Aug 2023 05:00) (18 - 18)  SpO2: 96% (16 Aug 2023 05:00) (94% - 96%)    Parameters below as of 16 Aug 2023 05:00  Patient On (Oxygen Delivery Method): room air      GA : AAOX3, NAD   HEENT: PERRLA, EOMI  NECK: no JVD, no thyromegaly   CVS: S1 S2 no murmur no rubs no gallop  RESP: CTAB no wheeze, no rhonchi no rales  ABD: Soft, NT, ND, tympanic, no rebound or guarding    EXT; RLE CAM boot no cyanosis  NEURO: AAOX3, expressive aphasia slurred speech, no facial droop, no new focal deficits     LABS/ IMAGING              CAPILLARY BLOOD GLUCOSE

## 2023-08-16 NOTE — DISCHARGE NOTE PROVIDER - CARE PROVIDER_API CALL
Yamilet Silva  Neurology  [address] [place] [id number]  Phone: [phone]  Fax: [phone]  Follow Up Time: 2 weeks   Yamilet Silva  Neurology  [address] [place] [id number]  Phone: [phone]  Fax: [phone]  Follow Up Time: 2 weeks    Enoc Perez  Orthopaedic Surgery  [address] [place] [id number]  Phone: [phone]  Fax: [phone]  Follow Up Time: 2 weeks

## 2023-08-16 NOTE — PROGRESS NOTE ADULT - ASSESSMENT
MEDICATIONS  (STANDING):  amLODIPine   Tablet 5 milliGRAM(s) Oral daily  aspirin enteric coated 81 milliGRAM(s) Oral daily  atorvastatin 80 milliGRAM(s) Oral at bedtime  clopidogrel Tablet 75 milliGRAM(s) Oral daily  heparin   Injectable 5000 Unit(s) SubCutaneous every 12 hours  pantoprazole    Tablet 40 milliGRAM(s) Oral before breakfast  sodium chloride 0.9%. 1000 milliLiter(s) (50 mL/Hr) IV Continuous <Continuous>    MEDICATIONS  (PRN):  acetaminophen     Tablet .. 650 milliGRAM(s) Oral every 6 hours PRN Temp greater or equal to 38C (100.4F), Mild Pain (1 - 3)  labetalol Injectable 10 milliGRAM(s) IV Push every 8 hours PRN Systolic blood pressure >  ondansetron Injectable 4 milliGRAM(s) IV Push every 6 hours PRN Nausea  senna 2 Tablet(s) Oral at bedtime PRN Constipation     78 year old female with no known medical history (does not see a health care provider) BIB son to the emergency room for confusion. As per son patient was noted  during a phone conversation to be confused, and when he got home the confusion was worse. Unsure of last time well. evalauted by neurology and ICU, taken to ICU for further neuro monitoring.   labs showed leukocytosis with + nitrite on UA),  No radiographic evidence of acute cardiopulmonary disease.    Acute LEFT temporal, left parietal, and L insular CVA  - pt declined GERARDO, ECHO no clot or PFO   - cont PT/OT/SLP  - aphasia improving  - MRI brain reviewed, EKG sinus rhythm.  - cont aspirin,plavix, lipitor    - not a candidate for TNK.   - neurology following   - dc to 4A    - CTH -ve  - CT Angio Head w/ IV Cont found to have  Focal occlusion in the proximal inferior M2 branch of the left MCA with diminutive enhancement in the distal branches.  - Irregular stenosis involving multiple M2 branches of the right MCA and distal right TUCKER,  - Multifocal stenosis in the bilateral posterior cerebral arteries.  MRI brain w/o found to have  - Acute infarcts in the left temporal lobe, left parietal lobe, and left insular cortex.   - Patchy subacute/acute infarct in the right frontal subcortical region superimposed on small chronic lacunar infarcts.   - Mild chronic microvascular ischemic changes and scattered chronic infarcts.  - cardiology consulted, MCOT at discharge. decline loop recorder.     Acute right ankle fracture , lateral malleolus  -  orthopedics evaluated patient recommended nonsurgically, WBAT in CAM Boot, PT,   - prn pain meds  - f/u with Dr. Enoc Perez in 2 weeks orthopedics.     E. coli UTI   - s/p abx, sensitivities reviewed.     - PT/OT/SLP  following   - s/p IVF    DVT/GI px  - Heparin Subq/ ppi     Full code        MEDICATIONS  (STANDING):  amLODIPine   Tablet 5 milliGRAM(s) Oral daily  aspirin enteric coated 81 milliGRAM(s) Oral daily  atorvastatin 80 milliGRAM(s) Oral at bedtime  clopidogrel Tablet 75 milliGRAM(s) Oral daily  heparin   Injectable 5000 Unit(s) SubCutaneous every 12 hours  pantoprazole    Tablet 40 milliGRAM(s) Oral before breakfast  sodium chloride 0.9%. 1000 milliLiter(s) (50 mL/Hr) IV Continuous <Continuous>    MEDICATIONS  (PRN):  acetaminophen     Tablet .. 650 milliGRAM(s) Oral every 6 hours PRN Temp greater or equal to 38C (100.4F), Mild Pain (1 - 3)  labetalol Injectable 10 milliGRAM(s) IV Push every 8 hours PRN Systolic blood pressure >  ondansetron Injectable 4 milliGRAM(s) IV Push every 6 hours PRN Nausea  senna 2 Tablet(s) Oral at bedtime PRN Constipation     78 year old female with no known medical history (does not see a health care provider) BIB son to the emergency room for confusion. As per son patient was noted  during a phone conversation to be confused, and when he got home the confusion was worse. Unsure of last time well. evalauted by neurology and ICU, taken to ICU for further neuro monitoring.   labs showed leukocytosis with + nitrite on UA),  No radiographic evidence of acute cardiopulmonary disease.    Assessment and Plan     Acute LEFT temporal, left parietal, and L insular CVA  - pt declined GERARDO, ECHO no clot or PFO   - cont PT/OT/SLP  - aphasia improving  - MRI brain reviewed, EKG sinus rhythm.  - cont aspirin,plavix, lipitor    - not a candidate for TNK.   - neurology following   - dc to 4A    - CTH -ve  - CT Angio Head w/ IV Cont found to have  Focal occlusion in the proximal inferior M2 branch of the left MCA with diminutive enhancement in the distal branches.  - Irregular stenosis involving multiple M2 branches of the right MCA and distal right TUCKER,  - Multifocal stenosis in the bilateral posterior cerebral arteries.  MRI brain w/o found to have  - Acute infarcts in the left temporal lobe, left parietal lobe, and left insular cortex.   - Patchy subacute/acute infarct in the right frontal subcortical region superimposed on small chronic lacunar infarcts.   - Mild chronic microvascular ischemic changes and scattered chronic infarcts.  - cardiology consulted, MCOT at discharge. decline loop recorder.     Acute right ankle fracture , lateral malleolus  -  orthopedics evaluated patient recommended nonsurgically, WBAT in CAM Boot, PT,   - prn pain meds  - f/u with Dr. Enoc Perez in 2 weeks orthopedics.     E. coli UTI   - completed  abx, sensitivities reviewed.     HTN  - stable, elevated   - cont norvasc 5, increase to 10 mg po daily.     DVT/GI px  - Heparin Subq/ ppi     Full code     dc in am to 4A

## 2023-08-16 NOTE — DISCHARGE NOTE PROVIDER - HOSPITAL COURSE
A 78 year old female with no known medical history (does not see a health care provider) BIB son to the emergency room for confusion. As per son patient was noted  during a phone conversation to be confused, and when he got home the confusion was worse. Unsure of last time well. evalauted by neurology and ICU, taken to ICU for further neuro monitoring.   labs showed leukocytosis with + nitrite on UA),    CXR: No radiographic evidence of acute cardiopulmonary disease.    CT Head No Cont found to have   - No definite acute intracranial pathology. Moderate sized chronic left occipital infarct. Multiple age-indeterminate lacunar infarcts along the right coronal  radiata.     - Indeterminate confluent hypodensity in the right frontal subcortical white matter without mass effect. Nonspecific and differential can include focus of gliosis, demyelination,    chronic microvascular changes amongst others.     CT Angio Head w/ IV Cont:    - Focal occlusion in the proximal inferior M2 branch of the left MCA with diminutive enhancement in the distal branches.   - Irregular stenosis involving multiple M2 branches of the right MCA and distal right TUCKER.   - Multifocal stenosis in the bilateral posterior cerebral arteries.    *** MRI brain:   #Acute infarcts in the left temporal lobe, left parietal lobe, and left insular cortex.   - Patchy subacute/acute infarct in the right frontal subcortical region superimposed on small chronic lacunar infarcts.   - Mild chronic microvascular ischemic changes and scattered chronic infarcts.      #  Fracture of right ankle, lateral malleolus orthopedics evaluated patient recommended nonsurgically, WBAT in CAM Boot, PT, Pain control.    # UTI, urine culture showing Ecoli,     Cardiology, Neurology, Orthopedics, PT/OT/SLP  followed patient          Plan:  -  MCOT on discharge, Does not want any more procedures such as loop recorder  - CTX for UTI, urine culture showing Ecoli   - Aspirin, Plavix and Lipitor  - PT/OT/SLP  following   - s/p IVF  - consulted Cardiology, Neurology, Orthopedics Rec's appr.   - Tylenol and pain management   - precaution (fall/aspiration/seizure)  - patient was admitted to Telemetry due to acute Left CVA  -S/p abx for UTI    DVT prophylaxis: SQH   GI prophylaxis: Protonix   diet: HH diet   code status: full code  Dispo:  Plan for 4A: Rehab MCOT set up by cardiology on discharge    Handoff: Here for CVA, refusing GERARDO and Loop recorder. Awaiting 4A, likely Thursday. Please set up MCOT by cardiology on day of discharge       A 78 year old female with no known medical history (does not see a health care provider) BIB son to the emergency room for   confusion. As per son patient was noted  during a phone conversation to be confused, and when he got home the confusion   was worse, with slurred speech and expressive aphasia.  Unsure of last time well. evalauted by neurology and ICU, taken to ICU for further neuro monitoring.   labs showed leukocytosis with + nitrite on UA),    Acute LEFT temporal, left parietal, and L insular CVA  - pt declined GERARDO, ECHO no clot or PFO   - cont PT/OT/SLP  - expressive aphasia/slurred speech improving  - MRI brain reviewed, EKG sinus rhythm.  - started on aspirin,plavix, lipitor , cont DAPT for 90 days then just aspirin.   - not a candidate for TNK, unknown when last well.   - neurology following   - DC to Acute rehab 4A.    - CTH -ve  - CT Angio Head w/ IV Cont found to have  Focal occlusion in the proximal inferior M2 branch of the left MCA with diminutive enhancement in the distal branches.  - Irregular stenosis involving multiple M2 branches of the right MCA and distal right TUCKER,  - Multifocal stenosis in the bilateral posterior cerebral arteries.  MRI brain w/o found to have  - Acute infarcts in the left temporal lobe, left parietal lobe, and left insular cortex.   - Patchy subacute/acute infarct in the right frontal subcortical region superimposed on small chronic lacunar infarcts.   - Mild chronic microvascular ischemic changes and scattered chronic infarcts.  - cardiology consulted, MCOT at discharge. decline loop recorder.   - follow up with Stroke clinic in 2 weeks.     Acute right ankle fracture , lateral malleolus  -  orthopedics evaluated patient recommended nonsurgically, WBAT in CAM Boot, PT,   - prn pain meds  - f/u with Dr. Enoc Perez in 2 weeks orthopedics.     E. coli UTI   - completed  abx, sensitivities reviewed.     HTN, new onset   - stable, elevated   - start on  norvasc 5, increase to 10 mg po daily.     Patient's overall condition improved and the patient is being discharged to 4A Acute rehab.

## 2023-08-16 NOTE — DISCHARGE NOTE PROVIDER - PROVIDER TOKENS
PROVIDER:[TOKEN:[[id number]],FOLLOWUP:[2 weeks]] PROVIDER:[TOKEN:[[id number]],FOLLOWUP:[2 weeks]],PROVIDER:[TOKEN:[[id number]],FOLLOWUP:[2 weeks]]

## 2023-08-16 NOTE — DISCHARGE NOTE PROVIDER - NSDCCPCAREPLAN_GEN_ALL_CORE_FT
PRINCIPAL DISCHARGE DIAGNOSIS  Diagnosis: Acute CVA (cerebrovascular accident)  Assessment and Plan of Treatment: Continue ASA, Plavix and high dose statin. Follow up in Stroke Clinic 2 weeks after discharge, Follow with Dr Silva from Stroke Clinic      SECONDARY DISCHARGE DIAGNOSES  Diagnosis: Fracture of right ankle, lateral malleolus  Assessment and Plan of Treatment: Weight bearing as tolerated, Camm boot, Physical therapy and pain control    Diagnosis: Acute UTI  Assessment and Plan of Treatment: completed antibiotic treatment.     PRINCIPAL DISCHARGE DIAGNOSIS  Diagnosis: Acute CVA (cerebrovascular accident)  Assessment and Plan of Treatment: Acute LEFT temporal, left parietal, and L insular CVA  - pt declined GERARDO  -  ECHO no clot or PFO   - cont PT/OT/SLP  - expressive aphasia/slurred speech improving  - MRI brain reviewed, EKG sinus rhythm.  - started on aspirin,plavix, lipitor , cont DAPT for 90 days then just aspirin.   - not a candidate for TNK, unknown when last well.   - neurology following   - DC to Acute rehab 4A.    - CTH -ve  - CT Angio Head w/ IV Cont found to have  Focal occlusion in the proximal inferior M2 branch of the left MCA with diminutive enhancement in the distal branches.  - Irregular stenosis involving multiple M2 branches of the right MCA and distal right TUCKER,  - Multifocal stenosis in the bilateral posterior cerebral arteries.  MRI brain w/o found to have  - Acute infarcts in the left temporal lobe, left parietal lobe, and left insular cortex.   - Patchy subacute/acute infarct in the right frontal subcortical region superimposed on small chronic lacunar infarcts.   - Mild chronic microvascular ischemic changes and scattered chronic infarcts.  - cardiology consulted, MCOT at discharge. declined loop recorder.    Follow up in Stroke Clinic 2 weeks after discharge, Follow with Dr Silva from Stroke Clinic      SECONDARY DISCHARGE DIAGNOSES  Diagnosis: Fracture of right ankle, lateral malleolus  Assessment and Plan of Treatment: Weight bearing as tolerated, Camm boot, Physical therapy and pain control   - f/u with Dr. Enoc Perez in 2 weeks orthopedics.       Diagnosis: Acute UTI  Assessment and Plan of Treatment: completed antibiotic treatment.    Diagnosis: Benign essential HTN  Assessment and Plan of Treatment: HTN, new onset   - stable, elevated   - start on  norvasc 5, increase to 10 mg po daily.

## 2023-08-16 NOTE — DISCHARGE NOTE PROVIDER - ATTENDING DISCHARGE PHYSICAL EXAMINATION:
VITALS:   T(C): 36.7 (08-17-23 @ 04:47), Max: 36.8 (08-16-23 @ 21:17)  HR: 69 (08-17-23 @ 04:47) (69 - 78)  BP: 152/67 (08-17-23 @ 04:47) (152/67 - 178/79)  RR: 18 (08-17-23 @ 04:47) (18 - 18)  SpO2: 93% (08-17-23 @ 04:47) (93% - 96%)    GENERAL: AAOx3   HEAD:  Atraumatic, Normocephalic  EYES: EOMI, PERRLA, no pallor   ENT:  normal oropharynx , no thyromegaly   NECK: Supple, No JVD  CHEST/LUNG: Clear to auscultation bilaterally; No rales, rhonchi, wheezing, or rubs.  Symmetric expansion   HEART:  S1 S2 RRR ; No murmurs or rubs   ABDOMEN: Soft, nontender, nondistended, BS wnl   EXTREMITIES:  No  cyanosis, or edema  NERVOUS SYSTEM:  A&Ox3, slight slurred speech/expressive aphasia, MS 4+/5 all extremities , sensation intact

## 2023-08-16 NOTE — DISCHARGE NOTE PROVIDER - NSFOLLOWUPCLINICS_GEN_ALL_ED_FT
[facility]  Neurology  [address] [place] [address], [facility] [address]  [place] [address]  Phone: [phone]  Fax:   Follow Up Time: 2 weeks

## 2023-08-17 ENCOUNTER — TRANSCRIPTION ENCOUNTER (OUTPATIENT)
Age: 78
End: 2023-08-17

## 2023-08-17 ENCOUNTER — INPATIENT (INPATIENT)
Facility: HOSPITAL | Age: 78
LOS: 13 days | Discharge: HOME HEALTH | DRG: 57 | End: 2023-08-31
Attending: PHYSICAL MEDICINE & REHABILITATION | Admitting: PHYSICAL MEDICINE & REHABILITATION
Payer: MEDICARE

## 2023-08-17 VITALS
SYSTOLIC BLOOD PRESSURE: 137 MMHG | DIASTOLIC BLOOD PRESSURE: 63 MMHG | RESPIRATION RATE: 16 BRPM | HEART RATE: 71 BPM | OXYGEN SATURATION: 96 % | TEMPERATURE: 96 F

## 2023-08-17 VITALS
SYSTOLIC BLOOD PRESSURE: 138 MMHG | HEIGHT: 61 IN | WEIGHT: 196.43 LBS | TEMPERATURE: 99 F | RESPIRATION RATE: 20 BRPM | HEART RATE: 77 BPM | DIASTOLIC BLOOD PRESSURE: 86 MMHG

## 2023-08-17 DIAGNOSIS — I63.9 CEREBRAL INFARCTION, UNSPECIFIED: ICD-10-CM

## 2023-08-17 LAB
ANION GAP SERPL CALC-SCNC: 11 MMOL/L — SIGNIFICANT CHANGE UP (ref 7–14)
APPEARANCE UR: CLEAR — SIGNIFICANT CHANGE UP
BILIRUB UR-MCNC: NEGATIVE — SIGNIFICANT CHANGE UP
BUN SERPL-MCNC: 36 MG/DL — HIGH (ref 10–20)
CALCIUM SERPL-MCNC: 8.8 MG/DL — SIGNIFICANT CHANGE UP (ref 8.4–10.5)
CHLORIDE SERPL-SCNC: 106 MMOL/L — SIGNIFICANT CHANGE UP (ref 98–110)
CO2 SERPL-SCNC: 21 MMOL/L — SIGNIFICANT CHANGE UP (ref 17–32)
COLOR SPEC: YELLOW — SIGNIFICANT CHANGE UP
CREAT SERPL-MCNC: 1.1 MG/DL — SIGNIFICANT CHANGE UP (ref 0.7–1.5)
DIFF PNL FLD: NEGATIVE — SIGNIFICANT CHANGE UP
EGFR: 52 ML/MIN/1.73M2 — LOW
GLUCOSE SERPL-MCNC: 92 MG/DL — SIGNIFICANT CHANGE UP (ref 70–99)
GLUCOSE UR QL: NEGATIVE MG/DL — SIGNIFICANT CHANGE UP
KETONES UR-MCNC: NEGATIVE MG/DL — SIGNIFICANT CHANGE UP
LEUKOCYTE ESTERASE UR-ACNC: NEGATIVE — SIGNIFICANT CHANGE UP
NITRITE UR-MCNC: NEGATIVE — SIGNIFICANT CHANGE UP
PH UR: 6.5 — SIGNIFICANT CHANGE UP (ref 5–8)
POTASSIUM SERPL-MCNC: 4.2 MMOL/L — SIGNIFICANT CHANGE UP (ref 3.5–5)
POTASSIUM SERPL-SCNC: 4.2 MMOL/L — SIGNIFICANT CHANGE UP (ref 3.5–5)
PROT UR-MCNC: SIGNIFICANT CHANGE UP MG/DL
SODIUM SERPL-SCNC: 138 MMOL/L — SIGNIFICANT CHANGE UP (ref 135–146)
SP GR SPEC: 1.02 — SIGNIFICANT CHANGE UP (ref 1–1.03)
UROBILINOGEN FLD QL: 0.2 MG/DL — SIGNIFICANT CHANGE UP (ref 0.2–1)

## 2023-08-17 PROCEDURE — 97116 GAIT TRAINING THERAPY: CPT | Mod: GP

## 2023-08-17 PROCEDURE — 36415 COLL VENOUS BLD VENIPUNCTURE: CPT

## 2023-08-17 PROCEDURE — 96132 NRPSYC TST EVAL PHYS/QHP 1ST: CPT

## 2023-08-17 PROCEDURE — 90791 PSYCH DIAGNOSTIC EVALUATION: CPT

## 2023-08-17 PROCEDURE — 85025 COMPLETE CBC W/AUTO DIFF WBC: CPT

## 2023-08-17 PROCEDURE — 97530 THERAPEUTIC ACTIVITIES: CPT | Mod: GO

## 2023-08-17 PROCEDURE — 92610 EVALUATE SWALLOWING FUNCTION: CPT | Mod: GN

## 2023-08-17 PROCEDURE — 92507 TX SP LANG VOICE COMM INDIV: CPT | Mod: GN

## 2023-08-17 PROCEDURE — 97166 OT EVAL MOD COMPLEX 45 MIN: CPT | Mod: GO

## 2023-08-17 PROCEDURE — 81003 URINALYSIS AUTO W/O SCOPE: CPT

## 2023-08-17 PROCEDURE — 92523 SPEECH SOUND LANG COMPREHEN: CPT | Mod: GN

## 2023-08-17 PROCEDURE — 83735 ASSAY OF MAGNESIUM: CPT

## 2023-08-17 PROCEDURE — 97110 THERAPEUTIC EXERCISES: CPT | Mod: GO

## 2023-08-17 PROCEDURE — 99239 HOSP IP/OBS DSCHRG MGMT >30: CPT

## 2023-08-17 PROCEDURE — 93010 ELECTROCARDIOGRAM REPORT: CPT

## 2023-08-17 PROCEDURE — 97535 SELF CARE MNGMENT TRAINING: CPT | Mod: GO

## 2023-08-17 PROCEDURE — 97162 PT EVAL MOD COMPLEX 30 MIN: CPT | Mod: GP

## 2023-08-17 PROCEDURE — 80053 COMPREHEN METABOLIC PANEL: CPT

## 2023-08-17 PROCEDURE — 90832 PSYTX W PT 30 MINUTES: CPT

## 2023-08-17 PROCEDURE — 93005 ELECTROCARDIOGRAM TRACING: CPT

## 2023-08-17 RX ORDER — ACETAMINOPHEN 500 MG
650 TABLET ORAL EVERY 6 HOURS
Refills: 0 | Status: DISCONTINUED | OUTPATIENT
Start: 2023-08-17 | End: 2023-08-31

## 2023-08-17 RX ORDER — ATORVASTATIN CALCIUM 80 MG/1
80 TABLET, FILM COATED ORAL AT BEDTIME
Refills: 0 | Status: DISCONTINUED | OUTPATIENT
Start: 2023-08-17 | End: 2023-08-31

## 2023-08-17 RX ORDER — PANTOPRAZOLE SODIUM 20 MG/1
40 TABLET, DELAYED RELEASE ORAL
Refills: 0 | Status: DISCONTINUED | OUTPATIENT
Start: 2023-08-17 | End: 2023-08-31

## 2023-08-17 RX ORDER — ONDANSETRON 8 MG/1
4 TABLET, FILM COATED ORAL EVERY 8 HOURS
Refills: 0 | Status: DISCONTINUED | OUTPATIENT
Start: 2023-08-17 | End: 2023-08-31

## 2023-08-17 RX ORDER — SODIUM CHLORIDE 9 MG/ML
1000 INJECTION, SOLUTION INTRAVENOUS
Refills: 0 | Status: DISCONTINUED | OUTPATIENT
Start: 2023-08-17 | End: 2023-08-18

## 2023-08-17 RX ORDER — ASPIRIN/CALCIUM CARB/MAGNESIUM 324 MG
81 TABLET ORAL DAILY
Refills: 0 | Status: DISCONTINUED | OUTPATIENT
Start: 2023-08-17 | End: 2023-08-31

## 2023-08-17 RX ORDER — AMLODIPINE BESYLATE 2.5 MG/1
10 TABLET ORAL DAILY
Refills: 0 | Status: DISCONTINUED | OUTPATIENT
Start: 2023-08-17 | End: 2023-08-31

## 2023-08-17 RX ORDER — CLOPIDOGREL BISULFATE 75 MG/1
75 TABLET, FILM COATED ORAL DAILY
Refills: 0 | Status: DISCONTINUED | OUTPATIENT
Start: 2023-08-17 | End: 2023-08-31

## 2023-08-17 RX ORDER — POLYETHYLENE GLYCOL 3350 17 G/17G
17 POWDER, FOR SOLUTION ORAL
Refills: 0 | Status: DISCONTINUED | OUTPATIENT
Start: 2023-08-17 | End: 2023-08-29

## 2023-08-17 RX ORDER — HEPARIN SODIUM 5000 [USP'U]/ML
5000 INJECTION INTRAVENOUS; SUBCUTANEOUS EVERY 12 HOURS
Refills: 0 | Status: DISCONTINUED | OUTPATIENT
Start: 2023-08-17 | End: 2023-08-31

## 2023-08-17 RX ORDER — SENNA PLUS 8.6 MG/1
2 TABLET ORAL AT BEDTIME
Refills: 0 | Status: DISCONTINUED | OUTPATIENT
Start: 2023-08-17 | End: 2023-08-17

## 2023-08-17 RX ORDER — AMLODIPINE BESYLATE 2.5 MG/1
1 TABLET ORAL
Qty: 30 | Refills: 0
Start: 2023-08-17

## 2023-08-17 RX ORDER — SENNA PLUS 8.6 MG/1
2 TABLET ORAL AT BEDTIME
Refills: 0 | Status: DISCONTINUED | OUTPATIENT
Start: 2023-08-17 | End: 2023-08-31

## 2023-08-17 RX ADMIN — AMLODIPINE BESYLATE 10 MILLIGRAM(S): 2.5 TABLET ORAL at 05:28

## 2023-08-17 RX ADMIN — PANTOPRAZOLE SODIUM 40 MILLIGRAM(S): 20 TABLET, DELAYED RELEASE ORAL at 05:28

## 2023-08-17 RX ADMIN — ATORVASTATIN CALCIUM 80 MILLIGRAM(S): 80 TABLET, FILM COATED ORAL at 21:43

## 2023-08-17 RX ADMIN — POLYETHYLENE GLYCOL 3350 17 GRAM(S): 17 POWDER, FOR SOLUTION ORAL at 21:45

## 2023-08-17 RX ADMIN — HEPARIN SODIUM 5000 UNIT(S): 5000 INJECTION INTRAVENOUS; SUBCUTANEOUS at 21:44

## 2023-08-17 RX ADMIN — SODIUM CHLORIDE 75 MILLILITER(S): 9 INJECTION, SOLUTION INTRAVENOUS at 21:54

## 2023-08-17 RX ADMIN — Medication 81 MILLIGRAM(S): at 11:34

## 2023-08-17 RX ADMIN — CLOPIDOGREL BISULFATE 75 MILLIGRAM(S): 75 TABLET, FILM COATED ORAL at 11:34

## 2023-08-17 RX ADMIN — SENNA PLUS 2 TABLET(S): 8.6 TABLET ORAL at 21:43

## 2023-08-17 RX ADMIN — HEPARIN SODIUM 5000 UNIT(S): 5000 INJECTION INTRAVENOUS; SUBCUTANEOUS at 05:28

## 2023-08-17 NOTE — H&P ADULT - NSHPPHYSICALEXAM_GEN_ALL_CORE
PHYSICAL EXAMINATION   VItals: T(C): 36.7 (08-17-23 @ 04:47), Max: 36.8 (08-16-23 @ 21:17)  HR: 69 (08-17-23 @ 04:47) (69 - 78)  BP: 152/67 (08-17-23 @ 04:47) (152/67 - 178/79)  RR: 18 (08-17-23 @ 04:47) (18 - 18)  SpO2: 93% (08-17-23 @ 04:47) (93% - 96%)    General:[   ] NAD, Resting Comfortable,   [   ] other:                                HEENT: [   ] NC/AT, EOMI, PERRL , Normal Conjunctivae,   [   ] other:  Cardio: [   ] RRR, no murmer,   [   ] other:                              Pulm: [   ] No Respiratory Distress,  Lungs CTAB,   [   ] other:                       Abdomen: [   ]ND/NT, Soft,   [   ] other:    : [   ] NO ZAVALA CATHETER, [   ] ZAVALA CATHETER- no meatal tear, no discharge, [   ] other:                                            MSK: [   ] No joint swelling, Full ROM,   [   ] other:                                         Ext: [   ]No C/C/E, No calf tenderness,   [   ]other:    Skin: [   ]intact,   [   ] other:                                                                   Neurological Examination:  Cognitive: [    ] AAO x 3,   [    ]  other:                                                                      Attention:  [    ] intact,   [    ]  other:                            Memory: [    ] intact,    [    ]  other:     Mood/Affect: [    ] wnl,    [    ]  other:                                                                             Communication: [    ]Fluent, no dysarthria, following commands:  [    ] other:   CN II - XII:  [    ] intact,  [    ] other:                                                                                        Motor:   RIGHT UE: [   ] WNL,  [   ] other:  LEFT    UE: [   ] WNL,  [   ] other:  RIGHT LE: [   ] WNL,  [   ] other:   LEFT    LE: [   ] WNL,  [   ] other:    Tone: [    ] wnl,   [    ]  other:  DTRs: [   ]symmetric, [   ] other:  Coordination:   [    ] intact,   [    ] other:                                                                           Sensory: [    ] Intact to light touch,   [    ] other: PHYSICAL EXAMINATION   VItals: T(C): 36.7 (08-17-23 @ 04:47), Max: 36.8 (08-16-23 @ 21:17)  HR: 69 (08-17-23 @ 04:47) (69 - 78)  BP: 152/67 (08-17-23 @ 04:47) (152/67 - 178/79)  RR: 18 (08-17-23 @ 04:47) (18 - 18)  SpO2: 93% (08-17-23 @ 04:47) (93% - 96%)    General:[x   ] NAD, Resting Comfortable,   [   ] other:                                HEENT: [ x  ] NC/AT, EOMI, PERRL , Normal Conjunctivae,   [   ] other:  Cardio: [   ] RRR, no murmer,   [   ] other:                              Pulm: [ x  ] No Respiratory Distress,  Lungs CTAB,   [   ] other:                       Abdomen: [   ]ND/NT, Soft,   [   ] other:    : [   ] NO ZAVALA CATHETER, [   ] ZAVALA CATHETER- no meatal tear, no discharge, [   ] other:                                            MSK: [   ] No joint swelling, Full ROM,   [ x  ] other:  right lateral ankle swelling and tenderness                                       Ext: [ x  ]No C/C/E, No calf tenderness,   [   ]other:    Skin: [x   ]intact,   [   ] other:                                                                   Neurological Examination:  Cognitive: [    ] AAO x 3,   [ x   ]  other:  oriented to self and place, year with choices                                                              Attention:  [ x   ] intact,   [    ]  other:                            Memory: [    ] intact,    [  x  ]  other:   limited by aphasia  Mood/Affect: [   x ] wnl,    [    ]  other:                                                                             Communication: [    ]Fluent, no dysarthria, following commands:  [  x  ] other: good comprhension, mild to moderate nonfluent expressive impairment  CN II - XII:  [  x  ] intact,  [    ] other:                                                                                        Motor:   RIGHT UE: [x   ] WNL,  [   ] other:  LEFT    UE: [ x  ] WNL,  [   ] other:  RIGHT LE: [x   ] WNL,  [   ] other:   LEFT    LE: [ x  ] WNL,  [   ] other:    Tone: [x    ] wnl,   [    ]  other:  DTRs: [ x  ]symmetric, [   ] other:  Coordination:   [    ] intact,   [  x  ] other:  mild decrease both legs                                                                         Sensory: [   x ] Intact to light touch,   [    ] other:

## 2023-08-17 NOTE — DISCHARGE NOTE NURSING/CASE MANAGEMENT/SOCIAL WORK - PATIENT PORTAL LINK FT
You can access the FollowMyHealth Patient Portal offered by [facility] by registering at the following website: [email or web address]. By joining [employer]’s FollowMyHealth portal, you will also be able to view your health information using other applications (apps) compatible with our system.

## 2023-08-17 NOTE — H&P ADULT - ATTENDING COMMENTS
I reviewed the chart and examined the patient with the resident and we discussed the findings and treatment plan.  The patient is tolerating the bedside rehab program well. I agree with the findings and treatment plan above, which I modified as indicated. The patient requires 3 hrs a day of acute inpatient rehab. Patient admitted with AMS and found to have UTI. MRI revealed left acute MCA infarct and right subacute/ acute infarcts. She had a fall and was diagnosed with a left distal fibula fracture and cleared for WBAT in CAM Boot.. TTE was essentially WNL and she refused GERARDO and ILR.  She is tolerating bedside therapies well and requires min assist to transfer, min assist for ambulation with a RW 30 ft and min to mod assist for dressing. PTA, she was fully independent in all ADLs and mobility w/out device. The patient requires acute interdisciplinary rehab including PT and OT to maximize function for safe d/c home in a reasonable time. She has pst acute stroke neurologic impairments and orthopedic impairments that are most appropriately treated in an interdisciplinary acute rehab settingThe patient can tolerate and benefit from 3 hrs daily therapy and requires Physiatry f/u at least 3 days a week to monitor her uncontrolled HTN, and monitor her neuro status and response to therapy given her cardioembolic strokes of unclear etiology. She also has a new significant azotemia which needs to be evaluated for retention vs. dehydration and treated/ monitored.     I read, edited and agree with the Assessment:  #Rehab of gait disorder secondary to multiple intracranial vascular territory infarcts, likely 2/2 thromboembolic event in setting of intracranial vasculopathy, and right ankle fracture of lateral malleolus  #CVA; Acute Left temporal, L parietal, and L insular  - Continue DAPT 81mg ASA QD, Plavix 75mg QD, high dose statin 80mg atorvastatin 80mg qhs  - maintain SBP goals 140-180.   - continue PT/OT/SLP   GERARDO planned for 8/14 -- refused by patient and family.   TTE 8/10 revealed: normal LV function with EF 65%, mild MVR. no PFO or thrombus  -MCOT set up prior to d/c, pt does not want procedures done: such as loop recorder    #HTN  SBP goal per neuro 140-180  - c/w Norvasc 10mg QD  - can consider labetalol for spot dose hypertensive episodes prn    #R lateral malleolus ankle fracture  - WBAT in CAM boot  - conservative non-surgical management  - Pain control tylenol 650mg q6hr PRN  - followup with orthopedics, Dr. Enoc Perez in 2 weeks orthopedics.     #E.coli UTI  - Pt started on CTX for abx treatment    #Misc  DVT ppx: SQH  Zofran for nausea  GI/bowel ppx: protonix, senna qhs  Diet: DASH/TLC diet  Code status: Full Code  -Skin: No active issues at this time    Precautions / PROPHYLAXIS:      - Falls, safety    - Ortho: Weight bearing status: WABT with CAM boot of RLE I reviewed the chart and examined the patient with the resident and we discussed the findings and treatment plan.  The patient is tolerating the bedside rehab program well. I agree with the findings and treatment plan above, which I modified as indicated. The patient requires 3 hrs a day of acute inpatient rehab. Patient admitted with AMS and found to have UTI. MRI revealed left acute MCA infarct and right subacute/ acute infarcts. She had a fall and was diagnosed with a left distal fibula fracture and cleared for WBAT in CAM Boot.. TTE was essentially WNL and she refused GERARDO and ILR.  She is tolerating bedside therapies well and requires min assist to transfer, min assist for ambulation with a RW 30 ft and min to mod assist for dressing. PTA, she was fully independent in all ADLs and mobility w/out device. The patient requires acute interdisciplinary rehab including PT and OT and ST to maximize function for safe d/c home in a reasonable time. She has post acute stroke neurologic impairments and orthopedic impairments that are most appropriately treated in an interdisciplinary acute rehab setting.  She also has aphasia, requiring ST. The patient can tolerate and benefit from 3 hrs daily therapy and requires Physiatry f/u at least 3 days a week to monitor her uncontrolled HTN, and monitor her neuro status and response to therapy given her cardioembolic strokes of unclear etiology. She also has a new significant azotemia which needs to be evaluated for retention vs. dehydration and treated/ monitored.     I read, edited and agree with the Assessment:  ASSESSMENT/PLAN  78 year old female with no known medical history (does not see a health care provider for past 20 years) brought in by son to the emergency room for confusion. As per son, patient was noted  during a phone conversation to be confused, and when he got home the confusion was worse. Unsure of last time of known welll. evaluated by neurology and ICU. Pt presented with aphasia. taken to ICU for further neuro monitoring.  labs showed leukocytosis with + nitrite on UA), s/p fall pt with R ankle fracture seen by ortho and managed conservatively with CAM boot.  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       #Rehab of gait disorder secondary to multiple intracranial vascular territory infarcts, likely 2/2 thromboembolic event in setting of intracranial vasculopathy, and right ankle fracture of lateral malleolus  #CVA; Acute Left temporal, L parietal, and L insular  - Continue DAPT 81mg ASA QD, Plavix 75mg QD, high dose statin 80mg atorvastatin 80mg qhs  - maintain SBP goals 140-180.   - continue PT/OT/SLP   GERARDO planned for 8/14 -- refused by patient and family.   TTE 8/10 revealed: normal LV function with EF 65%, mild MVR. no PFO or thrombus  -MCOT set up prior to d/c, pt does not want procedures done: such as loop recorder    #Aphasia  - ST/ Neuropsych eval    #HTN  SBP goal per neuro 140-180  - c/w Norvasc 10mg QD    #R lateral malleolus ankle fracture  - WBAT in CAM boot  - conservative non-surgical management  - Pain control tylenol 650mg q6hr PRN  - followup with orthopedics, Dr. Enoc Perez in 2 weeks orthopedics.     #s/p E.coli UTI  - Pt started on CTX for abx treatment  - new urinary incontinence. Will get bladder scan PVR r/o retention    #Misc  DVT ppx: SQH  Zofran for nausea  GI/bowel ppx: protonix, senna qhs  Diet: DASH/TLC diet  Code status: Full Code  -Skin: No active issues at this time    Precautions / PROPHYLAXIS:      - Falls, safety    - Ortho: Weight bearing status: WABT with CAM boot of RLE I reviewed the chart and examined the patient with the resident and we discussed the findings and treatment plan.  The patient is tolerating the bedside rehab program well. I agree with the findings and treatment plan above, which I modified as indicated. The patient requires 3 hrs a day of acute inpatient rehab. Patient admitted with AMS and found to have UTI. MRI revealed left acute MCA infarct and right subacute/ acute infarcts. She had a fall and was diagnosed with a right distal fibula fracture and cleared for WBAT in CAM Boot.. TTE was essentially WNL and she refused GERARDO and ILR.  She is tolerating bedside therapies well and requires min assist to transfer, min assist for ambulation with a RW 30 ft and min to mod assist for dressing. PTA, she was fully independent in all ADLs and mobility w/out device. The patient requires acute interdisciplinary rehab including PT and OT and ST to maximize function for safe d/c home in a reasonable time. She has post acute stroke neurologic impairments and orthopedic impairments that are most appropriately treated in an interdisciplinary acute rehab setting.  She also has aphasia, requiring ST. The patient can tolerate and benefit from 3 hrs daily therapy and requires Physiatry f/u at least 3 days a week to monitor her uncontrolled HTN, and monitor her neuro status and response to therapy given her cardioembolic strokes of unclear etiology. She also has a new significant azotemia which needs to be evaluated for retention vs. dehydration and treated/ monitored.     I read, edited and agree with the Assessment:  ASSESSMENT/PLAN  78 year old female with no known medical history (does not see a health care provider for past 20 years) brought in by son to the emergency room for confusion. As per son, patient was noted  during a phone conversation to be confused, and when he got home the confusion was worse. Unsure of last time of known welll. evaluated by neurology and ICU. Pt presented with aphasia. taken to ICU for further neuro monitoring.  labs showed leukocytosis with + nitrite on UA), s/p fall pt with R ankle fracture seen by ortho and managed conservatively with CAM boot.  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       #Rehab of gait disorder secondary to multiple intracranial vascular territory infarcts, likely 2/2 thromboembolic event in setting of intracranial vasculopathy, and right ankle fracture of lateral malleolus  #CVA; Acute Left temporal, L parietal, and L insular  - Continue DAPT 81mg ASA QD, Plavix 75mg QD, high dose statin 80mg atorvastatin 80mg qhs  - maintain SBP goals 140-180.   - continue PT/OT/SLP   GERARDO planned for 8/14 -- refused by patient and family.   TTE 8/10 revealed: normal LV function with EF 65%, mild MVR. no PFO or thrombus  -MCOT set up prior to d/c, pt does not want procedures done: such as loop recorder    #Aphasia  - ST/ Neuropsych eval    #HTN  SBP goal per neuro 140-180  - c/w Norvasc 10mg QD    #R lateral malleolus ankle fracture  - WBAT in CAM boot  - conservative non-surgical management  - Pain control tylenol 650mg q6hr PRN  - followup with orthopedics, Dr. Enoc Perez in 2 weeks orthopedics.     #s/p E.coli UTI  - Pt started on CTX for abx treatment  - new urinary incontinence. Will get bladder scan PVR r/o retention    #Misc  DVT ppx: SQH  Zofran for nausea  GI/bowel ppx: protonix, senna qhs  Diet: DASH/TLC diet  Code status: Full Code  -Skin: No active issues at this time    Precautions / PROPHYLAXIS:      - Falls, safety    - Ortho: Weight bearing status: WABT with CAM boot of RLE

## 2023-08-17 NOTE — H&P ADULT - NSHPSOCIALHISTORY_GEN_ALL_CORE
SHx: *** Tobacco use, EtOH use, recreational drug use    PLOF:  independent with all ADL/iADLs, ambulation without AD  Ls:pt lives with her daughter in a private house with 1 platform step to enter and 3+10 steps upstairs to bedroom and bathroom area with rails.    Prior to admission to rehab current level of function on 8/16 with PT wa: transfers Kimo, ambulation Kimo with TW 30ft. On 8/16 with OT pt was Kimo bed mobility, Kimo transfers, Kimo grooming. And on 8/15 pt was LBD modA, UBD Kimo SHx: History of tobacco use, no recent. Denies ETOH use    PLOF: Fully independent with all ADL/iADLs, ambulation without AD  Ls:pt lives with her daughter in a private house with 1 platform step to enter and 3+10 steps upstairs to bedroom and bathroom area with rails.

## 2023-08-17 NOTE — PATIENT PROFILE ADULT - FUNCTIONAL ASSESSMENT - BASIC MOBILITY 6.
2-calculated by average/Not able to assess (calculate score using [facility] averaging method)

## 2023-08-17 NOTE — H&P ADULT - NSHPLABSRESULTS_GEN_ALL_CORE
08-17    138  |  106  |  36<H>  ----------------------------<  92  4.2   |  21  |  1.1    Ca    8.8      17 Aug 2023 06:04        Urinalysis Basic - ( 17 Aug 2023 06:04 )    Color: x / Appearance: x / SG: x / pH: x  Gluc: 92 mg/dL / Ketone: x  / Bili: x / Urobili: x   Blood: x / Protein: x / Nitrite: x   Leuk Esterase: x / RBC: x / WBC x   Sq Epi: x / Non Sq Epi: x / Bacteria: x      < from: Xray Ankle 2 Views, Right (08.11.23 @ 12:50) >    Findings/  impression:    Bony demineralization. Mildly displaced distal fibular fracture. Ankle   mortise appears preserved. Calcaneal spurring. Soft tissue swelling.    < end of copied text >    < from: MR Head w/wo IV Cont (08.10.23 @ 14:25) >    IMPRESSION:    Acute infarcts in the left temporal lobe, left parietal lobe, and left   insular cortex.    Patchy subacute/acute infarct in the right frontal subcortical region   superimposed on small chronic lacunar infarcts.    Mild chronic microvascular ischemic changes and scattered chronic   infarcts.    < end of copied text >    < from: CT Angio Neck w/ IV Cont (08.10.23 @ 00:52) >    IMPRESSION:  1.  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  2.  Irregular stenosis involving multiple M2 branches of the right MCA   and distal right TUCKER.  3.  Multifocal stenosis in the bilateral posterior cerebral arteries.    < end of copied text >    < from: CT Head No Cont (08.10.23 @ 00:52) >    IMPRESSION:  1.  No definite acute intracranial pathology.  2.  Moderate sized chronic left occipital infarct.  3.  Multiple age-indeterminate lacunar infarcts along the right coronal   radiata.  4.  Indeterminate confluent hypodensity in the right frontal subcortical   white matter without mass effect. Nonspecific and differential can   include focus of gliosis, demyelination, chronic microvascular changes   amongst others. If symptoms persist brain MRI can be considered for   further evaluation.    < end of copied text >    < from: CT Angio Head w/ IV Cont (08.10.23 @ 00:50) >    IMPRESSION:  1.  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  2.  Irregular stenosis involving multiple M2 branches of the right MCA   and distal right TUCKER.  3.  Multifocal stenosis in the bilateral posterior cerebral arteries.    < end of copied text >    < from: Xray Chest 1 View-PORTABLE IMMEDIATE (08.10.23 @ 00:35) >    Impression:    No radiographic evidence of acute cardiopulmonary disease.    < end of copied text > 08-17    138  |  106  |  36<H>  ----------------------------<  92  4.2   |  21  |  1.1    Ca    8.8      17 Aug 2023 06:04    Complete Blood Count + Automated Diff (08.12.23 @ 05:53)    WBC Count: 8.90 K/uL    RBC Count: 4.86 M/uL    Hemoglobin: 14.1 g/dL    Hematocrit: 43.5 %    Mean Cell Volume: 89.5 fL    Mean Cell Hemoglobin: 29.0 pg    Mean Cell Hemoglobin Conc: 32.4 g/dL    Red Cell Distrib Width: 13.6 %    Platelet Count - Automated: 235 K/uL    < from: TTE Echo Complete w/o Contrast w/ Doppler (08.10.23 @ 14:14) >    Summary:   1. Normal global left ventricular systolic function.   2. LV Ejection Fraction by Martinez's Method with a biplane EF of 65 %.   3. Normal left atrial size.   4. There is no evidence of pericardial effusion.   5. Mild mitralvalve regurgitation.    < end of copied text >        < from: Xray Ankle 2 Views, Right (08.11.23 @ 12:50) >    Findings/  impression:    Bony demineralization. Mildly displaced distal fibular fracture. Ankle   mortise appears preserved. Calcaneal spurring. Soft tissue swelling.    < end of copied text >    < from: MR Head w/wo IV Cont (08.10.23 @ 14:25) >    IMPRESSION:    Acute infarcts in the left temporal lobe, left parietal lobe, and left   insular cortex.    Patchy subacute/acute infarct in the right frontal subcortical region   superimposed on small chronic lacunar infarcts.    Mild chronic microvascular ischemic changes and scattered chronic   infarcts.    < end of copied text >    < from: CT Angio Neck w/ IV Cont (08.10.23 @ 00:52) >    IMPRESSION:  1.  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  2.  Irregular stenosis involving multiple M2 branches of the right MCA   and distal right TUCKER.  3.  Multifocal stenosis in the bilateral posterior cerebral arteries.    < end of copied text >

## 2023-08-17 NOTE — H&P ADULT - REASON FOR ADMISSION
Acute rehabilitation of gait disorder secondary to multiple intracranial vascular territory infarcts, likely 2/2 thromboembolic event in setting of intracranial vasculopathy and right ankle fracture of lateral malleolus

## 2023-08-17 NOTE — CHART NOTE - NSCHARTNOTEFT_GEN_A_CORE
patient has a bed on [facility] [facility], discharge order placed, notified Dr. Campo

## 2023-08-17 NOTE — H&P ADULT - ASSESSMENT
ASSESSMENT/PLAN  78 year old female with no known medical history (does not see a health care provider for past 20 years) brought in by son to the emergency room for confusion. As per son, patient was noted  during a phone conversation to be confused, and when he got home the confusion was worse. Unsure of last time of known welll. evaluated by neurology and ICU. Pt presented with aphasia. taken to ICU for further neuro monitoring.  labs showed leukocytosis with + nitrite on UA), s/p fall pt with R ankle fracture seen by ortho and managed conservatively with CAM boot.  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       Rehab of gait disorder secondary to multiple intracranial vascular territory infarcts, likely 2/2 thromboembolic event in setting of intracranial vasculopathy and right ankle fracture of lateral malleolus  #CVA; Acute Left temporal, L parietal, and L insular  - Continue DAPT 81mg ASA QD, Plavix 75mg QD, high dose statin 80mg atorvastatin 80mg qhs  - maintain normotensive SBP goals 140-180.   - continue PT/OT/SLP   GERARDO planned for 8/14 -- refused by patient and family.   TTE 8/10 revealed: normal LV function with EF 65%, mild MVR. no PFO or thrombus  -MCOT set up, pt does not want procedures done: such as loop recorder    #HTN  SBP goal per neuro 140-180  - c/w Norvasc 10mg QD  - can consider labetalol for spot dose hypertensive episodes prn    #R lateral malleolus ankle fracture  - WBAT in CAM boot  - conservative non-surgical management  - Pain control tylenol 650mg q6hr PRN  - followup with orthopedics, Dr. Enoc Perez in 2 weeks orthopedics.     #E.coli UTI  - Pt started on CTX for abx treatment    #Misc  DVT ppx: SQH  Zofran for nausea  GI/bowel ppx: protonix, senna qhs  Diet: DASH/TLC diet  Code status: Full Code  -Skin: No active issues at this time    Precautions / PROPHYLAXIS:      - Falls, safety    - Ortho: Weight bearing status: WABT with CAM boot of RLE          MEDICAL PROGNOSIS: GOOD            REHAB POTENTIAL: GOOD             ESTIMATED DISPOSITION: HOME WITH HOME CARE       [ x ]  The goals of the IRF admission were discussed with the patient and or family member, who agreed             ELOS:  [  x  ] 7-14    [    ]  14-21    [    ]  Other    THERAPY ORDERS and INITIAL INDIVIDUALIZED PLAN OF CARE:  This initial individualized interdisciplinary plan of care, which was established by me (the attending physiatrist), is based on elements from the post admission evaluation. The interdisciplinary therapy program is to be at least 3 hrs a day, at least 5 days per week from from physical, occupational and/ or speech therapies as ordered by me below.      [ x  ] P.T. 90 mins. /day at least 5 out of 7 days:  [  x ] superficial  modalities prn, [ x  ] A/AAROM, [ x  ] PREs, [ x  ] transfer training,            [ x  ] progressive ambulation, [x   ] stairs                                               [ x  ] O.T. 90 mins. /day at least 5 out of 7 days::  [ x  ] modalities prn,  [ x  ]A/AAROM, [ x  ] PREs, [  x ] functional transfer training, [ x  ] ADL training           [   ] cognitive/ perceptual eval and training, [   ] splint eval                                                  [x   ] S.L.P:  [ x  ] speech eval, [ x  ] swallow eval     [ x  ] Neuropsychology eval     [ x  ] Individualized rec. therapy      RATIONALE FOR INPATIENT ADMISSION - Patient demonstrates the following: (check all that apply)  [X] Medically appropriate for acute rehabilitation admission. Requires interdisiplinary therapy consisting of at least PT and OT, at least 3 hrs. a day at least 5 days a week  [X] Has attainable rehab goals with an appropriate initial discharge plan  [X] Has rehabilitation potential (expected to make a significant improvement within a reasonable period of time)  [X] Requires close medical management by a rehab physician, rehab nursing care,  and comprehensive interdisciplinary team (including PT, OT)    [X] Requires evaluation by a physiatrist at least 3 days a week to evaluate and manage and coordinate rehab and medical problems   ASSESSMENT/PLAN  78 year old female with no known medical history (does not see a health care provider for past 20 years) brought in by son to the emergency room for confusion. As per son, patient was noted  during a phone conversation to be confused, and when he got home the confusion was worse. Unsure of last time of known welll. evaluated by neurology and ICU. Pt presented with aphasia. taken to ICU for further neuro monitoring.  labs showed leukocytosis with + nitrite on UA), s/p fall pt with R ankle fracture seen by ortho and managed conservatively with CAM boot.  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       #Rehab of gait disorder secondary to multiple intracranial vascular territory infarcts, likely 2/2 thromboembolic event in setting of intracranial vasculopathy, and right ankle fracture of lateral malleolus  #CVA; Acute Left temporal, L parietal, and L insular  - Continue DAPT 81mg ASA QD, Plavix 75mg QD, high dose statin 80mg atorvastatin 80mg qhs  - maintain SBP goals 140-180.   - continue PT/OT/SLP   GERARDO planned for 8/14 -- refused by patient and family.   TTE 8/10 revealed: normal LV function with EF 65%, mild MVR. no PFO or thrombus  -MCOT set up prior to d/c, pt does not want procedures done: such as loop recorder    #HTN  SBP goal per neuro 140-180  - c/w Norvasc 10mg QD  - can consider labetalol for spot dose hypertensive episodes prn    #R lateral malleolus ankle fracture  - WBAT in CAM boot  - conservative non-surgical management  - Pain control tylenol 650mg q6hr PRN  - followup with orthopedics, Dr. Enoc Perez in 2 weeks orthopedics.     #s/p E.coli UTI  - Pt started on CTX for abx treatment    #Misc  DVT ppx: SQH  Zofran for nausea  GI/bowel ppx: protonix, senna qhs  Diet: DASH/TLC diet  Code status: Full Code  -Skin: No active issues at this time    Precautions / PROPHYLAXIS:      - Falls, safety    - Ortho: Weight bearing status: WABT with CAM boot of RLE          MEDICAL PROGNOSIS: GOOD            REHAB POTENTIAL: GOOD             ESTIMATED DISPOSITION: HOME WITH HOME CARE       [ x ]  The goals of the IRF admission were discussed with the patient and or family member, who agreed             ELOS:  [  x  ] 7-14    [    ]  14-21    [    ]  Other    THERAPY ORDERS and INITIAL INDIVIDUALIZED PLAN OF CARE:  This initial individualized interdisciplinary plan of care, which was established by me (the attending physiatrist), is based on elements from the post admission evaluation. The interdisciplinary therapy program is to be at least 3 hrs a day, at least 5 days per week from from physical, occupational and/ or speech therapies as ordered by me below.      [ x  ] P.T. 90 mins. /day at least 5 out of 7 days:  [  x ] superficial  modalities prn, [ x  ] A/AAROM, [ x  ] PREs, [ x  ] transfer training,            [ x  ] progressive ambulation, [x   ] stairs                                               [ x  ] O.T. 90 mins. /day at least 5 out of 7 days::  [ x  ] modalities prn,  [ x  ]A/AAROM, [ x  ] PREs, [  x ] functional transfer training, [ x  ] ADL training           [ x  ] cognitive/ perceptual eval and training, [   ] splint eval                                                  [x   ] S.L.P:  [ x  ] speech eval, [ x  ] swallow eval     [ x  ] Neuropsychology eval     [ x  ] Individualized rec. therapy      RATIONALE FOR INPATIENT ADMISSION - Patient demonstrates the following: (check all that apply)  [X] Medically appropriate for acute rehabilitation admission. Requires interdisiplinary therapy consisting of at least PT and OT, at least 3 hrs. a day at least 5 days a week  [X] Has attainable rehab goals with an appropriate initial discharge plan  [X] Has rehabilitation potential (expected to make a significant improvement within a reasonable period of time)  [X] Requires close medical management by a rehab physician, rehab nursing care,  and comprehensive interdisciplinary team (including PT, OT)    [X] Requires evaluation by a physiatrist at least 3 days a week to evaluate and manage and coordinate rehab and medical problems   ASSESSMENT/PLAN  78 year old female with no known medical history (does not see a health care provider for past 20 years) brought in by son to the emergency room for confusion. As per son, patient was noted  during a phone conversation to be confused, and when he got home the confusion was worse. Unsure of last time of known welll. evaluated by neurology and ICU. Pt presented with aphasia. taken to ICU for further neuro monitoring.  labs showed leukocytosis with + nitrite on UA), s/p fall pt with R ankle fracture seen by ortho and managed conservatively with CAM boot.  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       #Rehab of gait disorder secondary to multiple intracranial vascular territory infarcts, likely 2/2 thromboembolic event in setting of intracranial vasculopathy, and right ankle fracture of lateral malleolus  #CVA; Acute Left temporal, L parietal, and L insular  - Continue DAPT 81mg ASA QD, Plavix 75mg QD, high dose statin 80mg atorvastatin 80mg qhs  - maintain SBP goals 140-180.   - continue PT/OT/SLP   GERARDO planned for 8/14 -- refused by patient and family.   TTE 8/10 revealed: normal LV function with EF 65%, mild MVR. no PFO or thrombus  -MCOT set up prior to d/c, pt does not want procedures done: such as loop recorder    #Aphasia  - ST/ Neuropsych eval    #HTN  SBP goal per neuro 140-180  - c/w Norvasc 10mg QD    #R lateral malleolus ankle fracture  - WBAT in CAM boot  - conservative non-surgical management  - Pain control tylenol 650mg q6hr PRN  - followup with orthopedics, Dr. Enoc Perez in 2 weeks orthopedics.     #s/p E.coli UTI  - Pt started on CTX for abx treatment  - new urinary incontinence. Will get bladder scan PVR r/o retention    #Misc  DVT ppx: SQH  Zofran for nausea  GI/bowel ppx: protonix, senna qhs  Diet: DASH/TLC diet  Code status: Full Code  -Skin: No active issues at this time    Precautions / PROPHYLAXIS:      - Falls, safety    - Ortho: Weight bearing status: WABT with CAM boot of RLE          MEDICAL PROGNOSIS: GOOD            REHAB POTENTIAL: GOOD             ESTIMATED DISPOSITION: HOME WITH HOME CARE       [ x ]  The goals of the IRF admission were discussed with the patient and family member, who agreed             ELOS:  [  x  ] 7-14    [    ]  14-21    [    ]  Other    THERAPY ORDERS and INITIAL INDIVIDUALIZED PLAN OF CARE:  This initial individualized interdisciplinary plan of care, which was established by me (the attending physiatrist), is based on elements from the post admission evaluation. The interdisciplinary therapy program is to be at least 3 hrs a day, at least 5 days per week from from physical, occupational and/ or speech therapies as ordered by me below.      [ x  ] P.T. 90 mins. /day at least 5 out of 7 days:  [  x ] superficial  modalities prn, [ x  ] A/AAROM, [ x  ] PREs, [ x  ] transfer training,            [ x  ] progressive ambulation, [x   ] stairs                                               [ x  ] O.T. 90 mins. /day at least 5 out of 7 days::  [ x  ] modalities prn,  [ x  ]A/AAROM, [ x  ] PREs, [  x ] functional transfer training, [ x  ] ADL training           [ x  ] cognitive/ perceptual eval and training, [   ] splint eval                                                  [x   ] S.L.P:  [ x  ] speech eval, [ x  ] swallow eval     [ x  ] Neuropsychology eval     [ x  ] Individualized rec. therapy      RATIONALE FOR INPATIENT ADMISSION - Patient demonstrates the following: (check all that apply)  [X] Medically appropriate for acute rehabilitation admission. Requires interdisiplinary therapy consisting of at least PT and OT, at least 3 hrs. a day at least 5 days a week  [X] Has attainable rehab goals with an appropriate initial discharge plan  [X] Has rehabilitation potential (expected to make a significant improvement within a reasonable period of time)  [X] Requires close medical management by a rehab physician, rehab nursing care,  and comprehensive interdisciplinary team (including PT, OT)    [X] Requires evaluation by a physiatrist at least 3 days a week to evaluate and manage and coordinate rehab and medical problems   ASSESSMENT/PLAN  78 year old female with no known medical history (does not see a health care provider for past 20 years) brought in by son to the emergency room for confusion. As per son, patient was noted  during a phone conversation to be confused, and when he got home the confusion was worse. Unsure of last time of known welll. evaluated by neurology and ICU. Pt presented with aphasia. taken to ICU for further neuro monitoring.  labs showed leukocytosis with + nitrite on UA), s/p fall pt with R ankle fracture seen by ortho and managed conservatively with CAM boot.  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       #Rehab of gait disorder secondary to multiple intracranial vascular territory infarcts, likely 2/2 thromboembolic event in setting of intracranial vasculopathy, and right ankle fracture of lateral malleolus  #CVA; Acute Left temporal, L parietal, and L insular  - Continue DAPT 81mg ASA QD, Plavix 75mg QD, high dose statin 80mg atorvastatin 80mg qhs  - maintain SBP goals 140-180.   - continue PT/OT/SLP   GERARDO planned for 8/14 -- refused by patient and family.   TTE 8/10 revealed: normal LV function with EF 65%, mild MVR. no PFO or thrombus  -MCOT set up prior to d/c, pt does not want procedures done: such as loop recorder    #Aphasia  - ST/ Neuropsych eval    #Azotemia vs dehydration  - promote oral hydration   - consideration for IVF  - follow up on routine labwork    #HTN  SBP goal per neuro 140-180  - c/w Norvasc 10mg QD    #R lateral malleolus ankle fracture  - WBAT in CAM boot  - conservative non-surgical management  - Pain control tylenol 650mg q6hr PRN  - followup with orthopedics, Dr. Enoc Perez in 2 weeks orthopedics.     #s/p E.coli UTI  - Pt started on CTX for abx treatment  - new urinary incontinence. Will get bladder scan PVR r/o retention    #Misc  DVT ppx: SQH  Zofran for nausea  GI/bowel ppx: protonix, senna qhs  Diet: DASH/TLC diet  Code status: Full Code  -Skin: No active issues at this time    Precautions / PROPHYLAXIS:      - Falls, safety    - Ortho: Weight bearing status: WABT with CAM boot of RLE          MEDICAL PROGNOSIS: GOOD            REHAB POTENTIAL: GOOD             ESTIMATED DISPOSITION: HOME WITH HOME CARE       [ x ]  The goals of the IRF admission were discussed with the patient and family member, who agreed             ELOS:  [  x  ] 7-14    [    ]  14-21    [    ]  Other    THERAPY ORDERS and INITIAL INDIVIDUALIZED PLAN OF CARE:  This initial individualized interdisciplinary plan of care, which was established by me (the attending physiatrist), is based on elements from the post admission evaluation. The interdisciplinary therapy program is to be at least 3 hrs a day, at least 5 days per week from from physical, occupational and/ or speech therapies as ordered by me below.      [ x  ] P.T. 90 mins. /day at least 5 out of 7 days:  [  x ] superficial  modalities prn, [ x  ] A/AAROM, [ x  ] PREs, [ x  ] transfer training,            [ x  ] progressive ambulation, [x   ] stairs                                               [ x  ] O.T. 90 mins. /day at least 5 out of 7 days::  [ x  ] modalities prn,  [ x  ]A/AAROM, [ x  ] PREs, [  x ] functional transfer training, [ x  ] ADL training           [ x  ] cognitive/ perceptual eval and training, [   ] splint eval                                                  [x   ] S.L.P:  [ x  ] speech eval, [ x  ] swallow eval     [ x  ] Neuropsychology eval     [ x  ] Individualized rec. therapy      RATIONALE FOR INPATIENT ADMISSION - Patient demonstrates the following: (check all that apply)  [X] Medically appropriate for acute rehabilitation admission. Requires interdisiplinary therapy consisting of at least PT and OT, at least 3 hrs. a day at least 5 days a week  [X] Has attainable rehab goals with an appropriate initial discharge plan  [X] Has rehabilitation potential (expected to make a significant improvement within a reasonable period of time)  [X] Requires close medical management by a rehab physician, rehab nursing care,  and comprehensive interdisciplinary team (including PT, OT)    [X] Requires evaluation by a physiatrist at least 3 days a week to evaluate and manage and coordinate rehab and medical problems

## 2023-08-17 NOTE — CHART NOTE - NSCHARTNOTEFT_GEN_A_CORE
MCOT applied at bedside for continuous op cardiac monitoring   Pt  provided with teachback instructions on care and management of device  f/u with cardiology outpatient

## 2023-08-17 NOTE — PATIENT PROFILE ADULT - FALL HARM RISK - HARM RISK INTERVENTIONS

## 2023-08-17 NOTE — H&P ADULT - NSHPREVIEWOFSYSTEMS_GEN_ALL_CORE
Constiutional:    [   ] WNL           [   ] poor appetite   [   ] insomnia   [   ] tired   Cardio:                [   ] WNL           [   ] CP   [   ] SARMIENTO   [   ] palpitations               Resp:                   [   ] WNL           [   ] SOB   [   ] cough   [   ] wheezing   GI:                        [   ] WNL           [   ] constipation   [   ] diarrhea   [   ] abdominal pain   [   ] nausea   [   ] emesis                                :                      [   ] WNL           [   ] ZAVALA  [   ] dusuria   [   ] difficulty voiding             Endo:                   [   ] WNL          [   ] po;yuria   [   ] temperature intolerance                 Skin:                     [   ] WNL          [   ] pain   [   ] wound   [   ] rash   MSK:                    [   ] WNL          [   ] muscle pain   [   ] joint pain/ stiffness   [   ] muscle tenderness   [   ] swelling   Neuro:                 [   ] WNL          [   ] HA   [   ] change in vision   [   ] tremor   [   ] weakness   [   ]dysphagia              Cognitive:           [   ] WNL           [   ]confusion      Psych:                  [   ] WNL           [   ] hallucinations   [   ]agitation   [   ] delusion   [   ]depression Constitutional:    [ x  ] WNL           [   ] poor appetite   [   ] insomnia   [   ] tired   Cardio:                [x   ] WNL           [   ] CP   [   ] SARMIENTO   [   ] palpitations               Resp:                   [ x  ] WNL           [   ] SOB   [   ] cough   [   ] wheezing   GI:                        [   ] WNL           [ x  ] constipation - no BM since admission   [   ] diarrhea   [   ] abdominal pain   [   ] nausea   [   ] emesis                                :                      [   ] WNL           [   ] ZAVALA  [   ] dysuria   [   ] difficulty voiding        [ x ]  new urinary incontinence     Endo:                   [x   ] WNL          [   ] po;yuria   [   ] temperature intolerance                 Skin:                     [x   ] WNL          [   ] pain   [   ] wound   [   ] rash   MSK:                    [    ] WNL          [   ] muscle pain   [   x] joint pain/ stiffness right ankle   [   ] muscle tenderness   [   ] swelling   Neuro:                 [   ] WNL per HPI          [   ] HA   [   ] change in vision   [   ] tremor   [   ] weakness   [   ]dysphagia              Cognitive:           [   ] WNL           [ x  ]confusion  with recnet UTI, not baseline   Psych:                  [ x  ] WNL           [   ] hallucinations   [   ]agitation   [   ] delusion   [   ]depression

## 2023-08-17 NOTE — H&P ADULT - HISTORY OF PRESENT ILLNESS
Patient is 77 yo female with hx of AMS in the past for UTI who has not seen a physician for over 20 yrs presenting with AMS that started yesterday, and progressivenly worsen. As per son, patient has been doing ok, yesterday morning, and around noon time, she started to get confused.  Offers no other complaints.  At baseline pt has no cognitive impairment per family. Significant labs/vitals for BP at 213/94 on presentation, WBC 15, nitrite+ on UA.    Pt evaluated by neurology and ICU and found to have NIHSS of 4 due to 2 questions and aphasia on initial presentation on 8/10/23.    CT Head Non-Contrast   - No definite acute intracranial pathology. Moderate sized chronic left occipital infarct. Multiple age-indeterminate lacunar infarcts along the right coronal  radiata.     - Indeterminate confluent hypodensity in the right frontal subcortical white matter without mass effect. Nonspecific and differential can include focus of gliosis, demyelination,    chronic microvascular changes amongst others.     CT Angio Head w/ IV Cont:    - Focal occlusion in the proximal inferior M2 branch of the left MCA with diminutive enhancement in the distal branches.   - Irregular stenosis involving multiple M2 branches of the right MCA and distal right TUCKER.   - Multifocal stenosis in the bilateral posterior cerebral arteries.    MRI reveals Acute infarcts in the left temporal lobe, left parietal lobe, and left insular cortex. Patchy subacute/acute infarct in the right frontal subcortical region superimposed on small chronic lacunar infarcts. Mild chronic microvascular ischemic changes and scattered chronic infarcts.     Pt was seen by orthopedics s/p fall with ankle pain and found to have R lateral malleolus ankle fracture to be conservatively managed with CAM boot and be WBAT. Pt was initially planned for GERARDO but patient/family refused GERARDO and loop recorder placement. MCOT to be set up on day of discharge.     obtained TTE 8/10:    1. Normal global left ventricular systolic function.   2. LV Ejection Fraction by Martinez's Method with a biplane EF of 65 %.   3. Normal left atrial size.   4. There is no evidence of pericardial effusion.   5. Mild mitral valve regurgitation.    The patient was evaluated by the PM&R team once medically stable. The patient was found to have functional limitation in terms of muscle strength, endurance, physical mobility, and ability to carry out activities of daily living (self care, transfers, and ambulation). The patient was started on a course of bedside therapy, the pt is motivated and able to start 3 hours of therapy  daily for 6-7 days a week. the patient was deemed to be a good candidate for admission for acute inpatient rehab. The patient was admitted to acute inpatient rehab on 8/17/23.    Prior to admission to rehab current level of function on 8/16 with PT was: transfers Kimo, ambulation Kimo with TW 30ft. On 8/16 with OT pt was Kimo bed mobility, Kimo transfers, Kimo grooming. And on 8/15 pt was TRICIA modA, GUSTAVO Kimo Patient is 79 yo female with hx of AMS in the past for UTI who has not seen a physician for over 20 yrs presenting with AMS that started yesterday, and progressively worsening. As per son, patient has been doing ok, yesterday morning, and around noon time, she started to get confused.  Offers no other complaints.  At baseline pt has no cognitive impairment per family. Significant labs/vitals for BP at 213/94 on presentation, WBC 15, nitrite+ on UA.    Pt evaluated by neurology and ICU and found to have NIHSS of 4 due to 2 questions and aphasia on initial presentation on 8/10/23.    CT Head Non-Contrast   - No definite acute intracranial pathology. Moderate sized chronic left occipital infarct. Multiple age-indeterminate lacunar infarcts along the right coronal  radiata.     - Indeterminate confluent hypodensity in the right frontal subcortical white matter without mass effect. Nonspecific and differential can include focus of gliosis, demyelination,    chronic microvascular changes amongst others.     CT Angio Head w/ IV Cont:    - Focal occlusion in the proximal inferior M2 branch of the left MCA with diminutive enhancement in the distal branches.   - Irregular stenosis involving multiple M2 branches of the right MCA and distal right TUCKER.   - Multifocal stenosis in the bilateral posterior cerebral arteries.    MRI reveals Acute infarcts in the left temporal lobe, left parietal lobe, and left insular cortex. Patchy subacute/acute infarct in the right frontal subcortical region superimposed on small chronic lacunar infarcts. Mild chronic microvascular ischemic changes and scattered chronic infarcts.     Pt was seen by orthopedics s/p fall with ankle pain and found to have R lateral malleolus ankle fracture to be conservatively managed with CAM boot and be WBAT. Pt was initially planned for GERARDO but patient/family refused GERARDO and loop recorder placement. MCOT to be set up on day of discharge.     obtained TTE 8/10:    1. Normal global left ventricular systolic function.   2. LV Ejection Fraction by Martinez's Method with a biplane EF of 65 %.   3. Normal left atrial size.   4. There is no evidence of pericardial effusion.   5. Mild mitral valve regurgitation.    The patient was evaluated by the PM&R team once medically stable. The patient was found to have functional limitation in terms of muscle strength, endurance, physical mobility, and ability to carry out activities of daily living (self care, transfers, and ambulation). The patient was started on a course of bedside therapy, She currently requires CG for bed mobility, min assist for transfers and to ambulate with a RW 30 feet in right CAM Boot WBAT, and min assist for UE dessing, mod assist for LE dressing. The pt is motivated and able to start 3 hours of therapy  daily for 6-7 days a week. the patient was deemed to be a good candidate for admission for acute inpatient rehab. The patient was admitted to acute inpatient rehab on 8/17/23.   Patient is 77 yo female with hx of AMS in the past for UTI who has not seen a physician for over 20 yrs presenting with AMS that started yesterday, and progressively worsening. As per son, patient has been doing ok, yesterday morning, and around noon time, she started to get confused.  Offers no other complaints.  At baseline pt has no cognitive impairment per family. Significant labs/vitals for BP at 213/94 on presentation, WBC 15, nitrite+ on UA.    Pt evaluated by neurology and ICU and found to have NIHSS of 4 due to 2 questions and aphasia on initial presentation on 8/10/23.    CT Head Non-Contrast   - No definite acute intracranial pathology. Moderate sized chronic left occipital infarct. Multiple age-indeterminate lacunar infarcts along the right coronal  radiata.     - Indeterminate confluent hypodensity in the right frontal subcortical white matter without mass effect. Nonspecific and differential can include focus of gliosis, demyelination,    chronic microvascular changes amongst others.     CT Angio Head w/ IV Cont:    - Focal occlusion in the proximal inferior M2 branch of the left MCA with diminutive enhancement in the distal branches.   - Irregular stenosis involving multiple M2 branches of the right MCA and distal right TUCKER.   - Multifocal stenosis in the bilateral posterior cerebral arteries.    MRI reveals Acute infarcts in the left temporal lobe, left parietal lobe, and left insular cortex. Patchy subacute/acute infarct in the right frontal subcortical region superimposed on small chronic lacunar infarcts. Mild chronic microvascular ischemic changes and scattered chronic infarcts.     Pt was seen by orthopedics s/p fall with ankle pain and found to have R lateral malleolus ankle fracture to be conservatively managed with CAM boot and be WBAT. Pt was initially planned for GERARDO but patient/family refused GERARDO and loop recorder placement. MCOT to be set up on day of discharge.     obtained TTE 8/10:    1. Normal global left ventricular systolic function.   2. LV Ejection Fraction by Martinez's Method with a biplane EF of 65 %.   3. Normal left atrial size.   4. There is no evidence of pericardial effusion.   5. Mild mitral valve regurgitation.    The patient was evaluated by the PM&R team once medically stable. The patient was found to have functional limitation in terms of muscle strength, endurance, physical mobility, and ability to carry out activities of daily living (self care, transfers, and ambulation). The patient was started on a course of bedside therapy, She currently requires CG for bed mobility, min assist for transfers and to ambulate with a RW 30 feet in right CAM Boot WBAT, and min assist for UE dessing, mod assist for LE dressing. The pt is motivated and able to start 3 hours of therapy  daily for 6-7 days a week. the patient was deemed to be a good candidate for admission for acute inpatient rehab. The patient was admitted to acute inpatient rehab on 8/17/23.

## 2023-08-17 NOTE — DISCHARGE NOTE NURSING/CASE MANAGEMENT/SOCIAL WORK - NSDCPEFALRISK_GEN_ALL_CORE
For information on Fall & Injury Prevention, visit: [email or web address] OR  [email or web address] OR  https://www.cdc.gov/steadi/patient.html

## 2023-08-18 LAB
ALBUMIN SERPL ELPH-MCNC: 3.8 G/DL — SIGNIFICANT CHANGE UP (ref 3.5–5.2)
ALP SERPL-CCNC: 173 U/L — HIGH (ref 30–115)
ALT FLD-CCNC: 54 U/L — HIGH (ref 0–41)
ANION GAP SERPL CALC-SCNC: 11 MMOL/L — SIGNIFICANT CHANGE UP (ref 7–14)
AST SERPL-CCNC: 41 U/L — SIGNIFICANT CHANGE UP (ref 0–41)
BASOPHILS # BLD AUTO: 0.05 K/UL — SIGNIFICANT CHANGE UP (ref 0–0.2)
BASOPHILS NFR BLD AUTO: 0.5 % — SIGNIFICANT CHANGE UP (ref 0–1)
BILIRUB SERPL-MCNC: 0.8 MG/DL — SIGNIFICANT CHANGE UP (ref 0.2–1.2)
BUN SERPL-MCNC: 29 MG/DL — HIGH (ref 10–20)
CALCIUM SERPL-MCNC: 9.1 MG/DL — SIGNIFICANT CHANGE UP (ref 8.4–10.5)
CHLORIDE SERPL-SCNC: 108 MMOL/L — SIGNIFICANT CHANGE UP (ref 98–110)
CO2 SERPL-SCNC: 21 MMOL/L — SIGNIFICANT CHANGE UP (ref 17–32)
CREAT SERPL-MCNC: 0.9 MG/DL — SIGNIFICANT CHANGE UP (ref 0.7–1.5)
EGFR: 66 ML/MIN/1.73M2 — SIGNIFICANT CHANGE UP
EOSINOPHIL # BLD AUTO: 0.31 K/UL — SIGNIFICANT CHANGE UP (ref 0–0.7)
EOSINOPHIL NFR BLD AUTO: 2.8 % — SIGNIFICANT CHANGE UP (ref 0–8)
GLUCOSE SERPL-MCNC: 104 MG/DL — HIGH (ref 70–99)
HCT VFR BLD CALC: 44.9 % — SIGNIFICANT CHANGE UP (ref 37–47)
HGB BLD-MCNC: 15.1 G/DL — SIGNIFICANT CHANGE UP (ref 12–16)
IMM GRANULOCYTES NFR BLD AUTO: 0.3 % — SIGNIFICANT CHANGE UP (ref 0.1–0.3)
LYMPHOCYTES # BLD AUTO: 1.52 K/UL — SIGNIFICANT CHANGE UP (ref 1.2–3.4)
LYMPHOCYTES # BLD AUTO: 13.7 % — LOW (ref 20.5–51.1)
MAGNESIUM SERPL-MCNC: 2.1 MG/DL — SIGNIFICANT CHANGE UP (ref 1.8–2.4)
MCHC RBC-ENTMCNC: 29.9 PG — SIGNIFICANT CHANGE UP (ref 27–31)
MCHC RBC-ENTMCNC: 33.6 G/DL — SIGNIFICANT CHANGE UP (ref 32–37)
MCV RBC AUTO: 88.9 FL — SIGNIFICANT CHANGE UP (ref 81–99)
MONOCYTES # BLD AUTO: 1.12 K/UL — HIGH (ref 0.1–0.6)
MONOCYTES NFR BLD AUTO: 10.1 % — HIGH (ref 1.7–9.3)
NEUTROPHILS # BLD AUTO: 8.03 K/UL — HIGH (ref 1.4–6.5)
NEUTROPHILS NFR BLD AUTO: 72.6 % — SIGNIFICANT CHANGE UP (ref 42.2–75.2)
NRBC # BLD: 0 /100 WBCS — SIGNIFICANT CHANGE UP (ref 0–0)
PLATELET # BLD AUTO: 231 K/UL — SIGNIFICANT CHANGE UP (ref 130–400)
PMV BLD: 10.8 FL — HIGH (ref 7.4–10.4)
POTASSIUM SERPL-MCNC: 3.9 MMOL/L — SIGNIFICANT CHANGE UP (ref 3.5–5)
POTASSIUM SERPL-SCNC: 3.9 MMOL/L — SIGNIFICANT CHANGE UP (ref 3.5–5)
PROT SERPL-MCNC: 6.5 G/DL — SIGNIFICANT CHANGE UP (ref 6–8)
RBC # BLD: 5.05 M/UL — SIGNIFICANT CHANGE UP (ref 4.2–5.4)
RBC # FLD: 13.5 % — SIGNIFICANT CHANGE UP (ref 11.5–14.5)
SODIUM SERPL-SCNC: 140 MMOL/L — SIGNIFICANT CHANGE UP (ref 135–146)
WBC # BLD: 11.06 K/UL — HIGH (ref 4.8–10.8)
WBC # FLD AUTO: 11.06 K/UL — HIGH (ref 4.8–10.8)

## 2023-08-18 RX ORDER — SODIUM CHLORIDE 9 MG/ML
1000 INJECTION, SOLUTION INTRAVENOUS
Refills: 0 | Status: COMPLETED | OUTPATIENT
Start: 2023-08-18 | End: 2023-08-18

## 2023-08-18 RX ORDER — CHLORHEXIDINE GLUCONATE 213 G/1000ML
1 SOLUTION TOPICAL
Refills: 0 | Status: DISCONTINUED | OUTPATIENT
Start: 2023-08-18 | End: 2023-08-31

## 2023-08-18 RX ADMIN — ATORVASTATIN CALCIUM 80 MILLIGRAM(S): 80 TABLET, FILM COATED ORAL at 22:02

## 2023-08-18 RX ADMIN — Medication 81 MILLIGRAM(S): at 12:59

## 2023-08-18 RX ADMIN — HEPARIN SODIUM 5000 UNIT(S): 5000 INJECTION INTRAVENOUS; SUBCUTANEOUS at 17:23

## 2023-08-18 RX ADMIN — AMLODIPINE BESYLATE 10 MILLIGRAM(S): 2.5 TABLET ORAL at 06:28

## 2023-08-18 RX ADMIN — POLYETHYLENE GLYCOL 3350 17 GRAM(S): 17 POWDER, FOR SOLUTION ORAL at 17:23

## 2023-08-18 RX ADMIN — POLYETHYLENE GLYCOL 3350 17 GRAM(S): 17 POWDER, FOR SOLUTION ORAL at 06:25

## 2023-08-18 RX ADMIN — PANTOPRAZOLE SODIUM 40 MILLIGRAM(S): 20 TABLET, DELAYED RELEASE ORAL at 06:28

## 2023-08-18 RX ADMIN — CLOPIDOGREL BISULFATE 75 MILLIGRAM(S): 75 TABLET, FILM COATED ORAL at 12:59

## 2023-08-18 RX ADMIN — HEPARIN SODIUM 5000 UNIT(S): 5000 INJECTION INTRAVENOUS; SUBCUTANEOUS at 06:27

## 2023-08-18 NOTE — PROGRESS NOTE ADULT - SUBJECTIVE AND OBJECTIVE BOX
Patient is a 78y old  Female who presents with a chief complaint of Acute rehabilitation of gait disorder secondary to multiple intracranial vascular territory infarcts, likely 2/2 thromboembolic event in setting of intracranial vasculopathy and right ankle fracture of lateral malleolus (18 Aug 2023 11:55)      HPI:  Patient is 77 yo female with hx of AMS in the past for UTI who has not seen a physician for over 20 yrs presenting with AMS that started yesterday, and progressively worsening. As per son, patient has been doing ok, yesterday morning, and around noon time, she started to get confused.  Offers no other complaints.  At baseline pt has no cognitive impairment per family. Significant labs/vitals for BP at 213/94 on presentation, WBC 15, nitrite+ on UA.    Pt evaluated by neurology and ICU and found to have NIHSS of 4 due to 2 questions and aphasia on initial presentation on 8/10/23.    CT Head Non-Contrast   - No definite acute intracranial pathology. Moderate sized chronic left occipital infarct. Multiple age-indeterminate lacunar infarcts along the right coronal  radiata.     - Indeterminate confluent hypodensity in the right frontal subcortical white matter without mass effect. Nonspecific and differential can include focus of gliosis, demyelination,    chronic microvascular changes amongst others.     CT Angio Head w/ IV Cont:    - Focal occlusion in the proximal inferior M2 branch of the left MCA with diminutive enhancement in the distal branches.   - Irregular stenosis involving multiple M2 branches of the right MCA and distal right TUCKER.   - Multifocal stenosis in the bilateral posterior cerebral arteries.    MRI reveals Acute infarcts in the left temporal lobe, left parietal lobe, and left insular cortex. Patchy subacute/acute infarct in the right frontal subcortical region superimposed on small chronic lacunar infarcts. Mild chronic microvascular ischemic changes and scattered chronic infarcts.     Pt was seen by orthopedics s/p fall with ankle pain and found to have R lateral malleolus ankle fracture to be conservatively managed with CAM boot and be WBAT. Pt was initially planned for GERARDO but patient/family refused GERARDO and loop recorder placement. MCOT to be set up on day of discharge.     obtained TTE 8/10:    1. Normal global left ventricular systolic function.   2. LV Ejection Fraction by Martinez's Method with a biplane EF of 65 %.   3. Normal left atrial size.   4. There is no evidence of pericardial effusion.   5. Mild mitral valve regurgitation.    The patient was evaluated by the PM&R team once medically stable. The patient was found to have functional limitation in terms of muscle strength, endurance, physical mobility, and ability to carry out activities of daily living (self care, transfers, and ambulation). The patient was started on a course of bedside therapy, She currently requires CG for bed mobility, min assist for transfers and to ambulate with a RW 30 feet in right CAM Boot WBAT, and min assist for UE dessing, mod assist for LE dressing. The pt is motivated and able to start 3 hours of therapy  daily for 6-7 days a week. the patient was deemed to be a good candidate for admission for acute inpatient rehab. The patient was admitted to acute inpatient rehab on 8/17/23.   (17 Aug 2023 10:19)      I examined the patient and reviewed the chart. There have been no significant changes since my history and physical except where documented below.    TODAY'S SUBJECTIVE & REVIEW OF SYMPTOMS: No new complaints. VSS. Labs reviewed. Neuro stable. Full rehab functional eval in progress.    Review of Systems:   Constitutional:    [ x  ] WNL           [   ] poor appetite   [   ] insomnia   [   ] tired   Cardio:                [x   ] WNL           [   ] CP   [   ] SARMIENTO   [   ] palpitations               Resp:                   [ x  ] WNL           [   ] SOB   [   ] cough   [   ] wheezing   GI:                        [   ] WNL           [ x  ] constipation - no BM since admission   [   ] diarrhea   [   ] abdominal pain   [   ] nausea   [   ] emesis                                :                      [   ] WNL           [   ] ZAVALA  [   ] dysuria   [   ] difficulty voiding        [ x ]  new urinary incontinence     Endo:                   [x   ] WNL          [   ] polyuria   [   ] temperature intolerance                 Skin:                     [x   ] WNL          [   ] pain   [   ] wound   [   ] rash   MSK:                    [    ] WNL          [   ] muscle pain   [   x] joint pain/ stiffness right ankle   [   ] muscle tenderness   [   ] swelling   Neuro:                 [   ] WNL per HPI          [   ] HA   [   ] change in vision   [   ] tremor   [   ] weakness   [   ]dysphagia               Physical Exam:     Vital Signs Last 24 Hrs  T(C): 36.7 (18 Aug 2023 12:00), Max: 37.2 (17 Aug 2023 17:45)  T(F): 98.1 (18 Aug 2023 12:00), Max: 98.9 (17 Aug 2023 17:45)  HR: 78 (18 Aug 2023 12:00) (71 - 79)  BP: 177/77 (18 Aug 2023 12:00) (138/86 - 177/77)  BP(mean): 110 (18 Aug 2023 12:00) (109 - 110)  RR: 18 (18 Aug 2023 12:00) (18 - 20)  SpO2: 98% (18 Aug 2023 12:00) (98% - 99%)    General:[x   ] NAD, Resting Comfortable,   [   ] other:                                HEENT: [ x  ] NC/AT, EOMI, PERRL , Normal Conjunctivae,   [   ] other:  Cardio: [   ] RRR, no murmer,   [   ] other:                              Pulm: [ x  ] No Respiratory Distress,  Lungs CTAB,   [   ] other:                       Abdomen: [   ]ND/NT, Soft,   [   ] other:    : [   ] NO ZAVALA CATHETER, [   ] ZAVALA CATHETER- no meatal tear, no discharge, [   ] other:                                            MSK: [   ] No joint swelling, Full ROM,   [ x  ] other:  right lateral ankle swelling and tenderness                                       Ext: [ x  ]No C/C/E, No calf tenderness,   [   ]other:    Skin: [x   ]intact,   [   ] other:                                                                   Neurological Examination:  Cognitive: [    ] AAO x 3,   [ x   ]  other:  oriented to self and place, year with choices                                                              Attention:  [ x   ] intact,   [    ]  other:                            Memory: [    ] intact,    [  x  ]  other:   limited by aphasia  Mood/Affect: [   x ] wnl,    [    ]  other:                                                                             Communication: [    ]Fluent, no dysarthria, following commands:  [  x  ] other: good comprhension, mild to moderate nonfluent expressive impairment  CN II - XII:  [  x  ] intact,  [    ] other:                                                                                        Motor:   RIGHT UE: [x   ] WNL,  [   ] other:  LEFT    UE: [ x  ] WNL,  [   ] other:  RIGHT LE: [x   ] WNL,  [   ] other:   LEFT    LE: [ x  ] WNL,  [   ] other:    Tone: [x    ] wnl,   [    ]  other:  DTRs: [ x  ]symmetric, [   ] other:  Coordination:   [    ] intact,   [  x  ] other:  mild decrease both legs                                                                         Sensory: [   x ] Intact to light touch,   [    ] other: Cognitive:           [   ] WNL           [ x  ]confusion  with recnet UTI, not baseline   Psych:                  [ x  ] WNL           [   ] hallucinations   [   ]agitation   [   ] delusion   [   ]depression        acetaminophen     Tablet .. 650 milliGRAM(s) Oral every 6 hours PRN  amLODIPine   Tablet 10 milliGRAM(s) Oral daily  aspirin enteric coated 81 milliGRAM(s) Oral daily  atorvastatin 80 milliGRAM(s) Oral at bedtime  bisacodyl 10 milliGRAM(s) Oral at bedtime PRN  clopidogrel Tablet 75 milliGRAM(s) Oral daily  dextrose 5% + sodium chloride 0.45%. 1000 milliLiter(s) IV Continuous <Continuous>  heparin   Injectable 5000 Unit(s) SubCutaneous every 12 hours  ondansetron   Disintegrating Tablet 4 milliGRAM(s) Oral every 8 hours PRN  pantoprazole    Tablet 40 milliGRAM(s) Oral before breakfast  polyethylene glycol 3350 17 Gram(s) Oral two times a day  senna 2 Tablet(s) Oral at bedtime      RECENT LABS/IMAGING                        15.1   11.06 )-----------( 231      ( 18 Aug 2023 08:25 )             44.9     08-18    140  |  108  |  29<H>  ----------------------------<  104<H>  3.9   |  21  |  0.9    Ca    9.1      18 Aug 2023 08:25  Mg     2.1     08-18    TPro  6.5  /  Alb  3.8  /  TBili  0.8  /  DBili  x   /  AST  41  /  ALT  54<H>  /  AlkPhos  173<H>  08-18

## 2023-08-18 NOTE — PROGRESS NOTE ADULT - ASSESSMENT
Neuropsychology Follow-up   Session focused on: Family Contact   Primary Contact Name: Last Ny   Relationship: Son     Pertinent Social History:   The patient’s son was contacted to gather relevant social history and collateral report of premorbid functioning. Mrs. Ny was  and reported being  for 46 years. She has two daughters and a son. The patient completed high school and was employed as a  and  for 20 years. The patient lives with her daughter in a private home. Ms. Ny reported that she could perform ADLs and iADLS independently without supervision. The patient’s son denied changes in premorbid cognitive functioning before the injury. However, within the past month, the patient reported repeating stories. The patient denied a psychiatric history. Per report, she stopped smoking cigarettes approximately 25 years ago. Mrs. Ny reported drinking one glass of wine a few times monthly. Mrs. Ny denied a history of other substance use.  wore prescribed glasses and denied wearing hearing aids. The patient’s son hopes the patient will get stronger, eat healthier, and return to baseline functioning before a recent medical event.     Premorbid Functioning:   · ADLs: Independent   · IADLs: Independent   · Psychiatric History/Treatment: Denied   · Cognition: Denied changes   · Substance Use: Denied     Current Status:   Mood Changes: Denied   Cognition Changes: Denied   Behavior Changes: Denied     Patient’s Primary Language: English   a) Changes in understanding primary language after Neurological event. No   b) Preferred language for health care information? English     Brain Injury / Cognitive Behavioral Protocol Education Provided: Not Applicable     Family Education: Patient's son was educated about the rehabilitation process and current status.   Family Concerns: Mobility and mood.   Family Goals: For the patient to return to baseline functioning and increase cognitive skills, improve mood, and strength

## 2023-08-18 NOTE — PROGRESS NOTE ADULT - ASSESSMENT
ASSESSMENT/PLAN  78 year old female with no known medical history (does not see a health care provider for past 20 years) brought in by son to the emergency room for confusion. As per son, patient was noted  during a phone conversation to be confused, and when he got home the confusion was worse. Unsure of last time of known welll. evaluated by neurology and ICU. Pt presented with aphasia. taken to ICU for further neuro monitoring.  labs showed leukocytosis with + nitrite on UA), s/p fall pt with R ankle fracture seen by ortho and managed conservatively with CAM boot.  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       #Rehab of gait disorder secondary to multiple intracranial vascular territory infarcts, likely 2/2 thromboembolic event in setting of intracranial vasculopathy, and right ankle fracture of lateral malleolus  #CVA; Acute Left temporal, L parietal, and L insular  - Continue DAPT 81mg ASA QD, Plavix 75mg QD, high dose statin 80mg atorvastatin 80mg qhs  - maintain SBP goals 140-180.   - continue PT/OT/SLP   - GERARDO planned for 8/14 -- refused by patient and family.   - TTE 8/10 revealed: normal LV function with EF 65%, mild MVR. no PFO or thrombus  -MCOT placed    #Aphasia  - ST/ Neuropsych eval    #Azotemia   - promote oral hydration   - given IVF overnight and BUN improved to 29  - f/u labs    #HTN  - SBP goal per neuro 140-180  - c/w Norvasc 10mg QD  - systolic running 138- 177 8/18.   - monitor and will add low dose Losartan if continues to be elevated  - cont permissive HTN for now    #R lateral malleolus ankle fracture  - WBAT in CAM boot  - conservative non-surgical management  - Pain control tylenol 650mg q6hr PRN  - followup with orthopedics, Dr. Enoc Perez in 2 weeks orthopedics.     #s/p E.coli UTI  - Pt started on CTX for abx treatment    #Misc  DVT ppx: SQH  Zofran for nausea  GI/bowel ppx: protonix, senna qhs  Diet: DASH/TLC diet  Code status: Full Code  -Skin: No active issues at this time    Precautions / PROPHYLAXIS:      - Falls, safety    - Ortho: Weight bearing status: WABT with CAM boot of RLE

## 2023-08-19 RX ADMIN — HEPARIN SODIUM 5000 UNIT(S): 5000 INJECTION INTRAVENOUS; SUBCUTANEOUS at 17:39

## 2023-08-19 RX ADMIN — PANTOPRAZOLE SODIUM 40 MILLIGRAM(S): 20 TABLET, DELAYED RELEASE ORAL at 05:52

## 2023-08-19 RX ADMIN — SODIUM CHLORIDE 75 MILLILITER(S): 9 INJECTION, SOLUTION INTRAVENOUS at 00:15

## 2023-08-19 RX ADMIN — ATORVASTATIN CALCIUM 80 MILLIGRAM(S): 80 TABLET, FILM COATED ORAL at 21:15

## 2023-08-19 RX ADMIN — CHLORHEXIDINE GLUCONATE 1 APPLICATION(S): 213 SOLUTION TOPICAL at 05:51

## 2023-08-19 RX ADMIN — CLOPIDOGREL BISULFATE 75 MILLIGRAM(S): 75 TABLET, FILM COATED ORAL at 12:21

## 2023-08-19 RX ADMIN — Medication 81 MILLIGRAM(S): at 12:20

## 2023-08-19 RX ADMIN — HEPARIN SODIUM 5000 UNIT(S): 5000 INJECTION INTRAVENOUS; SUBCUTANEOUS at 05:51

## 2023-08-19 RX ADMIN — AMLODIPINE BESYLATE 10 MILLIGRAM(S): 2.5 TABLET ORAL at 05:52

## 2023-08-19 NOTE — PROGRESS NOTE ADULT - SUBJECTIVE AND OBJECTIVE BOX
Patient is a 78y old  Female who presents with a chief complaint of Acute rehabilitation of gait disorder secondary to multiple intracranial vascular territory infarcts, likely 2/2 thromboembolic event in setting of intracranial vasculopathy and right ankle fracture of lateral malleolus (18 Aug 2023 11:55)      HPI:  Patient is 77 yo female with hx of AMS in the past for UTI who has not seen a physician for over 20 yrs presenting with AMS that started yesterday, and progressively worsening. As per son, patient has been doing ok, yesterday morning, and around noon time, she started to get confused.  Offers no other complaints.  At baseline pt has no cognitive impairment per family. Significant labs/vitals for BP at 213/94 on presentation, WBC 15, nitrite+ on UA.    Pt evaluated by neurology and ICU and found to have NIHSS of 4 due to 2 questions and aphasia on initial presentation on 8/10/23.    CT Head Non-Contrast   - No definite acute intracranial pathology. Moderate sized chronic left occipital infarct. Multiple age-indeterminate lacunar infarcts along the right coronal  radiata.     - Indeterminate confluent hypodensity in the right frontal subcortical white matter without mass effect. Nonspecific and differential can include focus of gliosis, demyelination,    chronic microvascular changes amongst others.     CT Angio Head w/ IV Cont:    - Focal occlusion in the proximal inferior M2 branch of the left MCA with diminutive enhancement in the distal branches.   - Irregular stenosis involving multiple M2 branches of the right MCA and distal right TUCKER.   - Multifocal stenosis in the bilateral posterior cerebral arteries.    MRI reveals Acute infarcts in the left temporal lobe, left parietal lobe, and left insular cortex. Patchy subacute/acute infarct in the right frontal subcortical region superimposed on small chronic lacunar infarcts. Mild chronic microvascular ischemic changes and scattered chronic infarcts.     Pt was seen by orthopedics s/p fall with ankle pain and found to have R lateral malleolus ankle fracture to be conservatively managed with CAM boot and be WBAT. Pt was initially planned for GERARDO but patient/family refused GERARDO and loop recorder placement. MCOT to be set up on day of discharge.     obtained TTE 8/10:    1. Normal global left ventricular systolic function.   2. LV Ejection Fraction by Martinez's Method with a biplane EF of 65 %.   3. Normal left atrial size.   4. There is no evidence of pericardial effusion.   5. Mild mitral valve regurgitation.    The patient was evaluated by the PM&R team once medically stable. The patient was found to have functional limitation in terms of muscle strength, endurance, physical mobility, and ability to carry out activities of daily living (self care, transfers, and ambulation). The patient was started on a course of bedside therapy, She currently requires CG for bed mobility, min assist for transfers and to ambulate with a RW 30 feet in right CAM Boot WBAT, and min assist for UE dessing, mod assist for LE dressing. The pt is motivated and able to start 3 hours of therapy  daily for 6-7 days a week. the patient was deemed to be a good candidate for admission for acute inpatient rehab. The patient was admitted to acute inpatient rehab on 8/17/23.   (17 Aug 2023 10:19)      I examined the patient and reviewed the chart. There have been no significant changes since my history and physical except where documented below.    TODAY'S SUBJECTIVE & REVIEW OF SYMPTOMS:   No new complaints. No overnight events. pt seen and examined bedside.   VSS. will monitor for HTN and consideration to start losartan   Neuro stable.     Review of Systems:   Constitutional:    [ x  ] WNL           [   ] poor appetite   [   ] insomnia   [   ] tired   Cardio:                [x   ] WNL           [   ] CP   [   ] SARMIENTO   [   ] palpitations               Resp:                   [ x  ] WNL           [   ] SOB   [   ] cough   [   ] wheezing   GI:                        [   ] WNL           [ x  ] constipation - no BM since admission   [   ] diarrhea   [   ] abdominal pain   [   ] nausea   [   ] emesis                                :                      [   ] WNL           [   ] ZAVALA  [   ] dysuria   [   ] difficulty voiding        [ x ]  new urinary incontinence     Endo:                   [x   ] WNL          [   ] polyuria   [   ] temperature intolerance                 Skin:                     [x   ] WNL          [   ] pain   [   ] wound   [   ] rash   MSK:                    [    ] WNL          [   ] muscle pain   [   x] joint pain/ stiffness right ankle   [   ] muscle tenderness   [   ] swelling   Neuro:                 [   ] WNL per HPI          [   ] HA   [   ] change in vision   [   ] tremor   [   ] weakness   [   ]dysphagia               Physical Exam:     Vital Signs (24 Hrs):  T(C): 36 (08-19-23 @ 05:45), Max: 36.9 (08-18-23 @ 19:37)  HR: 66 (08-19-23 @ 05:45) (66 - 80)  BP: 139/62 (08-19-23 @ 05:45) (139/62 - 177/77)  RR: 18 (08-19-23 @ 05:45) (18 - 18)  SpO2: 98% (08-18-23 @ 12:00) (98% - 98%)  Wt(kg): --  Daily     Daily     I&O's Summary    18 Aug 2023 07:01  -  19 Aug 2023 07:00  --------------------------------------------------------  IN: 1040 mL / OUT: 0 mL / NET: 1040 mL      General:[x   ] NAD, Resting Comfortable,   [   ] other:                                HEENT: [ x  ] NC/AT, EOMI, PERRL , Normal Conjunctivae,   [   ] other:  Cardio: [   ] RRR, no murmer,   [   ] other:                              Pulm: [ x  ] No Respiratory Distress,  Lungs CTAB,   [   ] other:                       Abdomen: [   ]ND/NT, Soft,   [   ] other:    : [   ] NO ZAVALA CATHETER, [   ] ZAVALA CATHETER- no meatal tear, no discharge, [   ] other:                                            MSK: [   ] No joint swelling, Full ROM,   [ x  ] other:  right lateral ankle swelling and tenderness                                       Ext: [ x  ]No C/C/E, No calf tenderness,   [   ]other:    Skin: [x   ]intact,   [   ] other:                                                                   Neurological Examination:  Cognitive: [    ] AAO x 3,   [ x   ]  other:  oriented to self and place, year with choices                                                              Attention:  [ x   ] intact,   [    ]  other:                            Memory: [    ] intact,    [  x  ]  other:   limited by aphasia  Mood/Affect: [   x ] wnl,    [    ]  other:                                                                             Communication: [    ]Fluent, no dysarthria, following commands:  [  x  ] other: good comprhension, mild to moderate nonfluent expressive impairment  CN II - XII:  [  x  ] intact,  [    ] other:                                                                                        Motor:   RIGHT UE: [x   ] WNL,  [   ] other:  LEFT    UE: [ x  ] WNL,  [   ] other:  RIGHT LE: [x   ] WNL,  [   ] other:   LEFT    LE: [ x  ] WNL,  [   ] other:    Tone: [x    ] wnl,   [    ]  other:  DTRs: [ x  ]symmetric, [   ] other:  Coordination:   [    ] intact,   [  x  ] other:  mild decrease both legs                                                                         Sensory: [   x ] Intact to light touch,   [    ] other: Cognitive:           [   ] WNL           [ x  ]confusion  with recnet UTI, not baseline   Psych:                  [ x  ] WNL           [   ] hallucinations   [   ]agitation   [   ] delusion   [   ]depression      MEDICATIONS  (STANDING):  amLODIPine   Tablet 10 milliGRAM(s) Oral daily  aspirin enteric coated 81 milliGRAM(s) Oral daily  atorvastatin 80 milliGRAM(s) Oral at bedtime  chlorhexidine 2% Cloths 1 Application(s) Topical <User Schedule>  clopidogrel Tablet 75 milliGRAM(s) Oral daily  heparin   Injectable 5000 Unit(s) SubCutaneous every 12 hours  pantoprazole    Tablet 40 milliGRAM(s) Oral before breakfast  polyethylene glycol 3350 17 Gram(s) Oral two times a day  senna 2 Tablet(s) Oral at bedtime    MEDICATIONS  (PRN):  acetaminophen     Tablet .. 650 milliGRAM(s) Oral every 6 hours PRN Temp greater or equal to 38C (100.4F), Mild Pain (1 - 3)  bisacodyl 10 milliGRAM(s) Oral at bedtime PRN Constipation  ondansetron   Disintegrating Tablet 4 milliGRAM(s) Oral every 8 hours PRN Nausea and/or Vomiting    RECENT LABS/IMAGING                        15.1   11.06 )-----------( 231      ( 18 Aug 2023 08:25 )             44.9     08-18    140  |  108  |  29<H>  ----------------------------<  104<H>  3.9   |  21  |  0.9    Ca    9.1      18 Aug 2023 08:25  Mg     2.1     08-18    TPro  6.5  /  Alb  3.8  /  TBili  0.8  /  DBili  x   /  AST  41  /  ALT  54<H>  /  AlkPhos  173<H>  08-18

## 2023-08-20 RX ADMIN — Medication 81 MILLIGRAM(S): at 12:21

## 2023-08-20 RX ADMIN — CLOPIDOGREL BISULFATE 75 MILLIGRAM(S): 75 TABLET, FILM COATED ORAL at 12:21

## 2023-08-20 RX ADMIN — AMLODIPINE BESYLATE 10 MILLIGRAM(S): 2.5 TABLET ORAL at 06:07

## 2023-08-20 RX ADMIN — HEPARIN SODIUM 5000 UNIT(S): 5000 INJECTION INTRAVENOUS; SUBCUTANEOUS at 17:27

## 2023-08-20 RX ADMIN — CHLORHEXIDINE GLUCONATE 1 APPLICATION(S): 213 SOLUTION TOPICAL at 06:07

## 2023-08-20 RX ADMIN — ATORVASTATIN CALCIUM 80 MILLIGRAM(S): 80 TABLET, FILM COATED ORAL at 21:31

## 2023-08-20 RX ADMIN — PANTOPRAZOLE SODIUM 40 MILLIGRAM(S): 20 TABLET, DELAYED RELEASE ORAL at 06:07

## 2023-08-20 RX ADMIN — HEPARIN SODIUM 5000 UNIT(S): 5000 INJECTION INTRAVENOUS; SUBCUTANEOUS at 06:07

## 2023-08-20 NOTE — PROGRESS NOTE ADULT - ASSESSMENT
ASSESSMENT/PLAN  78 year old female with no known medical history (does not see a health care provider for past 20 years) brought in by son to the emergency room for confusion. As per son, patient was noted  during a phone conversation to be confused, and when he got home the confusion was worse. Unsure of last time of known welll. evaluated by neurology and ICU. Pt presented with aphasia. taken to ICU for further neuro monitoring.  labs showed leukocytosis with + nitrite on UA), s/p fall pt with R ankle fracture seen by ortho and managed conservatively with CAM boot.  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       #Rehab of gait disorder secondary to multiple intracranial vascular territory infarcts, likely 2/2 thromboembolic event in setting of intracranial vasculopathy, and right ankle fracture of lateral malleolus  #CVA; Acute Left temporal, L parietal, and L insular  - Continue DAPT 81mg ASA QD, Plavix 75mg QD, high dose statin 80mg atorvastatin 80mg qhs  - maintain SBP goals 140-180.   - continue PT/OT/SLP   - GERARDO planned for 8/14 -- refused by patient and family.   - TTE 8/10 revealed: normal LV function with EF 65%, mild MVR. no PFO or thrombus  - MCOT placed (8/17/2023)    #Aphasia  - ST/ Neuropsych eval    #Azotemia   - promote oral hydration   - given IVF overnight and BUN improved to 29  - f/u labs    #HTN  - SBP goal per neuro 140-180  - c/w Norvasc 10mg QD  - systolic running 138- 177 8/18.   - monitor and will add low dose Losartan if continues to be elevated  - cont permissive HTN for now    #R lateral malleolus ankle fracture  - WBAT in CAM boot  - conservative non-surgical management  - Pain control tylenol 650mg q6hr PRN  - followup with orthopedics, Dr. Enoc Perez in 2 weeks orthopedics.     #s/p E.coli UTI  - Pt started on CTX for abx treatment    #Misc  DVT ppx: SQH  Zofran for nausea  GI/bowel ppx: protonix, senna qhs  Diet: DASH/TLC diet  Code status: Full Code  -Skin: No active issues at this time    Precautions / PROPHYLAXIS:      - Falls, safety    - Ortho: Weight bearing status: WABT with CAM boot of RLE

## 2023-08-20 NOTE — PROGRESS NOTE ADULT - SUBJECTIVE AND OBJECTIVE BOX
Patient is a 78y old  Female who presents with a chief complaint of Acute rehabilitation of gait disorder secondary to multiple intracranial vascular territory infarcts, likely 2/2 thromboembolic event in setting of intracranial vasculopathy and right ankle fracture of lateral malleolus (18 Aug 2023 11:55)      HPI:  Patient is 79 yo female with hx of AMS in the past for UTI who has not seen a physician for over 20 yrs presenting with AMS that started yesterday, and progressively worsening. As per son, patient has been doing ok, yesterday morning, and around noon time, she started to get confused.  Offers no other complaints.  At baseline pt has no cognitive impairment per family. Significant labs/vitals for BP at 213/94 on presentation, WBC 15, nitrite+ on UA.    Pt evaluated by neurology and ICU and found to have NIHSS of 4 due to 2 questions and aphasia on initial presentation on 8/10/23.    CT Head Non-Contrast   - No definite acute intracranial pathology. Moderate sized chronic left occipital infarct. Multiple age-indeterminate lacunar infarcts along the right coronal  radiata.     - Indeterminate confluent hypodensity in the right frontal subcortical white matter without mass effect. Nonspecific and differential can include focus of gliosis, demyelination,    chronic microvascular changes amongst others.     CT Angio Head w/ IV Cont:    - Focal occlusion in the proximal inferior M2 branch of the left MCA with diminutive enhancement in the distal branches.   - Irregular stenosis involving multiple M2 branches of the right MCA and distal right TUCKER.   - Multifocal stenosis in the bilateral posterior cerebral arteries.    MRI reveals Acute infarcts in the left temporal lobe, left parietal lobe, and left insular cortex. Patchy subacute/acute infarct in the right frontal subcortical region superimposed on small chronic lacunar infarcts. Mild chronic microvascular ischemic changes and scattered chronic infarcts.     Pt was seen by orthopedics s/p fall with ankle pain and found to have R lateral malleolus ankle fracture to be conservatively managed with CAM boot and be WBAT. Pt was initially planned for GERARDO but patient/family refused GERARDO and loop recorder placement. MCOT set up on 8/17/2023.    obtained TTE 8/10:    1. Normal global left ventricular systolic function.   2. LV Ejection Fraction by Martinez's Method with a biplane EF of 65 %.   3. Normal left atrial size.   4. There is no evidence of pericardial effusion.   5. Mild mitral valve regurgitation.    The patient was evaluated by the PM&R team once medically stable. The patient was found to have functional limitation in terms of muscle strength, endurance, physical mobility, and ability to carry out activities of daily living (self care, transfers, and ambulation). The patient was started on a course of bedside therapy, She currently requires CG for bed mobility, min assist for transfers and to ambulate with a RW 30 feet in right CAM Boot WBAT, and min assist for UE dessing, mod assist for LE dressing. The pt is motivated and able to start 3 hours of therapy  daily for 6-7 days a week. the patient was deemed to be a good candidate for admission for acute inpatient rehab. The patient was admitted to acute inpatient rehab on 8/17/23.   (17 Aug 2023 10:19)      I examined the patient and reviewed the chart. There have been no significant changes since my history and physical except where documented below.    TODAY'S SUBJECTIVE & REVIEW OF SYMPTOMS:   Pt seen and examined at bedside.  No overnight events.  No new complaints.  Voiding urine/stool spontaneously.  VS reviewed.  Pt has MCOT since 8/17/2023.    Review of Systems:   Constitutional:    [ x  ] WNL           [   ] poor appetite   [   ] insomnia   [   ] tired   Cardio:                [x   ] WNL           [   ] CP   [   ] SARMIENTO   [   ] palpitations               Resp:                   [ x  ] WNL           [   ] SOB   [   ] cough   [   ] wheezing   GI:                        [   ] WNL           [ x  ] constipation - no BM since admission   [   ] diarrhea   [   ] abdominal pain   [   ] nausea   [   ] emesis                                :                      [   ] WNL           [   ] ZAVALA  [   ] dysuria   [   ] difficulty voiding        [ x ]  new urinary incontinence     Endo:                   [x   ] WNL          [   ] polyuria   [   ] temperature intolerance                 Skin:                     [x   ] WNL          [   ] pain   [   ] wound   [   ] rash   MSK:                    [    ] WNL          [   ] muscle pain   [   x] joint pain/ stiffness right ankle   [   ] muscle tenderness   [   ] swelling   Neuro:                 [   ] WNL per HPI          [   ] HA   [   ] change in vision   [   ] tremor   [   ] weakness   [   ]dysphagia               Physical Exam:     Vital Signs (24 Hrs):  T(C): 36.6 (08-20-23 @ 05:40), Max: 36.8 (08-19-23 @ 21:18)  HR: 68 (08-20-23 @ 05:40) (68 - 74)  BP: 141/65 (08-20-23 @ 05:40) (133/62 - 157/68)  RR: 20 (08-20-23 @ 05:40) (18 - 20)  SpO2: --  Wt(kg): --  Daily     Daily     I&O's Summary      General:[x   ] NAD, Resting Comfortable,   [   ] other:                                HEENT: [ x  ] NC/AT, EOMI, PERRL , Normal Conjunctivae,   [   ] other:  Cardio: [   ] RRR, no murmer,   [   ] other:                              Pulm: [ x  ] No Respiratory Distress,  Lungs CTAB,   [   ] other:                       Abdomen: [   ]ND/NT, Soft,   [   ] other:    : [   ] NO ZAVALA CATHETER, [   ] ZAVALA CATHETER- no meatal tear, no discharge, [   ] other:                                            MSK: [   ] No joint swelling, Full ROM,   [ x  ] other:  right lateral ankle swelling and tenderness                                       Ext: [ x  ]No C/C/E, No calf tenderness,   [   ]other:    Skin: [x   ]intact,   [   ] other:                                                                   Neurological Examination:  Cognitive: [    ] AAO x 3,   [ x   ]  other:  oriented to self and place, year with choices                                                              Attention:  [ x   ] intact,   [    ]  other:                            Memory: [    ] intact,    [  x  ]  other:   limited by aphasia  Mood/Affect: [   x ] wnl,    [    ]  other:                                                                             Communication: [    ]Fluent, no dysarthria, following commands:  [  x  ] other: good comprhension, mild to moderate nonfluent expressive impairment  CN II - XII:  [  x  ] intact,  [    ] other:                                                                                        Motor:   RIGHT UE: [x   ] WNL,  [   ] other:  LEFT    UE: [ x  ] WNL,  [   ] other:  RIGHT LE: [x   ] WNL,  [   ] other:   LEFT    LE: [ x  ] WNL,  [   ] other:    Tone: [x    ] wnl,   [    ]  other:  DTRs: [ x  ]symmetric, [   ] other:  Coordination:   [    ] intact,   [  x  ] other:  mild decrease both legs                                                                         Sensory: [   x ] Intact to light touch,   [    ] other: Cognitive:           [   ] WNL           [ x  ]confusion  with recnet UTI, not baseline   Psych:                  [ x  ] WNL           [   ] hallucinations   [   ]agitation   [   ] delusion   [   ]depression      MEDICATIONS  (STANDING):  amLODIPine   Tablet 10 milliGRAM(s) Oral daily  aspirin enteric coated 81 milliGRAM(s) Oral daily  atorvastatin 80 milliGRAM(s) Oral at bedtime  chlorhexidine 2% Cloths 1 Application(s) Topical <User Schedule>  clopidogrel Tablet 75 milliGRAM(s) Oral daily  heparin   Injectable 5000 Unit(s) SubCutaneous every 12 hours  pantoprazole    Tablet 40 milliGRAM(s) Oral before breakfast  polyethylene glycol 3350 17 Gram(s) Oral two times a day  senna 2 Tablet(s) Oral at bedtime    MEDICATIONS  (PRN):  acetaminophen     Tablet .. 650 milliGRAM(s) Oral every 6 hours PRN Temp greater or equal to 38C (100.4F), Mild Pain (1 - 3)  bisacodyl 10 milliGRAM(s) Oral at bedtime PRN Constipation  ondansetron   Disintegrating Tablet 4 milliGRAM(s) Oral every 8 hours PRN Nausea and/or Vomiting    RECENT LABS/IMAGING                        15.1   11.06 )-----------( 231      ( 18 Aug 2023 08:25 )             44.9     08-18    140  |  108  |  29<H>  ----------------------------<  104<H>  3.9   |  21  |  0.9    Ca    9.1      18 Aug 2023 08:25  Mg     2.1     08-18    TPro  6.5  /  Alb  3.8  /  TBili  0.8  /  DBili  x   /  AST  41  /  ALT  54<H>  /  AlkPhos  173<H>  08-18

## 2023-08-21 LAB
ALBUMIN SERPL ELPH-MCNC: 3.7 G/DL — SIGNIFICANT CHANGE UP (ref 3.5–5.2)
ALP SERPL-CCNC: 191 U/L — HIGH (ref 30–115)
ALT FLD-CCNC: 47 U/L — HIGH (ref 0–41)
ANION GAP SERPL CALC-SCNC: 11 MMOL/L — SIGNIFICANT CHANGE UP (ref 7–14)
AST SERPL-CCNC: 32 U/L — SIGNIFICANT CHANGE UP (ref 0–41)
BASOPHILS # BLD AUTO: 0.05 K/UL — SIGNIFICANT CHANGE UP (ref 0–0.2)
BASOPHILS NFR BLD AUTO: 0.5 % — SIGNIFICANT CHANGE UP (ref 0–1)
BILIRUB SERPL-MCNC: 0.7 MG/DL — SIGNIFICANT CHANGE UP (ref 0.2–1.2)
BUN SERPL-MCNC: 30 MG/DL — HIGH (ref 10–20)
CALCIUM SERPL-MCNC: 9.1 MG/DL — SIGNIFICANT CHANGE UP (ref 8.4–10.5)
CHLORIDE SERPL-SCNC: 109 MMOL/L — SIGNIFICANT CHANGE UP (ref 98–110)
CO2 SERPL-SCNC: 22 MMOL/L — SIGNIFICANT CHANGE UP (ref 17–32)
CREAT SERPL-MCNC: 1 MG/DL — SIGNIFICANT CHANGE UP (ref 0.7–1.5)
EGFR: 58 ML/MIN/1.73M2 — LOW
EOSINOPHIL # BLD AUTO: 0.28 K/UL — SIGNIFICANT CHANGE UP (ref 0–0.7)
EOSINOPHIL NFR BLD AUTO: 2.9 % — SIGNIFICANT CHANGE UP (ref 0–8)
GLUCOSE SERPL-MCNC: 93 MG/DL — SIGNIFICANT CHANGE UP (ref 70–99)
HCT VFR BLD CALC: 41.4 % — SIGNIFICANT CHANGE UP (ref 37–47)
HGB BLD-MCNC: 14 G/DL — SIGNIFICANT CHANGE UP (ref 12–16)
IMM GRANULOCYTES NFR BLD AUTO: 0.3 % — SIGNIFICANT CHANGE UP (ref 0.1–0.3)
LYMPHOCYTES # BLD AUTO: 1.57 K/UL — SIGNIFICANT CHANGE UP (ref 1.2–3.4)
LYMPHOCYTES # BLD AUTO: 16.1 % — LOW (ref 20.5–51.1)
MAGNESIUM SERPL-MCNC: 2.2 MG/DL — SIGNIFICANT CHANGE UP (ref 1.8–2.4)
MCHC RBC-ENTMCNC: 30 PG — SIGNIFICANT CHANGE UP (ref 27–31)
MCHC RBC-ENTMCNC: 33.8 G/DL — SIGNIFICANT CHANGE UP (ref 32–37)
MCV RBC AUTO: 88.8 FL — SIGNIFICANT CHANGE UP (ref 81–99)
MONOCYTES # BLD AUTO: 0.91 K/UL — HIGH (ref 0.1–0.6)
MONOCYTES NFR BLD AUTO: 9.4 % — HIGH (ref 1.7–9.3)
NEUTROPHILS # BLD AUTO: 6.89 K/UL — HIGH (ref 1.4–6.5)
NEUTROPHILS NFR BLD AUTO: 70.8 % — SIGNIFICANT CHANGE UP (ref 42.2–75.2)
NRBC # BLD: 0 /100 WBCS — SIGNIFICANT CHANGE UP (ref 0–0)
PLATELET # BLD AUTO: 253 K/UL — SIGNIFICANT CHANGE UP (ref 130–400)
PMV BLD: 10.9 FL — HIGH (ref 7.4–10.4)
POTASSIUM SERPL-MCNC: 4.3 MMOL/L — SIGNIFICANT CHANGE UP (ref 3.5–5)
POTASSIUM SERPL-SCNC: 4.3 MMOL/L — SIGNIFICANT CHANGE UP (ref 3.5–5)
PROT SERPL-MCNC: 6.2 G/DL — SIGNIFICANT CHANGE UP (ref 6–8)
RBC # BLD: 4.66 M/UL — SIGNIFICANT CHANGE UP (ref 4.2–5.4)
RBC # FLD: 13.4 % — SIGNIFICANT CHANGE UP (ref 11.5–14.5)
SODIUM SERPL-SCNC: 142 MMOL/L — SIGNIFICANT CHANGE UP (ref 135–146)
WBC # BLD: 9.73 K/UL — SIGNIFICANT CHANGE UP (ref 4.8–10.8)
WBC # FLD AUTO: 9.73 K/UL — SIGNIFICANT CHANGE UP (ref 4.8–10.8)

## 2023-08-21 RX ADMIN — CHLORHEXIDINE GLUCONATE 1 APPLICATION(S): 213 SOLUTION TOPICAL at 06:13

## 2023-08-21 RX ADMIN — AMLODIPINE BESYLATE 10 MILLIGRAM(S): 2.5 TABLET ORAL at 06:12

## 2023-08-21 RX ADMIN — Medication 81 MILLIGRAM(S): at 12:09

## 2023-08-21 RX ADMIN — ATORVASTATIN CALCIUM 80 MILLIGRAM(S): 80 TABLET, FILM COATED ORAL at 21:30

## 2023-08-21 RX ADMIN — CLOPIDOGREL BISULFATE 75 MILLIGRAM(S): 75 TABLET, FILM COATED ORAL at 12:09

## 2023-08-21 RX ADMIN — HEPARIN SODIUM 5000 UNIT(S): 5000 INJECTION INTRAVENOUS; SUBCUTANEOUS at 17:40

## 2023-08-21 RX ADMIN — HEPARIN SODIUM 5000 UNIT(S): 5000 INJECTION INTRAVENOUS; SUBCUTANEOUS at 06:13

## 2023-08-21 RX ADMIN — PANTOPRAZOLE SODIUM 40 MILLIGRAM(S): 20 TABLET, DELAYED RELEASE ORAL at 06:12

## 2023-08-21 NOTE — PROGRESS NOTE ADULT - ASSESSMENT
ASSESSMENT/PLAN  78 year old female with no known medical history (does not see a health care provider for past 20 years) brought in by son to the emergency room for confusion. As per son, patient was noted  during a phone conversation to be confused, and when he got home the confusion was worse. Unsure of last time of known welll. evaluated by neurology and ICU. Pt presented with aphasia. taken to ICU for further neuro monitoring.  labs showed leukocytosis with + nitrite on UA), s/p fall pt with R ankle fracture seen by ortho and managed conservatively with CAM boot.  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       #Rehab of gait disorder secondary to multiple intracranial vascular territory infarcts, likely 2/2 thromboembolic event in setting of intracranial vasculopathy, and right ankle fracture of lateral malleolus  #CVA; Acute Left temporal, L parietal, and L insular  - Continue DAPT 81mg ASA QD, Plavix 75mg QD, high dose statin 80mg atorvastatin 80mg qhs  - maintain SBP goals 140-180.   - continue PT/OT/SLP   - GERARDO planned for 8/14 -- refused by patient and family.   - TTE 8/10 revealed: normal LV function with EF 65%, mild MVR. no PFO or thrombus  - MCOT placed (8/17/2023)    #Aphasia  - ST/ Neuropsych eval  - improving    #Azotemia   - promote oral hydration   - given IVF overnight and BUN improved to 29  - appears to be chronic, stable    #HTN  - SBP goal per neuro 140-180  - c/w Norvasc 10mg QD  - 130- 150  - stable    #R lateral malleolus ankle fracture  - WBAT in CAM boot  - conservative non-surgical management  - Pain control tylenol 650mg q6hr PRN  - followup with orthopedics, Dr. Enoc Perez in 2 weeks orthopedics.     #s/p E.coli UTI  - completed antibiotic    #Misc  DVT ppx: SQH  Zofran for nausea  GI/bowel ppx: protonix, senna qhs  Diet: DASH/TLC diet  Code status: Full Code  -Skin: No active issues at this time    Precautions / PROPHYLAXIS:      - Falls, safety    - Ortho: Weight bearing status: WABT with CAM boot of RLE

## 2023-08-21 NOTE — PROGRESS NOTE ADULT - SUBJECTIVE AND OBJECTIVE BOX
Patient is a 78y old  Female who presents with a chief complaint of Acute rehabilitation of gait disorder secondary to multiple intracranial vascular territory infarcts, likely 2/2 thromboembolic event in setting of intracranial vasculopathy and right ankle fracture of lateral malleolus (18 Aug 2023 11:55)      HPI:  Patient is 77 yo female with hx of AMS in the past for UTI who has not seen a physician for over 20 yrs presenting with AMS that started yesterday, and progressively worsening. As per son, patient has been doing ok, yesterday morning, and around noon time, she started to get confused.  Offers no other complaints.  At baseline pt has no cognitive impairment per family. Significant labs/vitals for BP at 213/94 on presentation, WBC 15, nitrite+ on UA.    Pt evaluated by neurology and ICU and found to have NIHSS of 4 due to 2 questions and aphasia on initial presentation on 8/10/23.    CT Head Non-Contrast   - No definite acute intracranial pathology. Moderate sized chronic left occipital infarct. Multiple age-indeterminate lacunar infarcts along the right coronal  radiata.     - Indeterminate confluent hypodensity in the right frontal subcortical white matter without mass effect. Nonspecific and differential can include focus of gliosis, demyelination,    chronic microvascular changes amongst others.     CT Angio Head w/ IV Cont:    - Focal occlusion in the proximal inferior M2 branch of the left MCA with diminutive enhancement in the distal branches.   - Irregular stenosis involving multiple M2 branches of the right MCA and distal right TUCKER.   - Multifocal stenosis in the bilateral posterior cerebral arteries.    MRI reveals Acute infarcts in the left temporal lobe, left parietal lobe, and left insular cortex. Patchy subacute/acute infarct in the right frontal subcortical region superimposed on small chronic lacunar infarcts. Mild chronic microvascular ischemic changes and scattered chronic infarcts.     Pt was seen by orthopedics s/p fall with ankle pain and found to have R lateral malleolus ankle fracture to be conservatively managed with CAM boot and be WBAT. Pt was initially planned for GERARDO but patient/family refused GERARDO and loop recorder placement. MCOT set up on 8/17/2023.    obtained TTE 8/10:    1. Normal global left ventricular systolic function.   2. LV Ejection Fraction by Martinez's Method with a biplane EF of 65 %.   3. Normal left atrial size.   4. There is no evidence of pericardial effusion.   5. Mild mitral valve regurgitation.    The patient was evaluated by the PM&R team once medically stable. The patient was found to have functional limitation in terms of muscle strength, endurance, physical mobility, and ability to carry out activities of daily living (self care, transfers, and ambulation). The patient was started on a course of bedside therapy, She currently requires CG for bed mobility, min assist for transfers and to ambulate with a RW 30 feet in right CAM Boot WBAT, and min assist for UE dessing, mod assist for LE dressing. The pt is motivated and able to start 3 hours of therapy  daily for 6-7 days a week. the patient was deemed to be a good candidate for admission for acute inpatient rehab. The patient was admitted to acute inpatient rehab on 8/17/23.   (17 Aug 2023 10:19)      TODAY'S SUBJECTIVE & REVIEW OF SYMPTOMS:   Pt seen and examined at bedside.  No overnight events.  No new complaints.  Voiding urine/stool spontaneously.  VS reviewed.  Pt has MCOT since 8/17/2023.    Review of Systems:   Constitutional:    [ x  ] WNL           [   ] poor appetite   [   ] insomnia   [   ] tired   Cardio:                [x   ] WNL           [   ] CP   [   ] SARMIENTO   [   ] palpitations               Resp:                   [ x  ] WNL           [   ] SOB   [   ] cough   [   ] wheezing   GI:                        [ x  ] WNL           [   ] constipation  [   ] diarrhea   [   ] abdominal pain   [   ] nausea   [   ] emesis                                :                      [   ] WNL           [   ] ZAVALA  [   ] dysuria   [   ] difficulty voiding        [ x ]  new urinary incontinence     Endo:                   [x   ] WNL          [   ] polyuria   [   ] temperature intolerance                 Skin:                     [x   ] WNL          [   ] pain   [   ] wound   [   ] rash   MSK:                    [    ] WNL          [   ] muscle pain   [   x] joint pain/ stiffness right ankle   [   ] muscle tenderness   [   ] swelling   Neuro:                 [   ] WNL per HPI          [   ] HA   [   ] change in vision   [   ] tremor   [   ] weakness   [   ]dysphagia               Physical Exam:     Vital Signs Last 24 Hrs  T(C): 36.4 (21 Aug 2023 13:07), Max: 36.5 (21 Aug 2023 05:00)  T(F): 97.6 (21 Aug 2023 13:07), Max: 97.7 (21 Aug 2023 05:00)  HR: 64 (21 Aug 2023 13:07) (64 - 74)  BP: 151/67 (21 Aug 2023 13:07) (139/63 - 151/67)  BP(mean): --  RR: 18 (21 Aug 2023 13:07) (18 - 18)  SpO2: --      General:[x   ] NAD, Resting Comfortable,   [   ] other:                                HEENT: [ x  ] NC/AT, EOMI, PERRL , Normal Conjunctivae,   [   ] other:  Cardio: [ x  ] RRR, no murmer,   [   ] other:                              Pulm: [ x  ] No Respiratory Distress,  Lungs CTAB,   [   ] other:                       Abdomen: [   ]ND/NT, Soft,   [   ] other:    : [ x  ] NO ZAVALA CATHETER, [   ] ZAVALA CATHETER- no meatal tear, no discharge, [   ] other:                                            MSK: [   ] No joint swelling, Full ROM,   [ x  ] other:  right lateral ankle swelling and tenderness                                       Ext: [ x  ]No C/C/E, No calf tenderness,   [   ]other:    Skin: [x   ]intact,   [   ] other:                                                                   Neurological Examination:  Cognitive: [    ] AAO x 3,   [ x   ]  other:  oriented to self and place, year with choices                                                              Attention:  [ x   ] intact,   [    ]  other:                            Memory: [    ] intact,    [  x  ]  other:   limited by aphasia  Mood/Affect: [   x ] wnl,    [    ]  other:                                                                             Communication: [    ]Fluent, no dysarthria, following commands:  [  x  ] other: good comprehension, mild to moderate nonfluent expressive impairment - improving  CN II - XII:  [  x  ] intact,  [    ] other:                                                                                        Motor:   RIGHT UE: [x   ] WNL,  [   ] other:  LEFT    UE: [ x  ] WNL,  [   ] other:  RIGHT LE: [x   ] WNL,  [   ] other:   LEFT    LE: [ x  ] WNL,  [   ] other:    Tone: [x    ] wnl,   [    ]  other:  DTRs: [ x  ]symmetric, [   ] other:  Coordination:   [    ] intact,   [  x  ] other:  mild decrease both legs                                                                         Sensory: [   x ] Intact to light touch,   [    ] other: Cognitive:           [   ] WNL           [ x  ]confusion  with recnet UTI, not baseline   Psych:                  [ x  ] WNL           [   ] hallucinations   [   ]agitation   [   ] delusion   [   ]depression    MEDICATIONS  (STANDING):  amLODIPine   Tablet 10 milliGRAM(s) Oral daily  aspirin enteric coated 81 milliGRAM(s) Oral daily  atorvastatin 80 milliGRAM(s) Oral at bedtime  chlorhexidine 2% Cloths 1 Application(s) Topical <User Schedule>  clopidogrel Tablet 75 milliGRAM(s) Oral daily  heparin   Injectable 5000 Unit(s) SubCutaneous every 12 hours  pantoprazole    Tablet 40 milliGRAM(s) Oral before breakfast  polyethylene glycol 3350 17 Gram(s) Oral two times a day  senna 2 Tablet(s) Oral at bedtime    MEDICATIONS  (PRN):  acetaminophen     Tablet .. 650 milliGRAM(s) Oral every 6 hours PRN Temp greater or equal to 38C (100.4F), Mild Pain (1 - 3)  bisacodyl 10 milliGRAM(s) Oral at bedtime PRN Constipation  ondansetron   Disintegrating Tablet 4 milliGRAM(s) Oral every 8 hours PRN Nausea and/or Vomiting      RECENT LABS/IMAGING                          14.0   9.73  )-----------( 253      ( 21 Aug 2023 07:06 )             41.4     08-21    142  |  109  |  30<H>  ----------------------------<  93  4.3   |  22  |  1.0    Ca    9.1      21 Aug 2023 07:06  Mg     2.2     08-21    TPro  6.2  /  Alb  3.7  /  TBili  0.7  /  DBili  x   /  AST  32  /  ALT  47<H>  /  AlkPhos  191<H>  08-21

## 2023-08-22 RX ADMIN — HEPARIN SODIUM 5000 UNIT(S): 5000 INJECTION INTRAVENOUS; SUBCUTANEOUS at 17:37

## 2023-08-22 RX ADMIN — Medication 81 MILLIGRAM(S): at 12:14

## 2023-08-22 RX ADMIN — AMLODIPINE BESYLATE 10 MILLIGRAM(S): 2.5 TABLET ORAL at 06:10

## 2023-08-22 RX ADMIN — ATORVASTATIN CALCIUM 80 MILLIGRAM(S): 80 TABLET, FILM COATED ORAL at 22:11

## 2023-08-22 RX ADMIN — PANTOPRAZOLE SODIUM 40 MILLIGRAM(S): 20 TABLET, DELAYED RELEASE ORAL at 06:10

## 2023-08-22 RX ADMIN — CHLORHEXIDINE GLUCONATE 1 APPLICATION(S): 213 SOLUTION TOPICAL at 06:09

## 2023-08-22 RX ADMIN — CLOPIDOGREL BISULFATE 75 MILLIGRAM(S): 75 TABLET, FILM COATED ORAL at 12:14

## 2023-08-22 RX ADMIN — HEPARIN SODIUM 5000 UNIT(S): 5000 INJECTION INTRAVENOUS; SUBCUTANEOUS at 06:10

## 2023-08-22 NOTE — PROGRESS NOTE ADULT - ASSESSMENT
Neuropsychology Follow Up   Pain: Denied Location: N/A Ratin/10 Intervention: N/A   Orientation: WF (incorrect for date +1)   Arousal Level: Alert   Behavior: Cooperative, pleasant   Affect Range:  WFL    Needed: No   Attention: ? WFL    Insight into illness/deficits: Fair+     Treatment Session Focused on Cognition     Patient was seen for a follow up session. Neuropsychological testing was completed, and the following measures were given: CVLT (Brief Form), RBANS (Form A) story memory, Trail Making Test, WAIS Digit Span, and FAS and COWAT.      Results:   Attention: The patient completed Trail Making Test Part A in 70 seconds with no errors. She was able to verbally repeat strings of digits of 3 digits in length.     Working Memory/Executive Functioning: The patient was unable to complete Part B of the Trail Making Test , which was discontinued at 300 seconds after 3-4 set loss errors.  Notably, she nearly completed the task (with 1 additional stimulus to draw a line to), but required much prompting during the task and had lost set by the time she discontinued.  Patient was able to repeat strings of 2 digits in reverse order.     Verbal Flunecy: Patient produced 15 words to letter cues across 3 trials of 60 seconds each with 3 repetition and no intrusion errors, as well as 16 animal names in 60 seconds with 2 repetition and no intrusion errors.  Throughout the evaluation, word-finding difficulties and phonemic paraphasic errors were observed.  Patient made several paraphasic errors during fluency tasks.     Learning/Memory: Verbal list learning was generally positive as the patient recalled 2, 4, 4, and 7 of 9 words across 4 trials (2 repetition/no intrusion errors).  She recalled 5 of the 9 word-list words spontaneously after a short delay with 2 repetitions and 1 intrusion.  After a longer delay, she recalled 4 of the 9 words spontaneously with 1 repetition and with 1 intrusion.  She benefitted minimally from cues ( 5/9 after cues provided with 1 intrusion/no repetitions).  She gave 7/9 correct recognition responses in a yes/no paradigm with 7 false-positive responses.   She recognized 9 of 9 target words in a forced-choice paradigm. Memory for contextual information was poor as she recalled 2 and 4 of 12 story elements across 2 learning trials (total=6/24).  She spontaneously recalled 3 of the 12 story elements after a delay.     Summary:  The patient demonstrates a diffuse pattern of difficulties in frontal/executive functions including basic auditory attention, graphomotor speed, working memory, and speeded sequencing and set shifting.  Therre is also evidence of a postivie, but somewhat slow learning curve which has downstream effects on her memory performance.  It must be noted that the patient’s language is affected, and thus, verbal tasks performance may be artificially deflated given the semantic component involved in these tasks.  Still, her category fluency remained intact.  Her speech is noted to be imprecise and with some paraphasic errors and occasional margaret word-finding difficulty.  Etiology is consistent with M2 MCA territory stroke.  There is evidence on imaging of older vascular incidents of the right corona radiata and left occiputal region which may be contributory.  The patient demonstrates fair+/good insight into her deficits, and appears somewhat anxious and times and frustrated by testing procedures.  Subjective decline in functioning and anxiousness may additionally contribute to findings given that this may be cognitively taxing or burdensome to her attentional capacity.  We will continue to monitor the patient’s cognition to provide meaningful recommendations moving forward.  She may benefit from cognitive remediation and will benefit from support provided to her by treatment team and loved ones involved in her care.     Goals: ? Facilitate Positive Coping ? Cognitive Assessment    Plan: 1-2 times weeklt at 30-60 minutes per session

## 2023-08-22 NOTE — PROGRESS NOTE ADULT - ASSESSMENT
ASSESSMENT/PLAN  78 year old female with no known medical history (does not see a health care provider for past 20 years) brought in by son to the emergency room for confusion. As per son, patient was noted  during a phone conversation to be confused, and when he got home the confusion was worse. Unsure of last time of known welll. evaluated by neurology and ICU. Pt presented with aphasia. taken to ICU for further neuro monitoring.  labs showed leukocytosis with + nitrite on UA), s/p fall pt with R ankle fracture seen by ortho and managed conservatively with CAM boot.  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       #Rehab of gait disorder secondary to multiple intracranial vascular territory infarcts, likely 2/2 thromboembolic event in setting of intracranial vasculopathy, and right ankle fracture of lateral malleolus  #CVA; Acute Left temporal, L parietal, and L insular  - Continue DAPT 81mg ASA QD, Plavix 75mg QD, high dose statin 80mg atorvastatin 80mg qhs  - maintain SBP goals 140-180.   - continue PT/OT/SLP   - GERARDO planned for 8/14 -- refused by patient and family.   - TTE 8/10 revealed: normal LV function with EF 65%, mild MVR. no PFO or thrombus  - MCOT placed (8/17/2023)    #Aphasia  - ST/ Neuropsych eval  - improving    #Azotemia   - promote oral hydration   - given IVF overnight and BUN improved to 29  - appears to be chronic, stable    #HTN  - SBP goal per neuro 140-180  - c/w Norvasc 10mg QD  - 130- 150  - stable    #R lateral malleolus ankle fracture  - WBAT in CAM boot  - conservative non-surgical management  - Pain control tylenol 650mg q6hr PRN  - followup with orthopedics, Dr. Enoc Perez in 2 weeks orthopedics.     #s/p E.coli UTI  - completed antibiotic    #Misc  DVT ppx: SQH  Zofran for nausea  GI/bowel ppx: protonix, senna qhs  Diet: DASH/TLC diet  Code status: Full Code  -Skin: No active issues at this time    Precautions / PROPHYLAXIS:      - Falls, safety    - Ortho: Weight bearing status: WABT with CAM boot of RLE       ASSESSMENT/PLAN  78 year old female with no known medical history (does not see a health care provider for past 20 years) brought in by son to the emergency room for confusion. As per son, patient was noted  during a phone conversation to be confused, and when he got home the confusion was worse. Unsure of last time of known welll. evaluated by neurology and ICU. Pt presented with aphasia. taken to ICU for further neuro monitoring.  labs showed leukocytosis with + nitrite on UA), s/p fall pt with R ankle fracture seen by ortho and managed conservatively with CAM boot.  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       #Rehab of gait disorder secondary to multiple intracranial vascular territory infarcts, likely 2/2 thromboembolic event in setting of intracranial vasculopathy, and right ankle fracture of lateral malleolus  #CVA; Acute Left temporal, L parietal, and L insular  - Continue DAPT 81mg ASA QD, Plavix 75mg QD, high dose statin 80mg atorvastatin 80mg qhs  - maintain SBP goals 140-180.   - continue PT/OT/SLP   - GERARDO planned for 8/14 -- refused by patient and family.   - TTE 8/10 revealed: normal LV function with EF 65%, mild MVR. no PFO or thrombus  - MCOT placed (8/17/2023)    #Aphasia  - ST/ Neuropsych eval  - improving    #Azotemia   - promote oral hydration   - given IVF overnight and BUN improved to 29  - appears to be chronic, stable    #HTN  - SBP goal per neuro 140-180  - c/w Norvasc 10mg QD  - 130- 150  - stable    #R lateral malleolus ankle fracture  - WBAT in CAM boot  - conservative non-surgical management  - Pain control tylenol 650mg q6hr PRN  - followup with orthopedics, Dr. Enoc Perez in 2 weeks orthopedics.     #s/p E.coli UTI  - completed antibiotic  - asymptomatic    #Misc  DVT ppx: SQH  Zofran for nausea  GI/bowel ppx: protonix, senna qhs  Diet: DASH/TLC diet  Code status: Full Code  -Skin: No active issues at this time    Precautions / PROPHYLAXIS:      - Falls, safety    - Ortho: Weight bearing status: WABT with CAM boot of RLE

## 2023-08-22 NOTE — PROGRESS NOTE ADULT - SUBJECTIVE AND OBJECTIVE BOX
Patient is a 78y old  Female who presents with a chief complaint of Acute rehabilitation of gait disorder secondary to multiple intracranial vascular territory infarcts, likely 2/2 thromboembolic event in setting of intracranial vasculopathy and right ankle fracture of lateral malleolus (18 Aug 2023 11:55)      HPI:  Patient is 79 yo female with hx of AMS in the past for UTI who has not seen a physician for over 20 yrs presenting with AMS that started yesterday, and progressively worsening. As per son, patient has been doing ok, yesterday morning, and around noon time, she started to get confused.  Offers no other complaints.  At baseline pt has no cognitive impairment per family. Significant labs/vitals for BP at 213/94 on presentation, WBC 15, nitrite+ on UA.    Pt evaluated by neurology and ICU and found to have NIHSS of 4 due to 2 questions and aphasia on initial presentation on 8/10/23.    CT Head Non-Contrast   - No definite acute intracranial pathology. Moderate sized chronic left occipital infarct. Multiple age-indeterminate lacunar infarcts along the right coronal  radiata.     - Indeterminate confluent hypodensity in the right frontal subcortical white matter without mass effect. Nonspecific and differential can include focus of gliosis, demyelination,    chronic microvascular changes amongst others.     CT Angio Head w/ IV Cont:    - Focal occlusion in the proximal inferior M2 branch of the left MCA with diminutive enhancement in the distal branches.   - Irregular stenosis involving multiple M2 branches of the right MCA and distal right TUCKER.   - Multifocal stenosis in the bilateral posterior cerebral arteries.    MRI reveals Acute infarcts in the left temporal lobe, left parietal lobe, and left insular cortex. Patchy subacute/acute infarct in the right frontal subcortical region superimposed on small chronic lacunar infarcts. Mild chronic microvascular ischemic changes and scattered chronic infarcts.     Pt was seen by orthopedics s/p fall with ankle pain and found to have R lateral malleolus ankle fracture to be conservatively managed with CAM boot and be WBAT. Pt was initially planned for GERARDO but patient/family refused GERARDO and loop recorder placement. MCOT set up on 8/17/2023.    obtained TTE 8/10:    1. Normal global left ventricular systolic function.   2. LV Ejection Fraction by Martinez's Method with a biplane EF of 65 %.   3. Normal left atrial size.   4. There is no evidence of pericardial effusion.   5. Mild mitral valve regurgitation.    The patient was evaluated by the PM&R team once medically stable. The patient was found to have functional limitation in terms of muscle strength, endurance, physical mobility, and ability to carry out activities of daily living (self care, transfers, and ambulation). The patient was started on a course of bedside therapy, She currently requires CG for bed mobility, min assist for transfers and to ambulate with a RW 30 feet in right CAM Boot WBAT, and min assist for UE dessing, mod assist for LE dressing. The pt is motivated and able to start 3 hours of therapy  daily for 6-7 days a week. the patient was deemed to be a good candidate for admission for acute inpatient rehab. The patient was admitted to acute inpatient rehab on 8/17/23.   (17 Aug 2023 10:19)      TODAY'S SUBJECTIVE & REVIEW OF SYMPTOMS:   Pt seen and examined at bedside.  No overnight events.  No new complaints.  Voiding urine/stool spontaneously.  VS reviewed.  Pt has MCOT since 8/17/2023.    Review of Systems:   Constitutional:    [ x  ] WNL           [   ] poor appetite   [   ] insomnia   [   ] tired   Cardio:                [x   ] WNL           [   ] CP   [   ] SARMIENTO   [   ] palpitations               Resp:                   [ x  ] WNL           [   ] SOB   [   ] cough   [   ] wheezing   GI:                        [ x  ] WNL           [   ] constipation  [   ] diarrhea   [   ] abdominal pain   [   ] nausea   [   ] emesis                                :                      [   ] WNL           [   ] ZAVALA  [   ] dysuria   [   ] difficulty voiding        [ x ]  new urinary incontinence     Endo:                   [x   ] WNL          [   ] polyuria   [   ] temperature intolerance                 Skin:                     [x   ] WNL          [   ] pain   [   ] wound   [   ] rash   MSK:                    [    ] WNL          [   ] muscle pain   [   x] joint pain/ stiffness right ankle   [   ] muscle tenderness   [   ] swelling   Neuro:                 [   ] WNL per HPI          [   ] HA   [   ] change in vision   [   ] tremor   [   ] weakness   [   ]dysphagia               Physical Exam:   Vital Signs (24 Hrs):  T(C): 36.6 (08-22-23 @ 05:51), Max: 36.6 (08-21-23 @ 20:13)  HR: 77 (08-22-23 @ 05:51) (64 - 77)  BP: 134/58 (08-22-23 @ 05:51) (134/58 - 151/67)  RR: 19 (08-22-23 @ 05:51) (18 - 19)  SpO2: --  Wt(kg): --  Daily     Daily     I&O's Summary        General:[x   ] NAD, Resting Comfortable,   [   ] other:                                HEENT: [ x  ] NC/AT, EOMI, PERRL , Normal Conjunctivae,   [   ] other:  Cardio: [ x  ] RRR, no murmer,   [   ] other:                              Pulm: [ x  ] No Respiratory Distress,  Lungs CTAB,   [   ] other:                       Abdomen: [   ]ND/NT, Soft,   [   ] other:    : [ x  ] NO ZAVALA CATHETER, [   ] ZAVALA CATHETER- no meatal tear, no discharge, [   ] other:                                            MSK: [   ] No joint swelling, Full ROM,   [ x  ] other:  right lateral ankle swelling and tenderness                                       Ext: [ x  ]No C/C/E, No calf tenderness,   [   ]other:    Skin: [x   ]intact,   [   ] other:                                                                   Neurological Examination:  Cognitive: [    ] AAO x 3,   [ x   ]  other:  oriented to self and place, year with choices                                                              Attention:  [ x   ] intact,   [    ]  other:                            Memory: [    ] intact,    [  x  ]  other:   limited by aphasia  Mood/Affect: [   x ] wnl,    [    ]  other:                                                                             Communication: [    ]Fluent, no dysarthria, following commands:  [  x  ] other: good comprehension, mild to moderate nonfluent expressive impairment - improving  CN II - XII:  [  x  ] intact,  [    ] other:                                                                                        Motor:   RIGHT UE: [x   ] WNL,  [   ] other:  LEFT    UE: [ x  ] WNL,  [   ] other:  RIGHT LE: [x   ] WNL,  [   ] other:   LEFT    LE: [ x  ] WNL,  [   ] other:    Tone: [x    ] wnl,   [    ]  other:  DTRs: [ x  ]symmetric, [   ] other:  Coordination:   [    ] intact,   [  x  ] other:  mild decrease both legs                                                                         Sensory: [   x ] Intact to light touch,   [    ] other: Cognitive:           [   ] WNL           [ x  ]confusion  with recnet UTI, not baseline   Psych:                  [ x  ] WNL           [   ] hallucinations   [   ]agitation   [   ] delusion   [   ]depression    MEDICATIONS  (STANDING):  amLODIPine   Tablet 10 milliGRAM(s) Oral daily  aspirin enteric coated 81 milliGRAM(s) Oral daily  atorvastatin 80 milliGRAM(s) Oral at bedtime  chlorhexidine 2% Cloths 1 Application(s) Topical <User Schedule>  clopidogrel Tablet 75 milliGRAM(s) Oral daily  heparin   Injectable 5000 Unit(s) SubCutaneous every 12 hours  pantoprazole    Tablet 40 milliGRAM(s) Oral before breakfast  polyethylene glycol 3350 17 Gram(s) Oral two times a day  senna 2 Tablet(s) Oral at bedtime    MEDICATIONS  (PRN):  acetaminophen     Tablet .. 650 milliGRAM(s) Oral every 6 hours PRN Temp greater or equal to 38C (100.4F), Mild Pain (1 - 3)  bisacodyl 10 milliGRAM(s) Oral at bedtime PRN Constipation  ondansetron   Disintegrating Tablet 4 milliGRAM(s) Oral every 8 hours PRN Nausea and/or Vomiting      RECENT LABS/IMAGING                          14.0   9.73  )-----------( 253      ( 21 Aug 2023 07:06 )             41.4     08-21    142  |  109  |  30<H>  ----------------------------<  93  4.3   |  22  |  1.0    Ca    9.1      21 Aug 2023 07:06  Mg     2.2     08-21    TPro  6.2  /  Alb  3.7  /  TBili  0.7  /  DBili  x   /  AST  32  /  ALT  47<H>  /  AlkPhos  191<H>  08-21     Patient is a 78y old  Female who presents with a chief complaint of Acute rehabilitation of gait disorder secondary to multiple intracranial vascular territory infarcts, likely 2/2 thromboembolic event in setting of intracranial vasculopathy and right ankle fracture of lateral malleolus (18 Aug 2023 11:55)      HPI:  Patient is 77 yo female with hx of AMS in the past for UTI who has not seen a physician for over 20 yrs presenting with AMS that started yesterday, and progressively worsening. As per son, patient has been doing ok, yesterday morning, and around noon time, she started to get confused.  Offers no other complaints.  At baseline pt has no cognitive impairment per family. Significant labs/vitals for BP at 213/94 on presentation, WBC 15, nitrite+ on UA.    Pt evaluated by neurology and ICU and found to have NIHSS of 4 due to 2 questions and aphasia on initial presentation on 8/10/23.    CT Head Non-Contrast   - No definite acute intracranial pathology. Moderate sized chronic left occipital infarct. Multiple age-indeterminate lacunar infarcts along the right coronal  radiata.     - Indeterminate confluent hypodensity in the right frontal subcortical white matter without mass effect. Nonspecific and differential can include focus of gliosis, demyelination,    chronic microvascular changes amongst others.     CT Angio Head w/ IV Cont:    - Focal occlusion in the proximal inferior M2 branch of the left MCA with diminutive enhancement in the distal branches.   - Irregular stenosis involving multiple M2 branches of the right MCA and distal right TUCKER.   - Multifocal stenosis in the bilateral posterior cerebral arteries.    MRI reveals Acute infarcts in the left temporal lobe, left parietal lobe, and left insular cortex. Patchy subacute/acute infarct in the right frontal subcortical region superimposed on small chronic lacunar infarcts. Mild chronic microvascular ischemic changes and scattered chronic infarcts.     Pt was seen by orthopedics s/p fall with ankle pain and found to have R lateral malleolus ankle fracture to be conservatively managed with CAM boot and be WBAT. Pt was initially planned for GERARDO but patient/family refused GERARDO and loop recorder placement. MCOT set up on 8/17/2023.    obtained TTE 8/10:    1. Normal global left ventricular systolic function.   2. LV Ejection Fraction by Martinez's Method with a biplane EF of 65 %.   3. Normal left atrial size.   4. There is no evidence of pericardial effusion.   5. Mild mitral valve regurgitation.    The patient was evaluated by the PM&R team once medically stable. The patient was found to have functional limitation in terms of muscle strength, endurance, physical mobility, and ability to carry out activities of daily living (self care, transfers, and ambulation). The patient was started on a course of bedside therapy, She currently requires CG for bed mobility, min assist for transfers and to ambulate with a RW 30 feet in right CAM Boot WBAT, and min assist for UE dessing, mod assist for LE dressing. The pt is motivated and able to start 3 hours of therapy  daily for 6-7 days a week. the patient was deemed to be a good candidate for admission for acute inpatient rehab. The patient was admitted to acute inpatient rehab on 8/17/23.   (17 Aug 2023 10:19)      TODAY'S SUBJECTIVE & REVIEW OF SYMPTOMS:   Pt seen and examined at bedside.  No overnight events.  No new complaints.  Voiding urine/stool spontaneously.  VS reviewed.  Pt has MCOT since 8/17/2023.    Review of Systems:   Constitutional:    [ x  ] WNL           [   ] poor appetite   [   ] insomnia   [   ] tired   Cardio:                [x   ] WNL           [   ] CP   [   ] SARMIENTO   [   ] palpitations               Resp:                   [ x  ] WNL           [   ] SOB   [   ] cough   [   ] wheezing   GI:                        [ x  ] WNL           [   ] constipation  [   ] diarrhea   [   ] abdominal pain   [   ] nausea   [   ] emesis                                :                      [   ] WNL           [   ] ZAVALA  [   ] dysuria   [   ] difficulty voiding        [ x ]  new urinary incontinence     Endo:                   [x   ] WNL          [   ] polyuria   [   ] temperature intolerance                 Skin:                     [x   ] WNL          [   ] pain   [   ] wound   [   ] rash   MSK:                    [    ] WNL          [   ] muscle pain   [   x] joint pain/ stiffness right ankle   [   ] muscle tenderness   [   ] swelling   Neuro:                 [   ] WNL [ x ] per HPI          [   ] HA   [   ] change in vision   [   ] tremor   [   ] weakness   [   ]dysphagia               Physical Exam:   Vital Signs (24 Hrs):  T(C): 36.6 (08-22-23 @ 05:51), Max: 36.6 (08-21-23 @ 20:13)  HR: 77 (08-22-23 @ 05:51) (64 - 77)  BP: 134/58 (08-22-23 @ 05:51) (134/58 - 151/67)  RR: 19 (08-22-23 @ 05:51) (18 - 19)    General:[x   ] NAD, Resting Comfortable,   [   ] other:                                HEENT: [ x  ] NC/AT, EOMI, PERRL , Normal Conjunctivae,   [   ] other:  Cardio: [ x  ] RRR, no murmer,   [   ] other:                              Pulm: [ x  ] No Respiratory Distress,  Lungs CTAB,   [   ] other:                       Abdomen: [   ]ND/NT, Soft,   [   ] other:    : [ x  ] NO ZAVALA CATHETER, [   ] ZAVALA CATHETER- no meatal tear, no discharge, [   ] other:                                            MSK: [   ] No joint swelling, Full ROM,   [ x  ] other:  right lateral ankle swelling and tenderness                                       Ext: [ x  ]No C/C/E, No calf tenderness,   [   ]other:    Skin: [x   ]intact,   [   ] other:                                                                   Neurological Examination:  Cognitive: [    ] AAO x 3,   [ x   ]  other:  oriented to self and place, year with choices                                                              Attention:  [ x   ] intact,   [    ]  other:                            Memory: [    ] intact,    [  x  ]  other:   limited by aphasia  Mood/Affect: [   x ] wnl,    [    ]  other:                                                                             Communication: [    ]Fluent, no dysarthria, following commands:  [  x  ] other: good comprehension, mild to moderate nonfluent expressive impairment - improving  CN II - XII:  [  x  ] intact,  [    ] other:                                                                                        Motor:   RIGHT UE: [x   ] WNL,  [   ] other:  LEFT    UE: [ x  ] WNL,  [   ] other:  RIGHT LE: [x   ] WNL,  [   ] other:   LEFT    LE: [ x  ] WNL,  [   ] other:    Tone: [x    ] wnl,   [    ]  other:  DTRs: [ x  ]symmetric, [   ] other:  Coordination:   [    ] intact,   [  x  ] other:  mild decrease both legs                                                                         Sensory: [   x ] Intact to light touch,   [    ] other: Cognitive:           [   ] WNL           [ x  ]confusion  with recnet UTI, not baseline   Psych:                  [ x  ] WNL           [   ] hallucinations   [   ]agitation   [   ] delusion   [   ]depression    MEDICATIONS  (STANDING):  amLODIPine   Tablet 10 milliGRAM(s) Oral daily  aspirin enteric coated 81 milliGRAM(s) Oral daily  atorvastatin 80 milliGRAM(s) Oral at bedtime  chlorhexidine 2% Cloths 1 Application(s) Topical <User Schedule>  clopidogrel Tablet 75 milliGRAM(s) Oral daily  heparin   Injectable 5000 Unit(s) SubCutaneous every 12 hours  pantoprazole    Tablet 40 milliGRAM(s) Oral before breakfast  polyethylene glycol 3350 17 Gram(s) Oral two times a day  senna 2 Tablet(s) Oral at bedtime    MEDICATIONS  (PRN):  acetaminophen     Tablet .. 650 milliGRAM(s) Oral every 6 hours PRN Temp greater or equal to 38C (100.4F), Mild Pain (1 - 3)  bisacodyl 10 milliGRAM(s) Oral at bedtime PRN Constipation  ondansetron   Disintegrating Tablet 4 milliGRAM(s) Oral every 8 hours PRN Nausea and/or Vomiting      RECENT LABS/IMAGING                          14.0   9.73  )-----------( 253      ( 21 Aug 2023 07:06 )             41.4     08-21    142  |  109  |  30<H>  ----------------------------<  93  4.3   |  22  |  1.0    Ca    9.1      21 Aug 2023 07:06  Mg     2.2     08-21    TPro  6.2  /  Alb  3.7  /  TBili  0.7  /  DBili  x   /  AST  32  /  ALT  47<H>  /  AlkPhos  191<H>  08-21

## 2023-08-23 RX ADMIN — HEPARIN SODIUM 5000 UNIT(S): 5000 INJECTION INTRAVENOUS; SUBCUTANEOUS at 05:34

## 2023-08-23 RX ADMIN — HEPARIN SODIUM 5000 UNIT(S): 5000 INJECTION INTRAVENOUS; SUBCUTANEOUS at 17:47

## 2023-08-23 RX ADMIN — Medication 81 MILLIGRAM(S): at 12:36

## 2023-08-23 RX ADMIN — POLYETHYLENE GLYCOL 3350 17 GRAM(S): 17 POWDER, FOR SOLUTION ORAL at 17:44

## 2023-08-23 RX ADMIN — ATORVASTATIN CALCIUM 80 MILLIGRAM(S): 80 TABLET, FILM COATED ORAL at 21:22

## 2023-08-23 RX ADMIN — CHLORHEXIDINE GLUCONATE 1 APPLICATION(S): 213 SOLUTION TOPICAL at 05:34

## 2023-08-23 RX ADMIN — AMLODIPINE BESYLATE 10 MILLIGRAM(S): 2.5 TABLET ORAL at 05:34

## 2023-08-23 RX ADMIN — PANTOPRAZOLE SODIUM 40 MILLIGRAM(S): 20 TABLET, DELAYED RELEASE ORAL at 05:34

## 2023-08-23 RX ADMIN — CLOPIDOGREL BISULFATE 75 MILLIGRAM(S): 75 TABLET, FILM COATED ORAL at 12:37

## 2023-08-23 NOTE — PROGRESS NOTE ADULT - ASSESSMENT
ASSESSMENT/PLAN  78 year old female with no known medical history (does not see a health care provider for past 20 years) brought in by son to the emergency room for confusion. As per son, patient was noted  during a phone conversation to be confused, and when he got home the confusion was worse. Unsure of last time of known welll. evaluated by neurology and ICU. Pt presented with aphasia. taken to ICU for further neuro monitoring.  labs showed leukocytosis with + nitrite on UA), s/p fall pt with R ankle fracture seen by ortho and managed conservatively with CAM boot.  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       #Rehab of gait disorder secondary to multiple intracranial vascular territory infarcts, likely 2/2 thromboembolic event in setting of intracranial vasculopathy, and right ankle fracture of lateral malleolus  #CVA; Acute Left temporal, L parietal, and L insular  - Continue DAPT 81mg ASA QD, Plavix 75mg QD, high dose statin 80mg atorvastatin 80mg qhs  - maintain SBP goals 140-180.   - continue PT/OT/SLP   - GERARDO planned for 8/14 -- refused by patient and family.   - TTE 8/10 revealed: normal LV function with EF 65%, mild MVR. no PFO or thrombus  - MCOT placed (8/17/2023)    #Aphasia  - ST/ Neuropsych eval  - improving    #Azotemia    BUN 30 (8/21/2023)  - promote oral hydration   - given IVF (8/17/2023) and BUN improved to 29  - appears to be chronic, stable    #HTN  - SBP goal per neuro 140-180  - c/w Norvasc 10mg QD  - 130- 150  - stable    #R lateral malleolus ankle fracture  - WBAT in CAM boot  - conservative non-surgical management  - Pain control tylenol 650mg q6hr PRN  - followup with orthopedics, Dr. Enoc Perez in 2 weeks orthopedics.     #s/p E.coli UTI  - completed antibiotic  - asymptomatic    #Misc  DVT ppx: SQH  Zofran for nausea  GI/bowel ppx: protonix, senna qhs  Diet: DASH/TLC diet  Code status: Full Code  -Skin: No active issues at this time    Precautions / PROPHYLAXIS:      - Falls, safety    - Ortho: Weight bearing status: WABT with CAM boot of RLE       ASSESSMENT/PLAN  78 year old female with no known medical history (does not see a health care provider for past 20 years) brought in by son to the emergency room for confusion. As per son, patient was noted  during a phone conversation to be confused, and when he got home the confusion was worse. Unsure of last time of known welll. evaluated by neurology and ICU. Pt presented with aphasia. taken to ICU for further neuro monitoring.  labs showed leukocytosis with + nitrite on UA), s/p fall pt with R ankle fracture seen by ortho and managed conservatively with CAM boot.  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       #Rehab of gait disorder secondary to multiple intracranial vascular territory infarcts, likely 2/2 thromboembolic event in setting of intracranial vasculopathy, and right ankle fracture of lateral malleolus  #CVA; Acute Left temporal, L parietal, and L insular  - Continue DAPT 81mg ASA QD, Plavix 75mg QD, high dose statin 80mg atorvastatin 80mg qhs  - maintain SBP goals 140-180.   - continue PT/OT/SLP   - GERARDO planned for 8/14 -- refused by patient and family.   - TTE 8/10 revealed: normal LV function with EF 65%, mild MVR. no PFO or thrombus  - MCOT placed (8/17/2023)    #Aphasia  - ST/ Neuropsych following  - improving    #Azotemia    BUN 30 (8/21/2023)  - promote oral hydration   - given IVF (8/17/2023) and BUN improved to 29  - appears to be chronic, stable    #HTN  - SBP goal per neuro 140-180  - c/w Norvasc 10mg QD  - 130- 150  - stable    #R lateral malleolus ankle fracture  - WBAT in CAM boot  - conservative non-surgical management  - Pain control tylenol 650mg q6hr PRN  - followup with orthopedics, Dr. Enoc Perez in 2 weeks orthopedics.     #s/p E.coli UTI  - completed antibiotic  - asymptomatic    #Misc  DVT ppx: SQH  Zofran for nausea  GI/bowel ppx: protonix, senna qhs  Diet: DASH/TLC diet  Code status: Full Code  -Skin: No active issues at this time    Precautions / PROPHYLAXIS:      - Falls, safety    - Ortho: Weight bearing status: WABT with CAM boot of RLE

## 2023-08-23 NOTE — CHART NOTE - NSCHARTNOTEFT_GEN_A_CORE
MCOT WAS APPLIED ON 8/17 by EP NP .   note as below . Patient to follow up as an out patient .   MCOT applied at bedside for continuous op cardiac monitoring   Pt  provided with teach back instructions on care and management of device  f/u with cardiology outpatient.. MCOT WAS APPLIED ON 8/17 by EP NP .   note as below . Patient to follow up as an out patient upon discharge .  MCOT applied at bedside for continuous op cardiac monitoring   Pt  provided with teach back instructions on care and management of device  f/u with cardiology outpatient..  Her monitor battery was not  charged yet , its being charged   now, Rn made aware of it .     Culture Results:   >100,000 CFU/ml Escherichia coli (08-09 @ 02:04)        MEDICATIONS  (STANDING):  amLODIPine   Tablet 10 milliGRAM(s) Oral daily  aspirin enteric coated 81 milliGRAM(s) Oral daily  atorvastatin 80 milliGRAM(s) Oral at bedtime  chlorhexidine 2% Cloths 1 Application(s) Topical <User Schedule>  clopidogrel Tablet 75 milliGRAM(s) Oral daily  heparin   Injectable 5000 Unit(s) SubCutaneous every 12 hours  pantoprazole    Tablet 40 milliGRAM(s) Oral before breakfast  polyethylene glycol 3350 17 Gram(s) Oral two times a day  senna 2 Tablet(s) Oral at bedtime    MEDICATIONS  (PRN):  acetaminophen     Tablet .. 650 milliGRAM(s) Oral every 6 hours PRN Temp greater or equal to 38C (100.4F), Mild Pain (1 - 3)  bisacodyl 10 milliGRAM(s) Oral at bedtime PRN Constipation  ondansetron   Disintegrating Tablet 4 milliGRAM(s) Oral every 8 hours PRN Nausea and/or Vomiting      Patient is a 78y old  Female who presents with a chief complaint of Acute rehabilitation of gait disorder secondary to multiple intracranial vascular territory infarcts, likely 2/2 thromboembolic event in setting of intracranial vasculopathy and right ankle fracture of lateral malleolus (18 Aug 2023 11:55)      HPI:  Patient is 77 yo female with hx of AMS in the past for UTI who has not seen a physician for over 20 yrs presenting with AMS that started yesterday, and progressively worsening. As per son, patient has been doing ok, yesterday morning, and around noon time, she started to get confused.  Offers no other complaints.  At baseline pt has no cognitive impairment per family. Significant labs/vitals for BP at 213/94 on presentation, WBC 15, nitrite+ on UA.    Pt evaluated by neurology and ICU and found to have NIHSS of 4 due to 2 questions and aphasia on initial presentation on 8/10/23.    CT Head Non-Contrast   - No definite acute intracranial pathology. Moderate sized chronic left occipital infarct. Multiple age-indeterminate lacunar infarcts along the right coronal  radiata.     - Indeterminate confluent hypodensity in then the right frontal subcortical white matter without mass effect. Nonspecific and differential can include focus of gliosis, demyelination,    chronic microvascular changes amongst others.     CT Angio Head w/ IV Cont:    - Focal occlusion in the proximal inferior M2 branch of the left MCA with diminutive enhancement in the distal branches.   - Irregular stenosis involving multiple M2 branches of the right MCA and distal right TUCKER.   - Multifocal stenosis in the bilateral posterior cerebral arteries.    MRI reveals Acute infarcts in the left temporal lobe, left parietal lobe, and left insular cortex. Patchy subacute/acute infarct in the right frontal subcortical region superimposed on small chronic lacunar infarcts. Mild chronic microvascular ischemic changes and scattered chronic infarcts.     Pt was seen by orthopedics s/p fall with ankle pain and found to have R lateral malleolus ankle fracture to be conservatively managed with CAM boot and be WBAT. Pt was initially planned for GERARDO but patient/family refused GERARDO and loop recorder placement. MCOT set up on 8/17/2023.    obtained TTE 8/10:    1. Normal global left ventricular systolic function.   2. LV Ejection Fraction by Martinez's Method with a biplane EF of 65 %.   3. Normal left atrial size.   4. There is no evidence of pericardial effusion.   5. Mild mitral valve regurgitation.    The patient was evaluated by the PM&R team once medically stable. The patient was found to have functional limitation in terms of muscle strength, endurance, physical mobility, and ability to carry out activities of daily living (self care, transfers, and ambulation). The patient was started on a course of bedside therapy, She currently requires CG for bed mobility, min assist for transfers and to ambulate with a RW 30 feet in right CAM Boot WBAT, and min assist for UE dessing, mod assist for LE dressing. The pt is motivated and able to start 3 hours of therapy  daily for 6-7 days a week. the patient was deemed to be a good candidate for admission for acute inpatient rehab. The patient was admitted to acute inpatient rehab on 8/17/23.          < from: 12 Lead ECG (08.17.23 @ 19:56) >      Ventricular Rate 72 BPM    Atrial Rate 72 BPM    P-R Interval 124 ms    QRS Duration 90 ms    Q-T Interval 412 ms    QTC Calculation(Bazett) 451 ms    P Axis 27 degrees    R Axis -31 degrees    T Axis -8 degrees    Diagnosis Line Normal sinus rhythm  Left axis deviation  RSR' or QR pattern in V1 suggests right ventricular conduction delay  Moderate voltage criteria for LVH, may be normal variant  Possible Lateral infarct , age undetermined  Inferior infarct , age undetermined  Abnormal ECG    Confirmed by ernesto richard ([id number]) on 8/18/2023 6:53:38 AM    < end of copied text >    < from: TTE Echo Complete w/o Contrast w/ Doppler (08.10.23 @ 14:14) >    ummary:   1. Normal global left ventricular systolic function.   2. LV Ejection Fraction by Martinez's Method with a biplane EF of 65 %.   3. Normal left atrial size.   4. There is no evidence of pericardial effusion.   5. Mild micro-valve regurgitation.    PHYSICIAN INTERPRETATION:  Left Ventricle: Global LV systolic function was normal.  Left Atrium: Normal left atrial size.  Pericardium: There is no evidence of pericardial effusion.  Mitral Valve: Mild mitral valve regurgitation is seen.    Admitted to rehab for Rehab of gait disorder secondary to multiple intracranial vascular territory infarcts, likely 2/2 thromboembolic event in setting of intracranial vasculopathy, and right ankle fracture of lateral malleolus  #CVA; Acute Left temporal, L parietal, and L insular.. She is continued with asa , plavix  and High dose  Statin   - Continue DAPT 81mg ASA QD, Plavix 75mg QD, high dose statin 80mg atorvastatin 80mg qhs. - maintain SBP goals 140-180.  family refused GERARDO , she has a Mcot placed on 8/17 . TTE 8/10 revealed: normal LV function with EF 65%, mild MVR. no PFO or thrombus    #Aphasia  . getting   Speech therapy and / Neuropsychology evaluations  daily   - improving    #Azotemia    BUN 30 (8/21/2023), encouare oral hydartion s/i IV fluids  few days .  Encouraged oral hydration     #HTN  stable on  Norvasc 10mg QD      #R lateral malleolus ankle fracture, weight bearing with CaM  boot ,  Pain control  with  tylenol 650mg q6hr PRN  - followup with orthopedics, Dr. Enoc Perez in 2 weeks orthopedics.     # E.coli UTI , s/p  antibiotic    DVT ppx: SQH  Zofran for nausea  GI/bowel ppx: protonix, senna qhs  Diet: DASH/TLC diet  Code status: Full Code  -Skin: No active issues at this time  -

## 2023-08-23 NOTE — PROGRESS NOTE ADULT - SUBJECTIVE AND OBJECTIVE BOX
Patient is a 78y old  Female who presents with a chief complaint of Acute rehabilitation of gait disorder secondary to multiple intracranial vascular territory infarcts, likely 2/2 thromboembolic event in setting of intracranial vasculopathy and right ankle fracture of lateral malleolus (18 Aug 2023 11:55)      HPI:  Patient is 79 yo female with hx of AMS in the past for UTI who has not seen a physician for over 20 yrs presenting with AMS that started yesterday, and progressively worsening. As per son, patient has been doing ok, yesterday morning, and around noon time, she started to get confused.  Offers no other complaints.  At baseline pt has no cognitive impairment per family. Significant labs/vitals for BP at 213/94 on presentation, WBC 15, nitrite+ on UA.    Pt evaluated by neurology and ICU and found to have NIHSS of 4 due to 2 questions and aphasia on initial presentation on 8/10/23.    CT Head Non-Contrast   - No definite acute intracranial pathology. Moderate sized chronic left occipital infarct. Multiple age-indeterminate lacunar infarcts along the right coronal  radiata.     - Indeterminate confluent hypodensity in the right frontal subcortical white matter without mass effect. Nonspecific and differential can include focus of gliosis, demyelination,    chronic microvascular changes amongst others.     CT Angio Head w/ IV Cont:    - Focal occlusion in the proximal inferior M2 branch of the left MCA with diminutive enhancement in the distal branches.   - Irregular stenosis involving multiple M2 branches of the right MCA and distal right TUCKER.   - Multifocal stenosis in the bilateral posterior cerebral arteries.    MRI reveals Acute infarcts in the left temporal lobe, left parietal lobe, and left insular cortex. Patchy subacute/acute infarct in the right frontal subcortical region superimposed on small chronic lacunar infarcts. Mild chronic microvascular ischemic changes and scattered chronic infarcts.     Pt was seen by orthopedics s/p fall with ankle pain and found to have R lateral malleolus ankle fracture to be conservatively managed with CAM boot and be WBAT. Pt was initially planned for GERARDO but patient/family refused GERARDO and loop recorder placement. MCOT set up on 8/17/2023.    obtained TTE 8/10:    1. Normal global left ventricular systolic function.   2. LV Ejection Fraction by Martinez's Method with a biplane EF of 65 %.   3. Normal left atrial size.   4. There is no evidence of pericardial effusion.   5. Mild mitral valve regurgitation.    The patient was evaluated by the PM&R team once medically stable. The patient was found to have functional limitation in terms of muscle strength, endurance, physical mobility, and ability to carry out activities of daily living (self care, transfers, and ambulation). The patient was started on a course of bedside therapy, She currently requires CG for bed mobility, min assist for transfers and to ambulate with a RW 30 feet in right CAM Boot WBAT, and min assist for UE dessing, mod assist for LE dressing. The pt is motivated and able to start 3 hours of therapy  daily for 6-7 days a week. the patient was deemed to be a good candidate for admission for acute inpatient rehab. The patient was admitted to acute inpatient rehab on 8/17/23.   (17 Aug 2023 10:19)      TODAY'S SUBJECTIVE & REVIEW OF SYMPTOMS:   Pt seen and examined at bedside.  No overnight events.  No new complaints.  Voiding urine/stool spontaneously.  VS reviewed.  Tolerating therapies well.    Pt has MCOT since 8/17/2023.    Current level of functioning  Supervision and touch assist with bed mobility and transfers; ambulated 150 ft using RW and touch assist; 20 ft using RW with close supervision.    Review of Systems:   Constitutional:    [ x  ] WNL           [   ] poor appetite   [   ] insomnia   [   ] tired   Cardio:                [x   ] WNL           [   ] CP   [   ] SARMIENTO   [   ] palpitations               Resp:                   [ x  ] WNL           [   ] SOB   [   ] cough   [   ] wheezing   GI:                        [ x  ] WNL           [   ] constipation  [   ] diarrhea   [   ] abdominal pain   [   ] nausea   [   ] emesis                                :                      [   ] WNL           [   ] ZAVALA  [   ] dysuria   [   ] difficulty voiding        [ x ]  new urinary incontinence     Endo:                   [x   ] WNL          [   ] polyuria   [   ] temperature intolerance                 Skin:                     [x   ] WNL          [   ] pain   [   ] wound   [   ] rash   MSK:                    [    ] WNL          [   ] muscle pain   [   x] joint pain/ stiffness right ankle   [   ] muscle tenderness   [   ] swelling   Neuro:                 [   ] WNL [ x ] per HPI          [   ] HA   [   ] change in vision   [   ] tremor   [   ] weakness   [   ]dysphagia               Physical Exam:   Vital Signs (24 Hrs):  T(C): 36.1 (08-23-23 @ 05:48), Max: 36.4 (08-22-23 @ 20:41)  HR: 79 (08-23-23 @ 05:48) (68 - 79)  BP: 120/71 (08-23-23 @ 05:48) (120/71 - 146/63)  RR: 18 (08-23-23 @ 05:48) (18 - 18)  SpO2: --  Wt(kg): --  Daily     Daily     I&O's Summary        General:[x   ] NAD, Resting Comfortable,   [   ] other:                                HEENT: [ x  ] NC/AT, EOMI, PERRL , Normal Conjunctivae,   [   ] other:  Cardio: [ x  ] RRR, no murmer,   [   ] other:                              Pulm: [ x  ] No Respiratory Distress,  Lungs CTAB,   [   ] other:                       Abdomen: [   ]ND/NT, Soft,   [   ] other:    : [ x  ] NO ZAVALA CATHETER, [   ] ZAVALA CATHETER- no meatal tear, no discharge, [   ] other:                                            MSK: [   ] No joint swelling, Full ROM,   [ x  ] other:  right lateral ankle swelling and tenderness                                       Ext: [ x  ]No C/C/E, No calf tenderness,   [   ]other:    Skin: [x   ]intact,   [   ] other:                                                                   Neurological Examination:  Cognitive: [    ] AAO x 3,   [ x   ]  other:  oriented to self and place, year with choices                                                              Attention:  [ x   ] intact,   [    ]  other:                            Memory: [    ] intact,    [  x  ]  other:   limited by aphasia  Mood/Affect: [   x ] wnl,    [    ]  other:                                                                             Communication: [    ]Fluent, no dysarthria, following commands:  [  x  ] other: good comprehension, mild to moderate nonfluent expressive impairment - improving  CN II - XII:  [  x  ] intact,  [    ] other:                                                                                        Motor:   RIGHT UE: [x   ] WNL,  [   ] other:  LEFT    UE: [ x  ] WNL,  [   ] other:  RIGHT LE: [x   ] WNL,  [   ] other:   LEFT    LE: [ x  ] WNL,  [   ] other:    Tone: [x    ] wnl,   [    ]  other:  DTRs: [ x  ]symmetric, [   ] other:  Coordination:   [    ] intact,   [  x  ] other:  mild decrease both legs                                                                         Sensory: [   x ] Intact to light touch,   [    ] other: Cognitive:           [   ] WNL           [ x  ]confusion  with recnet UTI, not baseline   Psych:                  [ x  ] WNL           [   ] hallucinations   [   ]agitation   [   ] delusion   [   ]depression    MEDICATIONS  (STANDING):  amLODIPine   Tablet 10 milliGRAM(s) Oral daily  aspirin enteric coated 81 milliGRAM(s) Oral daily  atorvastatin 80 milliGRAM(s) Oral at bedtime  chlorhexidine 2% Cloths 1 Application(s) Topical <User Schedule>  clopidogrel Tablet 75 milliGRAM(s) Oral daily  heparin   Injectable 5000 Unit(s) SubCutaneous every 12 hours  pantoprazole    Tablet 40 milliGRAM(s) Oral before breakfast  polyethylene glycol 3350 17 Gram(s) Oral two times a day  senna 2 Tablet(s) Oral at bedtime    MEDICATIONS  (PRN):  acetaminophen     Tablet .. 650 milliGRAM(s) Oral every 6 hours PRN Temp greater or equal to 38C (100.4F), Mild Pain (1 - 3)  bisacodyl 10 milliGRAM(s) Oral at bedtime PRN Constipation  ondansetron   Disintegrating Tablet 4 milliGRAM(s) Oral every 8 hours PRN Nausea and/or Vomiting      RECENT LABS/IMAGING                          14.0   9.73  )-----------( 253      ( 21 Aug 2023 07:06 )             41.4     08-21    142  |  109  |  30<H>  ----------------------------<  93  4.3   |  22  |  1.0    Ca    9.1      21 Aug 2023 07:06  Mg     2.2     08-21    TPro  6.2  /  Alb  3.7  /  TBili  0.7  /  DBili  x   /  AST  32  /  ALT  47<H>  /  AlkPhos  191<H>  08-21     Patient is a 78y old  Female who presents with a chief complaint of Acute rehabilitation of gait disorder secondary to multiple intracranial vascular territory infarcts, likely 2/2 thromboembolic event in setting of intracranial vasculopathy and right ankle fracture of lateral malleolus (18 Aug 2023 11:55)      HPI:  Patient is 79 yo female with hx of AMS in the past for UTI who has not seen a physician for over 20 yrs presenting with AMS that started yesterday, and progressively worsening. As per son, patient has been doing ok, yesterday morning, and around noon time, she started to get confused.  Offers no other complaints.  At baseline pt has no cognitive impairment per family. Significant labs/vitals for BP at 213/94 on presentation, WBC 15, nitrite+ on UA.    Pt evaluated by neurology and ICU and found to have NIHSS of 4 due to 2 questions and aphasia on initial presentation on 8/10/23.    CT Head Non-Contrast   - No definite acute intracranial pathology. Moderate sized chronic left occipital infarct. Multiple age-indeterminate lacunar infarcts along the right coronal  radiata.     - Indeterminate confluent hypodensity in the right frontal subcortical white matter without mass effect. Nonspecific and differential can include focus of gliosis, demyelination,    chronic microvascular changes amongst others.     CT Angio Head w/ IV Cont:    - Focal occlusion in the proximal inferior M2 branch of the left MCA with diminutive enhancement in the distal branches.   - Irregular stenosis involving multiple M2 branches of the right MCA and distal right TUCKER.   - Multifocal stenosis in the bilateral posterior cerebral arteries.    MRI reveals Acute infarcts in the left temporal lobe, left parietal lobe, and left insular cortex. Patchy subacute/acute infarct in the right frontal subcortical region superimposed on small chronic lacunar infarcts. Mild chronic microvascular ischemic changes and scattered chronic infarcts.     Pt was seen by orthopedics s/p fall with ankle pain and found to have R lateral malleolus ankle fracture to be conservatively managed with CAM boot and be WBAT. Pt was initially planned for GERARDO but patient/family refused GERARDO and loop recorder placement. MCOT set up on 8/17/2023.    obtained TTE 8/10:    1. Normal global left ventricular systolic function.   2. LV Ejection Fraction by Martinez's Method with a biplane EF of 65 %.   3. Normal left atrial size.   4. There is no evidence of pericardial effusion.   5. Mild mitral valve regurgitation.    The patient was evaluated by the PM&R team once medically stable. The patient was found to have functional limitation in terms of muscle strength, endurance, physical mobility, and ability to carry out activities of daily living (self care, transfers, and ambulation). The patient was started on a course of bedside therapy, She currently requires CG for bed mobility, min assist for transfers and to ambulate with a RW 30 feet in right CAM Boot WBAT, and min assist for UE dessing, mod assist for LE dressing. The pt is motivated and able to start 3 hours of therapy  daily for 6-7 days a week. the patient was deemed to be a good candidate for admission for acute inpatient rehab. The patient was admitted to acute inpatient rehab on 8/17/23.   (17 Aug 2023 10:19)      TODAY'S SUBJECTIVE & REVIEW OF SYMPTOMS:   Pt seen and examined at bedside.  No overnight events.  No new complaints.  Voiding urine/stool spontaneously.  VS reviewed.  Tolerating therapies well.    Pt has MCOT since 8/17/2023.    Current level of functioning  Supervision and touch assist with bed mobility and transfers; ambulated 150 ft using RW and touch assist; 20 ft using RW with close supervision.    Review of Systems:   Constitutional:    [ x  ] WNL           [   ] poor appetite   [   ] insomnia   [   ] tired   Cardio:                [x   ] WNL           [   ] CP   [   ] SARMIENTO   [   ] palpitations               Resp:                   [ x  ] WNL           [   ] SOB   [   ] cough   [   ] wheezing   GI:                        [ x  ] WNL           [   ] constipation  [   ] diarrhea   [   ] abdominal pain   [   ] nausea   [   ] emesis                                :                      [   ] WNL           [   ] ZAVALA  [   ] dysuria   [   ] difficulty voiding        [ x ]  new urinary incontinence     Endo:                   [x   ] WNL          [   ] polyuria   [   ] temperature intolerance                 Skin:                     [x   ] WNL          [   ] pain   [   ] wound   [   ] rash   MSK:                    [    ] WNL          [   ] muscle pain   [   x] joint pain/ stiffness right ankle   [   ] muscle tenderness   [   ] swelling   Neuro:                 [   ] WNL [ x ] per HPI          [   ] HA   [   ] change in vision   [   ] tremor   [   ] weakness   [   ]dysphagia               Physical Exam:   Vital Signs (24 Hrs):  T(C): 36.1 (08-23-23 @ 05:48), Max: 36.4 (08-22-23 @ 20:41)  HR: 79 (08-23-23 @ 05:48) (68 - 79)  BP: 120/71 (08-23-23 @ 05:48) (120/71 - 146/63)  RR: 18 (08-23-23 @ 05:48) (18 - 18)      General:[x   ] NAD, Resting Comfortable,   [   ] other:                                HEENT: [ x  ] NC/AT, EOMI, PERRL , Normal Conjunctivae,   [   ] other:  Cardio: [ x  ] RRR, no murmer,   [   ] other:                              Pulm: [ x  ] No Respiratory Distress,  Lungs CTAB,   [   ] other:                       Abdomen: [   ]ND/NT, Soft,   [   ] other:    : [ x  ] NO ZAVALA CATHETER, [   ] ZAVALA CATHETER- no meatal tear, no discharge, [   ] other:                                            MSK: [   ] No joint swelling, Full ROM,   [ x  ] other:  right lateral ankle swelling and tenderness                                       Ext: [ x  ]No C/C/E, No calf tenderness,   [   ]other:    Skin: [x   ]intact,   [   ] other:                                                                   Neurological Examination:  Cognitive: [    ] AAO x 3,   [ x   ]  other:  oriented to self and place, year with choices                                                              Attention:  [ x   ] intact,   [    ]  other:                            Memory: [    ] intact,    [  x  ]  other:   limited by aphasia  Mood/Affect: [   x ] wnl,    [    ]  other:                                                                             Communication: [    ]Fluent, no dysarthria, following commands:  [  x  ] other: good comprehension, mild to moderate nonfluent expressive impairment - improving  CN II - XII:  [  x  ] intact,  [    ] other:                                                                                        Motor:   RIGHT UE: [x   ] WNL,  [   ] other:  LEFT    UE: [ x  ] WNL,  [   ] other:  RIGHT LE: [x   ] WNL,  [   ] other:   LEFT    LE: [ x  ] WNL,  [   ] other:    Tone: [x    ] wnl,   [    ]  other:  DTRs: [ x  ]symmetric, [   ] other:  Coordination:   [    ] intact,   [  x  ] other:  mild decrease both legs                                                                         Sensory: [   x ] Intact to light touch,   [    ] other: Cognitive:           [   ] WNL           [ x  ]confusion  with recnet UTI, not baseline   Psych:                  [ x  ] WNL           [   ] hallucinations   [   ]agitation   [   ] delusion   [   ]depression    MEDICATIONS  (STANDING):  amLODIPine   Tablet 10 milliGRAM(s) Oral daily  aspirin enteric coated 81 milliGRAM(s) Oral daily  atorvastatin 80 milliGRAM(s) Oral at bedtime  chlorhexidine 2% Cloths 1 Application(s) Topical <User Schedule>  clopidogrel Tablet 75 milliGRAM(s) Oral daily  heparin   Injectable 5000 Unit(s) SubCutaneous every 12 hours  pantoprazole    Tablet 40 milliGRAM(s) Oral before breakfast  polyethylene glycol 3350 17 Gram(s) Oral two times a day  senna 2 Tablet(s) Oral at bedtime    MEDICATIONS  (PRN):  acetaminophen     Tablet .. 650 milliGRAM(s) Oral every 6 hours PRN Temp greater or equal to 38C (100.4F), Mild Pain (1 - 3)  bisacodyl 10 milliGRAM(s) Oral at bedtime PRN Constipation  ondansetron   Disintegrating Tablet 4 milliGRAM(s) Oral every 8 hours PRN Nausea and/or Vomiting      RECENT LABS/IMAGING                          14.0   9.73  )-----------( 253      ( 21 Aug 2023 07:06 )             41.4     08-21    142  |  109  |  30<H>  ----------------------------<  93  4.3   |  22  |  1.0    Ca    9.1      21 Aug 2023 07:06  Mg     2.2     08-21    TPro  6.2  /  Alb  3.7  /  TBili  0.7  /  DBili  x   /  AST  32  /  ALT  47<H>  /  AlkPhos  191<H>  08-21

## 2023-08-24 RX ADMIN — Medication 81 MILLIGRAM(S): at 12:08

## 2023-08-24 RX ADMIN — AMLODIPINE BESYLATE 10 MILLIGRAM(S): 2.5 TABLET ORAL at 05:30

## 2023-08-24 RX ADMIN — CHLORHEXIDINE GLUCONATE 1 APPLICATION(S): 213 SOLUTION TOPICAL at 05:30

## 2023-08-24 RX ADMIN — HEPARIN SODIUM 5000 UNIT(S): 5000 INJECTION INTRAVENOUS; SUBCUTANEOUS at 05:29

## 2023-08-24 RX ADMIN — POLYETHYLENE GLYCOL 3350 17 GRAM(S): 17 POWDER, FOR SOLUTION ORAL at 05:29

## 2023-08-24 RX ADMIN — PANTOPRAZOLE SODIUM 40 MILLIGRAM(S): 20 TABLET, DELAYED RELEASE ORAL at 06:11

## 2023-08-24 RX ADMIN — CLOPIDOGREL BISULFATE 75 MILLIGRAM(S): 75 TABLET, FILM COATED ORAL at 12:08

## 2023-08-24 RX ADMIN — POLYETHYLENE GLYCOL 3350 17 GRAM(S): 17 POWDER, FOR SOLUTION ORAL at 17:36

## 2023-08-24 RX ADMIN — HEPARIN SODIUM 5000 UNIT(S): 5000 INJECTION INTRAVENOUS; SUBCUTANEOUS at 17:36

## 2023-08-24 RX ADMIN — ATORVASTATIN CALCIUM 80 MILLIGRAM(S): 80 TABLET, FILM COATED ORAL at 21:34

## 2023-08-24 NOTE — PROGRESS NOTE ADULT - SUBJECTIVE AND OBJECTIVE BOX
Patient is a 78y old  Female who presents with a chief complaint of Acute rehabilitation of gait disorder secondary to multiple intracranial vascular territory infarcts, likely 2/2 thromboembolic event in setting of intracranial vasculopathy and right ankle fracture of lateral malleolus (18 Aug 2023 11:55)      HPI:  Patient is 77 yo female with hx of AMS in the past for UTI who has not seen a physician for over 20 yrs presenting with AMS that started yesterday, and progressively worsening. As per son, patient has been doing ok, yesterday morning, and around noon time, she started to get confused.  Offers no other complaints.  At baseline pt has no cognitive impairment per family. Significant labs/vitals for BP at 213/94 on presentation, WBC 15, nitrite+ on UA.    Pt evaluated by neurology and ICU and found to have NIHSS of 4 due to 2 questions and aphasia on initial presentation on 8/10/23.    CT Head Non-Contrast   - No definite acute intracranial pathology. Moderate sized chronic left occipital infarct. Multiple age-indeterminate lacunar infarcts along the right coronal  radiata.     - Indeterminate confluent hypodensity in the right frontal subcortical white matter without mass effect. Nonspecific and differential can include focus of gliosis, demyelination,    chronic microvascular changes amongst others.     CT Angio Head w/ IV Cont:    - Focal occlusion in the proximal inferior M2 branch of the left MCA with diminutive enhancement in the distal branches.   - Irregular stenosis involving multiple M2 branches of the right MCA and distal right TUCKER.   - Multifocal stenosis in the bilateral posterior cerebral arteries.    MRI reveals Acute infarcts in the left temporal lobe, left parietal lobe, and left insular cortex. Patchy subacute/acute infarct in the right frontal subcortical region superimposed on small chronic lacunar infarcts. Mild chronic microvascular ischemic changes and scattered chronic infarcts.     Pt was seen by orthopedics s/p fall with ankle pain and found to have R lateral malleolus ankle fracture to be conservatively managed with CAM boot and be WBAT. Pt was initially planned for GERARDO but patient/family refused GERARDO and loop recorder placement. MCOT set up on 8/17/2023.    obtained TTE 8/10:    1. Normal global left ventricular systolic function.   2. LV Ejection Fraction by Martinez's Method with a biplane EF of 65 %.   3. Normal left atrial size.   4. There is no evidence of pericardial effusion.   5. Mild mitral valve regurgitation.    The patient was evaluated by the PM&R team once medically stable. The patient was found to have functional limitation in terms of muscle strength, endurance, physical mobility, and ability to carry out activities of daily living (self care, transfers, and ambulation). The patient was started on a course of bedside therapy, She currently requires CG for bed mobility, min assist for transfers and to ambulate with a RW 30 feet in right CAM Boot WBAT, and min assist for UE dessing, mod assist for LE dressing. The pt is motivated and able to start 3 hours of therapy  daily for 6-7 days a week. the patient was deemed to be a good candidate for admission for acute inpatient rehab. The patient was admitted to acute inpatient rehab on 8/17/23.   (17 Aug 2023 10:19)      TODAY'S SUBJECTIVE & REVIEW OF SYMPTOMS:   Pt seen and examined at bedside.  No overnight events.  No new complaints.  Vitals reviewed. Denies pain.    Pt has MCOT since 8/17/2023.    Current level of functioning  Supervision and touch assist with bed mobility and transfers; ambulated 150 ft using RW and touch assist; 20 ft using RW with close supervision.    Review of Systems:   Constitutional:    [ x  ] WNL           [   ] poor appetite   [   ] insomnia   [   ] tired   Cardio:                [x   ] WNL           [   ] CP   [   ] SARMIENTO   [   ] palpitations               Resp:                   [ x  ] WNL           [   ] SOB   [   ] cough   [   ] wheezing   GI:                        [ x  ] WNL           [   ] constipation  [   ] diarrhea   [   ] abdominal pain   [   ] nausea   [   ] emesis                                :                      [   ] WNL           [   ] ZAVALA  [   ] dysuria   [   ] difficulty voiding        [ x ]  urinary incontinence at times   Endo:                   [x   ] WNL          [   ] polyuria   [   ] temperature intolerance                 Skin:                     [x   ] WNL          [   ] pain   [   ] wound   [   ] rash   MSK:                    [    ] WNL          [   ] muscle pain   [   x] joint pain/ stiffness right ankle   [   ] muscle tenderness   [   ] swelling   Neuro:                 [   ] WNL [ x ] per HPI          [   ] HA   [   ] change in vision   [   ] tremor   [   ] weakness   [   ]dysphagia             Psych:                     [ x ] mood/ affect WNL  Physical Exam:     Vital Signs Last 24 Hrs  T(C): 36.8 (24 Aug 2023 14:19), Max: 37 (24 Aug 2023 05:30)  T(F): 98.3 (24 Aug 2023 14:19), Max: 98.6 (24 Aug 2023 05:30)  HR: 73 (24 Aug 2023 14:19) (71 - 74)  BP: 139/63 (24 Aug 2023 14:19) (128/59 - 139/63)  BP(mean): 85 (24 Aug 2023 05:30) (85 - 85)  RR: 18 (24 Aug 2023 14:19) (18 - 18)  SpO2: --      General:[x   ] NAD, Resting Comfortable,   [   ] other:                                HEENT: [ x  ] NC/AT, EOMI, PERRL , Normal Conjunctivae,   [   ] other:  Cardio: [ x  ] RRR, no murmer,   [   ] other:                              Pulm: [ x  ] No Respiratory Distress,  Lungs CTAB,   [   ] other:                       Abdomen: [   ]ND/NT, Soft,   [   ] other:    : [ x  ] NO ZAVALA CATHETER, [   ] ZAVALA CATHETER- no meatal tear, no discharge, [   ] other:                                            MSK: [   ] No joint swelling, Full ROM,   [ x  ] other:  right lateral ankle swelling and tenderness                                       Ext: [ x  ]No C/C/E, No calf tenderness,   [   ]other:    Skin: [x   ]intact,   [   ] other:                                                                   Neurological Examination:  Cognitive: [    ] AAO x 3,   [ x   ]  other:  oriented to self and place, year with choices (impacted by aphasia)                                                            Attention:  [ x   ] intact,   [    ]  other:                            Memory: [    ] intact,    [  x  ]  other:   limited by aphasia  Mood/Affect: [   x ] wnl,    [    ]  other:                                                                             Communication: [    ]Fluent, no dysarthria, following commands:  [  x  ] other: good comprehension, mild to moderate nonfluent expressive impairment - improving  CN II - XII:  [  x  ] intact,  [    ] other:                                                                                        Motor:   RIGHT UE: [x   ] WNL,  [   ] other:  LEFT    UE: [ x  ] WNL,  [   ] other:  RIGHT LE: [x   ] WNL,  [   ] other:   LEFT    LE: [ x  ] WNL,  [   ] other:    Tone: [x    ] wnl,   [    ]  other:  DTRs: [ x  ]symmetric, [   ] other:  Coordination:   [    ] intact,   [  x  ] other:  mild decrease both legs                                                                         Sensory: [   x ] Intact to light touch,   [    ] other: Cognitive:           [   ] WNL           [ x  ]confusion  with recnet UTI, not baseline    MEDICATIONS  (STANDING):  amLODIPine   Tablet 10 milliGRAM(s) Oral daily  aspirin enteric coated 81 milliGRAM(s) Oral daily  atorvastatin 80 milliGRAM(s) Oral at bedtime  chlorhexidine 2% Cloths 1 Application(s) Topical <User Schedule>  clopidogrel Tablet 75 milliGRAM(s) Oral daily  heparin   Injectable 5000 Unit(s) SubCutaneous every 12 hours  pantoprazole    Tablet 40 milliGRAM(s) Oral before breakfast  polyethylene glycol 3350 17 Gram(s) Oral two times a day  senna 2 Tablet(s) Oral at bedtime    MEDICATIONS  (PRN):  acetaminophen     Tablet .. 650 milliGRAM(s) Oral every 6 hours PRN Temp greater or equal to 38C (100.4F), Mild Pain (1 - 3)  bisacodyl 10 milliGRAM(s) Oral at bedtime PRN Constipation  ondansetron   Disintegrating Tablet 4 milliGRAM(s) Oral every 8 hours PRN Nausea and/or Vomiting        RECENT LABS/IMAGING                          14.0   9.73  )-----------( 253      ( 21 Aug 2023 07:06 )             41.4     08-21    142  |  109  |  30<H>  ----------------------------<  93  4.3   |  22  |  1.0    Ca    9.1      21 Aug 2023 07:06  Mg     2.2     08-21    TPro  6.2  /  Alb  3.7  /  TBili  0.7  /  DBili  x   /  AST  32  /  ALT  47<H>  /  AlkPhos  191<H>  08-21

## 2023-08-24 NOTE — PROGRESS NOTE ADULT - ASSESSMENT
ASSESSMENT/PLAN  78 year old female with no known medical history (does not see a health care provider for past 20 years) brought in by son to the emergency room for confusion. As per son, patient was noted  during a phone conversation to be confused, and when he got home the confusion was worse. Unsure of last time of known welll. evaluated by neurology and ICU. Pt presented with aphasia. taken to ICU for further neuro monitoring.  labs showed leukocytosis with + nitrite on UA), s/p fall pt with R ankle fracture seen by ortho and managed conservatively with CAM boot.  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       #Rehab of gait disorder secondary to multiple intracranial vascular territory infarcts, likely 2/2 thromboembolic event in setting of intracranial vasculopathy, and right ankle fracture of lateral malleolus  #CVA; Acute Left temporal, L parietal, and L insular  - Continue DAPT 81mg ASA QD, Plavix 75mg QD, high dose statin 80mg atorvastatin 80mg qhs  - maintain SBP goals 140-180.   - continue PT/OT/SLP   - GERARDO planned for 8/14 -- refused by patient and family.   - TTE 8/10 revealed: normal LV function with EF 65%, mild MVR. no PFO or thrombus  - MCOT placed (8/17/2023)    #Aphasia  - ST/ Neuropsych following  - improving    #Azotemia    BUN 30 (8/21/2023)  - promote oral hydration   - given IVF (8/17/2023) and BUN improved to 29  - appears to be chronic, stable    #HTN  - SBP goal per neuro 140-180  - c/w Norvasc 10mg QD  - 130- 150  - stable    #R lateral malleolus ankle fracture  - WBAT in CAM boot  - conservative non-surgical management  - Pain control tylenol 650mg q6hr PRN  - followup with orthopedics, Dr. Enoc Perez in 2 weeks orthopedics.     #s/p E.coli UTI  - completed antibiotic  - asymptomatic    #Misc  DVT ppx: SQH  Zofran for nausea  GI/bowel ppx: protonix, senna qhs  Diet: DASH/TLC diet  Code status: Full Code  -Skin: No active issues at this time    Precautions / PROPHYLAXIS:      - Falls, safety    - Ortho: Weight bearing status: WABT with CAM boot of RLE

## 2023-08-24 NOTE — PROGRESS NOTE ADULT - ASSESSMENT
Neuropsychology Follow Up   Treatment focused on: Neuropsychological Testing   Pain: No Location: N/A Ratin/10   Orientation: [facility]   Arousal Level: Alert   Behavior: Cooperative, friendly   Affect/Mood Range: [facility]    Needed: No  #: N/A   Attention: [facility]   Insight into illness/deficits: [facility]     Patient was seen for a session following cognitive problems, including language, and perceptual and verbal reasoning skills secondary to CVA.     Neuropsychological testing was completed and the following measures were given: WASI-selected subtests and [place] Naming Test (BNT).     Results:  Perceptual Reasoning: Patient’s perceptual reasoning, was poor on a test of pattern detection and visual reasoning (WASI Matrix Reasoning).     Verbal Reasoning: The patient exhibited difficulty on a task assessing the mental processing of verbal information, categorizing and conceptualizing information (WASI Similarities=7/45 points).     Language: Patient’s ability to utilize lexical retrieval abilities through visual confrontation naming was intact (BNT=53/60).     Summary:  Patient’s pattern of performance on neuropsychological testing domains is largely consistent with previous testing sessions, displaying mild difficulties with mental manipulation and categorizing verbal information. The plan is to provide the patient with appropriate resources to maintain cognitive skills.     Goals: Facilitate Positive Coping Cognitive Assessment   Plan: 1-3 times a week 30-60 minutes

## 2023-08-25 RX ADMIN — ATORVASTATIN CALCIUM 80 MILLIGRAM(S): 80 TABLET, FILM COATED ORAL at 21:27

## 2023-08-25 RX ADMIN — AMLODIPINE BESYLATE 10 MILLIGRAM(S): 2.5 TABLET ORAL at 06:14

## 2023-08-25 RX ADMIN — PANTOPRAZOLE SODIUM 40 MILLIGRAM(S): 20 TABLET, DELAYED RELEASE ORAL at 06:14

## 2023-08-25 RX ADMIN — HEPARIN SODIUM 5000 UNIT(S): 5000 INJECTION INTRAVENOUS; SUBCUTANEOUS at 06:14

## 2023-08-25 RX ADMIN — CLOPIDOGREL BISULFATE 75 MILLIGRAM(S): 75 TABLET, FILM COATED ORAL at 12:15

## 2023-08-25 RX ADMIN — Medication 81 MILLIGRAM(S): at 12:16

## 2023-08-25 RX ADMIN — HEPARIN SODIUM 5000 UNIT(S): 5000 INJECTION INTRAVENOUS; SUBCUTANEOUS at 17:36

## 2023-08-25 NOTE — PROGRESS NOTE ADULT - SUBJECTIVE AND OBJECTIVE BOX
Patient is a 78y old  Female who presents with a chief complaint of Acute rehabilitation of gait disorder secondary to multiple intracranial vascular territory infarcts, likely 2/2 thromboembolic event in setting of intracranial vasculopathy and right ankle fracture of lateral malleolus (18 Aug 2023 11:55)      HPI:  Patient is 77 yo female with hx of AMS in the past for UTI who has not seen a physician for over 20 yrs presenting with AMS that started yesterday, and progressively worsening. As per son, patient has been doing ok, yesterday morning, and around noon time, she started to get confused.  Offers no other complaints.  At baseline pt has no cognitive impairment per family. Significant labs/vitals for BP at 213/94 on presentation, WBC 15, nitrite+ on UA.    Pt evaluated by neurology and ICU and found to have NIHSS of 4 due to 2 questions and aphasia on initial presentation on 8/10/23.    CT Head Non-Contrast   - No definite acute intracranial pathology. Moderate sized chronic left occipital infarct. Multiple age-indeterminate lacunar infarcts along the right coronal  radiata.     - Indeterminate confluent hypodensity in the right frontal subcortical white matter without mass effect. Nonspecific and differential can include focus of gliosis, demyelination,    chronic microvascular changes amongst others.     CT Angio Head w/ IV Cont:    - Focal occlusion in the proximal inferior M2 branch of the left MCA with diminutive enhancement in the distal branches.   - Irregular stenosis involving multiple M2 branches of the right MCA and distal right TUCKER.   - Multifocal stenosis in the bilateral posterior cerebral arteries.    MRI reveals Acute infarcts in the left temporal lobe, left parietal lobe, and left insular cortex. Patchy subacute/acute infarct in the right frontal subcortical region superimposed on small chronic lacunar infarcts. Mild chronic microvascular ischemic changes and scattered chronic infarcts.     Pt was seen by orthopedics s/p fall with ankle pain and found to have R lateral malleolus ankle fracture to be conservatively managed with CAM boot and be WBAT. Pt was initially planned for GERARDO but patient/family refused GERARDO and loop recorder placement. MCOT set up on 8/17/2023.    obtained TTE 8/10:    1. Normal global left ventricular systolic function.   2. LV Ejection Fraction by Martinez's Method with a biplane EF of 65 %.   3. Normal left atrial size.   4. There is no evidence of pericardial effusion.   5. Mild mitral valve regurgitation.    The patient was evaluated by the PM&R team once medically stable. The patient was found to have functional limitation in terms of muscle strength, endurance, physical mobility, and ability to carry out activities of daily living (self care, transfers, and ambulation). The patient was started on a course of bedside therapy, She currently requires CG for bed mobility, min assist for transfers and to ambulate with a RW 30 feet in right CAM Boot WBAT, and min assist for UE dessing, mod assist for LE dressing. The pt is motivated and able to start 3 hours of therapy  daily for 6-7 days a week. the patient was deemed to be a good candidate for admission for acute inpatient rehab. The patient was admitted to acute inpatient rehab on 8/17/23.   (17 Aug 2023 10:19)      TODAY'S SUBJECTIVE & REVIEW OF SYMPTOMS:   Pt seen and examined at bedside.  No overnight events.  No new complaints.  Vitals reviewed. Denies pain.    Pt has MCOT since 8/17/2023.    Current level of functioning  Supervision and touch assist with bed mobility and transfers; ambulated 150 ft using RW and touch assist; 20 ft using RW with close supervision.    Review of Systems:   Constitutional:    [ x  ] WNL           [   ] poor appetite   [   ] insomnia   [   ] tired   Cardio:                [x   ] WNL           [   ] CP   [   ] SARMIENTO   [   ] palpitations               Resp:                   [ x  ] WNL           [   ] SOB   [   ] cough   [   ] wheezing   GI:                        [ x  ] WNL           [   ] constipation  [   ] diarrhea   [   ] abdominal pain   [   ] nausea   [   ] emesis                                :                      [   ] WNL           [   ] ZAVALA  [   ] dysuria   [   ] difficulty voiding        [ x ]  urinary incontinence at times   Endo:                   [x   ] WNL          [   ] polyuria   [   ] temperature intolerance                 Skin:                     [x   ] WNL          [   ] pain   [   ] wound   [   ] rash   MSK:                    [    ] WNL          [   ] muscle pain   [   x] joint pain/ stiffness right ankle   [   ] muscle tenderness   [   ] swelling   Neuro:                 [   ] WNL [ x ] per HPI          [   ] HA   [   ] change in vision   [   ] tremor   [   ] weakness   [   ]dysphagia             Psych:                     [ x ] mood/ affect WNL      Physical Exam:     Vital Signs Last 24 Hrs  T(C): 36.9 (25 Aug 2023 14:48), Max: 36.9 (25 Aug 2023 14:48)  T(F): 98.4 (25 Aug 2023 14:48), Max: 98.4 (25 Aug 2023 14:48)  HR: 75 (25 Aug 2023 14:48) (70 - 75)  BP: 144/70 (25 Aug 2023 14:48) (120/59 - 144/70)  BP(mean): 83 (25 Aug 2023 05:37) (83 - 87)  RR: 18 (25 Aug 2023 14:48) (18 - 18)  SpO2: --    General:[x   ] NAD, Resting Comfortable,   [   ] other:                                HEENT: [ x  ] NC/AT, EOMI, PERRL , Normal Conjunctivae,   [   ] other:  Cardio: [ x  ] RRR, no murmer,   [   ] other:                              Pulm: [ x  ] No Respiratory Distress,  Lungs CTAB,   [   ] other:                       Abdomen: [   ]ND/NT, Soft,   [   ] other:    : [ x  ] NO ZAVALA CATHETER, [   ] ZAVALA CATHETER- no meatal tear, no discharge, [   ] other:                                            MSK: [   ] No joint swelling, Full ROM,   [ x  ] other:  right lateral ankle swelling and tenderness                                       Ext: [ x  ]No C/C/E, No calf tenderness,   [   ]other:    Skin: [x   ]intact,   [   ] other:                                                                   Neurological Examination:  Cognitive: [    ] AAO x 3,   [ x   ]  other:  oriented to self and place, year with choices (impacted by aphasia)                                                            Attention:  [ x   ] intact,   [    ]  other:                            Memory: [    ] intact,    [  x  ]  other:   limited by aphasia  Mood/Affect: [   x ] wnl,    [    ]  other:                                                                             Communication: [    ]Fluent, no dysarthria, following commands:  [  x  ] other: good comprehension, mild to moderate nonfluent expressive impairment - improving  CN II - XII:  [  x  ] intact,  [    ] other:                                                                                        Motor:   RIGHT UE: [x   ] WNL,  [   ] other:  LEFT    UE: [ x  ] WNL,  [   ] other:  RIGHT LE: [x   ] WNL,  [   ] other:   LEFT    LE: [ x  ] WNL,  [   ] other:    Tone: [x    ] wnl,   [    ]  other:  DTRs: [ x  ]symmetric, [   ] other:  Coordination:   [    ] intact,   [  x  ] other:  mild decrease both legs                                                                         Sensory: [   x ] Intact to light touch,   [    ] other: Cognitive:           [   ] WNL           [ x  ]confusion  with recnet UTI, not baseline    MEDICATIONS  (STANDING):  amLODIPine   Tablet 10 milliGRAM(s) Oral daily  aspirin enteric coated 81 milliGRAM(s) Oral daily  atorvastatin 80 milliGRAM(s) Oral at bedtime  chlorhexidine 2% Cloths 1 Application(s) Topical <User Schedule>  clopidogrel Tablet 75 milliGRAM(s) Oral daily  heparin   Injectable 5000 Unit(s) SubCutaneous every 12 hours  pantoprazole    Tablet 40 milliGRAM(s) Oral before breakfast  polyethylene glycol 3350 17 Gram(s) Oral two times a day  senna 2 Tablet(s) Oral at bedtime    MEDICATIONS  (PRN):  acetaminophen     Tablet .. 650 milliGRAM(s) Oral every 6 hours PRN Temp greater or equal to 38C (100.4F), Mild Pain (1 - 3)  bisacodyl 10 milliGRAM(s) Oral at bedtime PRN Constipation  ondansetron   Disintegrating Tablet 4 milliGRAM(s) Oral every 8 hours PRN Nausea and/or Vomiting        RECENT LABS/IMAGING                          14.0   9.73  )-----------( 253      ( 21 Aug 2023 07:06 )             41.4     08-21    142  |  109  |  30<H>  ----------------------------<  93  4.3   |  22  |  1.0    Ca    9.1      21 Aug 2023 07:06  Mg     2.2     08-21    TPro  6.2  /  Alb  3.7  /  TBili  0.7  /  DBili  x   /  AST  32  /  ALT  47<H>  /  AlkPhos  191<H>  08-21

## 2023-08-25 NOTE — PROGRESS NOTE ADULT - ASSESSMENT
ASSESSMENT/PLAN  78 year old female with no known medical history (does not see a health care provider for past 20 years) brought in by son to the emergency room for confusion. As per son, patient was noted  during a phone conversation to be confused, and when he got home the confusion was worse. Unsure of last time of known welll. evaluated by neurology and ICU. Pt presented with aphasia. taken to ICU for further neuro monitoring.  labs showed leukocytosis with + nitrite on UA), s/p fall pt with R ankle fracture seen by ortho and managed conservatively with CAM boot.  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       #Rehab of gait disorder secondary to multiple intracranial vascular territory infarcts, likely 2/2 thromboembolic event in setting of intracranial vasculopathy, and right ankle fracture of lateral malleolus  #CVA; Acute Left temporal, L parietal, and L insular  - Continue DAPT 81mg ASA QD, Plavix 75mg QD, high dose statin 80mg atorvastatin 80mg qhs  - maintain SBP goals 140-180.   - continue PT/OT/SLP   - GERARDO planned for 8/14 -- refused by patient and family.   - TTE 8/10 revealed: normal LV function with EF 65%, mild MVR. no PFO or thrombus  - MCOT placed (8/17/2023)  - Making gains in mobility and speech. Continue acute rehab program.    #Aphasia  - ST/ Neuropsych following  - improving    #Azotemia    BUN 30 (8/21/2023)  - promote oral hydration   - given IVF (8/17/2023) and BUN improved to 29  - appears to be chronic, stable  - recheck in a few days    #HTN  - SBP goal per neuro 140-180  - c/w Norvasc 10mg QD  - 130- 150  - stable    #R lateral malleolus ankle fracture  - WBAT in CAM boot  - conservative non-surgical management  - Pain control tylenol 650mg q6hr PRN  - followup with orthopedics, Dr. Enoc Perez in 2 weeks orthopedics.     #s/p E.coli UTI  - completed antibiotic  - asymptomatic    #Misc  DVT ppx: SQH  Zofran for nausea  GI/bowel ppx: protonix, senna qhs  Diet: DASH/TLC diet  Code status: Full Code  -Skin: No active issues at this time    Precautions / PROPHYLAXIS:      - Falls, safety    - Ortho: Weight bearing status: WABT with CAM boot of RLE

## 2023-08-26 RX ADMIN — HEPARIN SODIUM 5000 UNIT(S): 5000 INJECTION INTRAVENOUS; SUBCUTANEOUS at 06:22

## 2023-08-26 RX ADMIN — Medication 81 MILLIGRAM(S): at 11:35

## 2023-08-26 RX ADMIN — PANTOPRAZOLE SODIUM 40 MILLIGRAM(S): 20 TABLET, DELAYED RELEASE ORAL at 06:22

## 2023-08-26 RX ADMIN — SENNA PLUS 2 TABLET(S): 8.6 TABLET ORAL at 22:12

## 2023-08-26 RX ADMIN — ATORVASTATIN CALCIUM 80 MILLIGRAM(S): 80 TABLET, FILM COATED ORAL at 22:12

## 2023-08-26 RX ADMIN — CLOPIDOGREL BISULFATE 75 MILLIGRAM(S): 75 TABLET, FILM COATED ORAL at 11:35

## 2023-08-26 RX ADMIN — AMLODIPINE BESYLATE 10 MILLIGRAM(S): 2.5 TABLET ORAL at 06:22

## 2023-08-26 RX ADMIN — HEPARIN SODIUM 5000 UNIT(S): 5000 INJECTION INTRAVENOUS; SUBCUTANEOUS at 17:10

## 2023-08-26 NOTE — PROGRESS NOTE ADULT - SUBJECTIVE AND OBJECTIVE BOX
Patient is a 78y old  Female who presents with a chief complaint of Acute rehabilitation of gait disorder secondary to multiple intracranial vascular territory infarcts, likely 2/2 thromboembolic event in setting of intracranial vasculopathy and right ankle fracture of lateral malleolus (18 Aug 2023 11:55)      HPI:  Patient is 77 yo female with hx of AMS in the past for UTI who has not seen a physician for over 20 yrs presenting with AMS that started yesterday, and progressively worsening. As per son, patient has been doing ok, yesterday morning, and around noon time, she started to get confused.  Offers no other complaints.  At baseline pt has no cognitive impairment per family. Significant labs/vitals for BP at 213/94 on presentation, WBC 15, nitrite+ on UA.    Pt evaluated by neurology and ICU and found to have NIHSS of 4 due to 2 questions and aphasia on initial presentation on 8/10/23.    CT Head Non-Contrast   - No definite acute intracranial pathology. Moderate sized chronic left occipital infarct. Multiple age-indeterminate lacunar infarcts along the right coronal  radiata.     - Indeterminate confluent hypodensity in the right frontal subcortical white matter without mass effect. Nonspecific and differential can include focus of gliosis, demyelination,    chronic microvascular changes amongst others.     CT Angio Head w/ IV Cont:    - Focal occlusion in the proximal inferior M2 branch of the left MCA with diminutive enhancement in the distal branches.   - Irregular stenosis involving multiple M2 branches of the right MCA and distal right TUCKER.   - Multifocal stenosis in the bilateral posterior cerebral arteries.    MRI reveals Acute infarcts in the left temporal lobe, left parietal lobe, and left insular cortex. Patchy subacute/acute infarct in the right frontal subcortical region superimposed on small chronic lacunar infarcts. Mild chronic microvascular ischemic changes and scattered chronic infarcts.     Pt was seen by orthopedics s/p fall with ankle pain and found to have R lateral malleolus ankle fracture to be conservatively managed with CAM boot and be WBAT. Pt was initially planned for GERARDO but patient/family refused GERARDO and loop recorder placement. MCOT set up on 8/17/2023.    obtained TTE 8/10:    1. Normal global left ventricular systolic function.   2. LV Ejection Fraction by Martinez's Method with a biplane EF of 65 %.   3. Normal left atrial size.   4. There is no evidence of pericardial effusion.   5. Mild mitral valve regurgitation.    The patient was evaluated by the PM&R team once medically stable. The patient was found to have functional limitation in terms of muscle strength, endurance, physical mobility, and ability to carry out activities of daily living (self care, transfers, and ambulation). The patient was started on a course of bedside therapy, She currently requires CG for bed mobility, min assist for transfers and to ambulate with a RW 30 feet in right CAM Boot WBAT, and min assist for UE dessing, mod assist for LE dressing. The pt is motivated and able to start 3 hours of therapy  daily for 6-7 days a week. the patient was deemed to be a good candidate for admission for acute inpatient rehab. The patient was admitted to acute inpatient rehab on 8/17/23.   (17 Aug 2023 10:19)      TODAY'S SUBJECTIVE & REVIEW OF SYMPTOMS:   Pt seen and examined at bedside.  No overnight events.  No new complaints.  Vitals reviewed. Denies pain.    Pt has MCOT since 8/17/2023.    Current level of functioning  Supervision and touch assist with bed mobility and transfers; ambulated 150 ft using RW and touch assist; 20 ft using RW with close supervision.    Review of Systems:   Constitutional:    [ x  ] WNL           [   ] poor appetite   [   ] insomnia   [   ] tired   Cardio:                [x   ] WNL           [   ] CP   [   ] SARMIENTO   [   ] palpitations               Resp:                   [ x  ] WNL           [   ] SOB   [   ] cough   [   ] wheezing   GI:                        [ x  ] WNL           [   ] constipation  [   ] diarrhea   [   ] abdominal pain   [   ] nausea   [   ] emesis                                :                      [   ] WNL           [   ] ZAVALA  [   ] dysuria   [   ] difficulty voiding        [ x ]  urinary incontinence at times   Endo:                   [x   ] WNL          [   ] polyuria   [   ] temperature intolerance                 Skin:                     [x   ] WNL          [   ] pain   [   ] wound   [   ] rash   MSK:                    [    ] WNL          [   ] muscle pain   [   x] joint pain/ stiffness right ankle   [   ] muscle tenderness   [   ] swelling   Neuro:                 [   ] WNL [ x ] per HPI          [   ] HA   [   ] change in vision   [   ] tremor   [   ] weakness   [   ]dysphagia             Psych:                     [ x ] mood/ affect WNL      Physical Exam:     Vital Signs (24 Hrs):  T(C): 36.7 (08-26-23 @ 05:32), Max: 36.9 (08-25-23 @ 14:48)  HR: 67 (08-26-23 @ 05:32) (67 - 75)  BP: 131/60 (08-26-23 @ 05:32) (131/60 - 144/70)  RR: 20 (08-26-23 @ 05:32) (18 - 20)  SpO2: --  Wt(kg): --  Daily     Daily     I&O's Summary      General:[x   ] NAD, Resting Comfortable,   [   ] other:                                HEENT: [ x  ] NC/AT, EOMI, PERRL , Normal Conjunctivae,   [   ] other:  Cardio: [ x  ] RRR, no murmer,   [   ] other:                              Pulm: [ x  ] No Respiratory Distress,  Lungs CTAB,   [   ] other:                       Abdomen: [   ]ND/NT, Soft,   [   ] other:    : [ x  ] NO ZAVALA CATHETER, [   ] ZAVALA CATHETER- no meatal tear, no discharge, [   ] other:                                            MSK: [   ] No joint swelling, Full ROM,   [ x  ] other:  right lateral ankle swelling and tenderness                                       Ext: [ x  ]No C/C/E, No calf tenderness,   [   ]other:    Skin: [x   ]intact,   [   ] other:                                                                   Neurological Examination:  Cognitive: [    ] AAO x 3,   [ x   ]  other:  oriented to self and place, year with choices (impacted by aphasia)                                                            Attention:  [ x   ] intact,   [    ]  other:                            Memory: [    ] intact,    [  x  ]  other:   limited by aphasia  Mood/Affect: [   x ] wnl,    [    ]  other:                                                                             Communication: [    ]Fluent, no dysarthria, following commands:  [  x  ] other: good comprehension, mild to moderate nonfluent expressive impairment - improving  CN II - XII:  [  x  ] intact,  [    ] other:                                                                                        Motor:   RIGHT UE: [x   ] WNL,  [   ] other:  LEFT    UE: [ x  ] WNL,  [   ] other:  RIGHT LE: [x   ] WNL,  [   ] other:   LEFT    LE: [ x  ] WNL,  [   ] other:    Tone: [x    ] wnl,   [    ]  other:  DTRs: [ x  ]symmetric, [   ] other:  Coordination:   [    ] intact,   [  x  ] other:  mild decrease both legs                                                                         Sensory: [   x ] Intact to light touch,   [    ] other: Cognitive:           [   ] WNL           [ x  ]confusion  with recnet UTI, not baseline    MEDICATIONS  (STANDING):  amLODIPine   Tablet 10 milliGRAM(s) Oral daily  aspirin enteric coated 81 milliGRAM(s) Oral daily  atorvastatin 80 milliGRAM(s) Oral at bedtime  chlorhexidine 2% Cloths 1 Application(s) Topical <User Schedule>  clopidogrel Tablet 75 milliGRAM(s) Oral daily  heparin   Injectable 5000 Unit(s) SubCutaneous every 12 hours  pantoprazole    Tablet 40 milliGRAM(s) Oral before breakfast  polyethylene glycol 3350 17 Gram(s) Oral two times a day  senna 2 Tablet(s) Oral at bedtime    MEDICATIONS  (PRN):  acetaminophen     Tablet .. 650 milliGRAM(s) Oral every 6 hours PRN Temp greater or equal to 38C (100.4F), Mild Pain (1 - 3)  bisacodyl 10 milliGRAM(s) Oral at bedtime PRN Constipation  ondansetron   Disintegrating Tablet 4 milliGRAM(s) Oral every 8 hours PRN Nausea and/or Vomiting        RECENT LABS/IMAGING                          14.0   9.73  )-----------( 253      ( 21 Aug 2023 07:06 )             41.4     08-21    142  |  109  |  30<H>  ----------------------------<  93  4.3   |  22  |  1.0    Ca    9.1      21 Aug 2023 07:06  Mg     2.2     08-21    TPro  6.2  /  Alb  3.7  /  TBili  0.7  /  DBili  x   /  AST  32  /  ALT  47<H>  /  AlkPhos  191<H>  08-21

## 2023-08-27 RX ADMIN — ATORVASTATIN CALCIUM 80 MILLIGRAM(S): 80 TABLET, FILM COATED ORAL at 22:08

## 2023-08-27 RX ADMIN — PANTOPRAZOLE SODIUM 40 MILLIGRAM(S): 20 TABLET, DELAYED RELEASE ORAL at 06:12

## 2023-08-27 RX ADMIN — CLOPIDOGREL BISULFATE 75 MILLIGRAM(S): 75 TABLET, FILM COATED ORAL at 11:25

## 2023-08-27 RX ADMIN — HEPARIN SODIUM 5000 UNIT(S): 5000 INJECTION INTRAVENOUS; SUBCUTANEOUS at 06:13

## 2023-08-27 RX ADMIN — Medication 81 MILLIGRAM(S): at 11:25

## 2023-08-27 RX ADMIN — HEPARIN SODIUM 5000 UNIT(S): 5000 INJECTION INTRAVENOUS; SUBCUTANEOUS at 17:45

## 2023-08-27 RX ADMIN — CHLORHEXIDINE GLUCONATE 1 APPLICATION(S): 213 SOLUTION TOPICAL at 06:12

## 2023-08-27 RX ADMIN — AMLODIPINE BESYLATE 10 MILLIGRAM(S): 2.5 TABLET ORAL at 06:12

## 2023-08-27 NOTE — PROGRESS NOTE ADULT - ASSESSMENT
ASSESSMENT/PLAN  78 year old female with no known medical history (does not see a health care provider for past 20 years) brought in by son to the emergency room for confusion. As per son, patient was noted  during a phone conversation to be confused, and when he got home the confusion was worse. Unsure of last time of known welll. evaluated by neurology and ICU. Pt presented with aphasia. taken to ICU for further neuro monitoring.  labs showed leukocytosis with + nitrite on UA), s/p fall pt with R ankle fracture seen by ortho and managed conservatively with CAM boot.  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       #Rehab of gait disorder secondary to multiple intracranial vascular territory infarcts, likely 2/2 thromboembolic event in setting of intracranial vasculopathy, and right ankle fracture of lateral malleolus  #CVA; Acute Left temporal, L parietal, and L insular  - Continue DAPT 81mg ASA QD, Plavix 75mg QD, high dose statin 80mg atorvastatin 80mg qhs  - maintain SBP goals 140-180.   - continue PT/OT/SLP   - GERARDO planned for 8/14 -- refused by patient and family.   - TTE 8/10 revealed: normal LV function with EF 65%, mild MVR. no PFO or thrombus  - MCOT placed (8/17/2023)  - Making gains in mobility and speech. Continue acute rehab program.    #Aphasia  - ST/ Neuropsych following  - improving    #Azotemia    BUN 30 (8/21/2023)  - promote oral hydration   - given IVF (8/17/2023) and BUN improved to 29  - appears to be chronic, stable  - recheck in a few days    #HTN  - SBP goal per neuro 140-180  - c/w Norvasc 10mg QD  - 130- 150  - stable    #R lateral malleolus ankle fracture  - WBAT in CAM boot  - conservative non-surgical management  - Pain control tylenol 650mg q6hr PRN  - followup with orthopedics, Dr. Enoc Perez in 2 weeks orthopedics.     #s/p E.coli UTI  - completed antibiotic  - asymptomatic    #Misc  DVT ppx: SQH  Zofran for nausea  GI/bowel ppx: protonix, senna qhs  Diet: DASH/TLC diet  Code status: Full Code  -Skin: No active issues at this time    Precautions / PROPHYLAXIS:      - Falls, safety    - Ortho: Weight bearing status: WABT with CAM boot of RLE       ASSESSMENT/PLAN  78 year old female with no known medical history (does not see a health care provider for past 20 years) brought in by son to the emergency room for confusion. As per son, patient was noted  during a phone conversation to be confused, and when he got home the confusion was worse. Unsure of last time of known welll. evaluated by neurology and ICU. Pt presented with aphasia. taken to ICU for further neuro monitoring.  labs showed leukocytosis with + nitrite on UA), s/p fall pt with R ankle fracture seen by ortho and managed conservatively with CAM boot.  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       #Rehab of gait disorder secondary to multiple intracranial vascular territory infarcts, likely 2/2 thromboembolic event in setting of intracranial vasculopathy, and right ankle fracture of lateral malleolus  #CVA; Acute Left temporal, L parietal, and L insular  - Continue DAPT 81mg ASA QD, Plavix 75mg QD, high dose statin 80mg atorvastatin 80mg qhs  - maintain SBP goals 140-180.   - continue PT/OT/SLP   - GERARDO planned for 8/14 -- refused by patient and family.   - TTE 8/10 revealed: normal LV function with EF 65%, mild MVR. no PFO or thrombus  - MCOT placed (8/17/2023)  - Making gains in mobility and speech. Continue acute rehab program.    #Aphasia  - ST/ Neuropsych following  - improving    #Azotemia    BUN 30 (8/21/2023)  - promote oral hydration   - given IVF (8/17/2023) and BUN improved to 29  - appears to be chronic, stable  - recheck in a few days    #HTN  - SBP goal per neuro 140-180  - c/w Norvasc 10mg QD  - 130- 150  - stable    #R lateral malleolus ankle fracture  - WBAT in CAM boot  - conservative non-surgical management  - Pain control tylenol 650mg q6hr PRN  - followup with orthopedics, Dr. Enoc Perez in 2 weeks orthopedics.     #s/p E.coli UTI  - completed antibiotic  - asymptomatic    #Misc  DVT ppx: SQH  Zofran for nausea  GI/bowel ppx: protonix, senna qhs  Diet: DASH/TLC diet  Code status: Full Code  -Skin: No active issues at this time    Precautions / PROPHYLAXIS:      - Falls, safety    - Ortho: Weight bearing status: WBAT with CAM boot of RLE

## 2023-08-27 NOTE — PROGRESS NOTE ADULT - SUBJECTIVE AND OBJECTIVE BOX
Patient is a 78y old  Female who presents with a chief complaint of Acute rehabilitation of gait disorder secondary to multiple intracranial vascular territory infarcts, likely 2/2 thromboembolic event in setting of intracranial vasculopathy and right ankle fracture of lateral malleolus (18 Aug 2023 11:55)      HPI:  Patient is 77 yo female with hx of AMS in the past for UTI who has not seen a physician for over 20 yrs presenting with AMS that started yesterday, and progressively worsening. As per son, patient has been doing ok, yesterday morning, and around noon time, she started to get confused.  Offers no other complaints.  At baseline pt has no cognitive impairment per family. Significant labs/vitals for BP at 213/94 on presentation, WBC 15, nitrite+ on UA.    Pt evaluated by neurology and ICU and found to have NIHSS of 4 due to 2 questions and aphasia on initial presentation on 8/10/23.    CT Head Non-Contrast   - No definite acute intracranial pathology. Moderate sized chronic left occipital infarct. Multiple age-indeterminate lacunar infarcts along the right coronal  radiata.     - Indeterminate confluent hypodensity in the right frontal subcortical white matter without mass effect. Nonspecific and differential can include focus of gliosis, demyelination,    chronic microvascular changes amongst others.     CT Angio Head w/ IV Cont:    - Focal occlusion in the proximal inferior M2 branch of the left MCA with diminutive enhancement in the distal branches.   - Irregular stenosis involving multiple M2 branches of the right MCA and distal right TUCKER.   - Multifocal stenosis in the bilateral posterior cerebral arteries.    MRI reveals Acute infarcts in the left temporal lobe, left parietal lobe, and left insular cortex. Patchy subacute/acute infarct in the right frontal subcortical region superimposed on small chronic lacunar infarcts. Mild chronic microvascular ischemic changes and scattered chronic infarcts.     Pt was seen by orthopedics s/p fall with ankle pain and found to have R lateral malleolus ankle fracture to be conservatively managed with CAM boot and be WBAT. Pt was initially planned for GERARDO but patient/family refused GERARDO and loop recorder placement. MCOT set up on 8/17/2023.    obtained TTE 8/10:    1. Normal global left ventricular systolic function.   2. LV Ejection Fraction by Martinez's Method with a biplane EF of 65 %.   3. Normal left atrial size.   4. There is no evidence of pericardial effusion.   5. Mild mitral valve regurgitation.    The patient was evaluated by the PM&R team once medically stable. The patient was found to have functional limitation in terms of muscle strength, endurance, physical mobility, and ability to carry out activities of daily living (self care, transfers, and ambulation). The patient was started on a course of bedside therapy, She currently requires CG for bed mobility, min assist for transfers and to ambulate with a RW 30 feet in right CAM Boot WBAT, and min assist for UE dessing, mod assist for LE dressing. The pt is motivated and able to start 3 hours of therapy  daily for 6-7 days a week. the patient was deemed to be a good candidate for admission for acute inpatient rehab. The patient was admitted to acute inpatient rehab on 8/17/23.   (17 Aug 2023 10:19)      TODAY'S SUBJECTIVE & REVIEW OF SYMPTOMS:   Pt seen and examined at bedside.  No overnight events.  No new complaints.  Voiding urine/stool spontaneoulsy.    Tolerating therapies well.  Vitals reviewed.   Pt has MCOT since 8/17/2023.    Current level of functioning  Supervision and touch assist with bed mobility and transfers; ambulated 150 ft using RW and touch assist; 20 ft using RW with close supervision.    Review of Systems:   Constitutional:    [ x  ] WNL           [   ] poor appetite   [   ] insomnia   [   ] tired   Cardio:                [x   ] WNL           [   ] CP   [   ] SARMIENTO   [   ] palpitations               Resp:                   [ x  ] WNL           [   ] SOB   [   ] cough   [   ] wheezing   GI:                        [ x  ] WNL           [   ] constipation  [   ] diarrhea   [   ] abdominal pain   [   ] nausea   [   ] emesis                                :                      [   ] WNL           [   ] ZAVALA  [   ] dysuria   [   ] difficulty voiding        [ x ]  urinary incontinence at times   Endo:                   [x   ] WNL          [   ] polyuria   [   ] temperature intolerance                 Skin:                     [x   ] WNL          [   ] pain   [   ] wound   [   ] rash   MSK:                    [    ] WNL          [   ] muscle pain   [   x] joint pain/ stiffness right ankle   [   ] muscle tenderness   [   ] swelling   Neuro:                 [   ] WNL [ x ] per HPI          [   ] HA   [   ] change in vision   [   ] tremor   [   ] weakness   [   ]dysphagia             Psych:                     [ x ] mood/ affect WNL      Physical Exam:     Vital Signs (24 Hrs):  T(C): 36.5 (08-27-23 @ 05:38), Max: 36.9 (08-26-23 @ 19:30)  HR: 73 (08-27-23 @ 05:38) (68 - 73)  BP: 127/59 (08-27-23 @ 05:38) (127/59 - 143/65)  RR: 18 (08-27-23 @ 05:38) (18 - 18)  SpO2: --  Wt(kg): --  Daily     Daily     I&O's Summary      General:[x   ] NAD, Resting Comfortable,   [   ] other:                                HEENT: [ x  ] NC/AT, EOMI, PERRL , Normal Conjunctivae,   [   ] other:  Cardio: [ x  ] RRR, no murmer,   [   ] other:                              Pulm: [ x  ] No Respiratory Distress,  Lungs CTAB,   [   ] other:                       Abdomen: [   ]ND/NT, Soft,   [   ] other:    : [ x  ] NO ZAVALA CATHETER, [   ] ZAVALA CATHETER- no meatal tear, no discharge, [   ] other:                                            MSK: [   ] No joint swelling, Full ROM,   [ x  ] other:  right lateral ankle swelling and tenderness                                       Ext: [ x  ]No C/C/E, No calf tenderness,   [   ]other:    Skin: [x   ]intact,   [   ] other:                                                                   Neurological Examination:  Cognitive: [    ] AAO x 3,   [ x   ]  other:  oriented to self and place, year with choices (impacted by aphasia)                                                            Attention:  [ x   ] intact,   [    ]  other:                            Memory: [    ] intact,    [  x  ]  other:   limited by aphasia  Mood/Affect: [   x ] wnl,    [    ]  other:                                                                             Communication: [    ]Fluent, no dysarthria, following commands:  [  x  ] other: good comprehension, mild to moderate nonfluent expressive impairment - improving  CN II - XII:  [  x  ] intact,  [    ] other:                                                                                        Motor:   RIGHT UE: [x   ] WNL,  [   ] other:  LEFT    UE: [ x  ] WNL,  [   ] other:  RIGHT LE: [x   ] WNL,  [   ] other:   LEFT    LE: [ x  ] WNL,  [   ] other:    Tone: [x    ] wnl,   [    ]  other:  DTRs: [ x  ]symmetric, [   ] other:  Coordination:   [    ] intact,   [  x  ] other:  mild decrease both legs                                                                         Sensory: [   x ] Intact to light touch,   [    ] other: Cognitive:           [   ] WNL           [ x  ]confusion  with recnet UTI, not baseline    MEDICATIONS  (STANDING):  amLODIPine   Tablet 10 milliGRAM(s) Oral daily  aspirin enteric coated 81 milliGRAM(s) Oral daily  atorvastatin 80 milliGRAM(s) Oral at bedtime  chlorhexidine 2% Cloths 1 Application(s) Topical <User Schedule>  clopidogrel Tablet 75 milliGRAM(s) Oral daily  heparin   Injectable 5000 Unit(s) SubCutaneous every 12 hours  pantoprazole    Tablet 40 milliGRAM(s) Oral before breakfast  polyethylene glycol 3350 17 Gram(s) Oral two times a day  senna 2 Tablet(s) Oral at bedtime    MEDICATIONS  (PRN):  acetaminophen     Tablet .. 650 milliGRAM(s) Oral every 6 hours PRN Temp greater or equal to 38C (100.4F), Mild Pain (1 - 3)  bisacodyl 10 milliGRAM(s) Oral at bedtime PRN Constipation  ondansetron   Disintegrating Tablet 4 milliGRAM(s) Oral every 8 hours PRN Nausea and/or Vomiting        RECENT LABS/IMAGING                          14.0   9.73  )-----------( 253      ( 21 Aug 2023 07:06 )             41.4     08-21    142  |  109  |  30<H>  ----------------------------<  93  4.3   |  22  |  1.0    Ca    9.1      21 Aug 2023 07:06  Mg     2.2     08-21    TPro  6.2  /  Alb  3.7  /  TBili  0.7  /  DBili  x   /  AST  32  /  ALT  47<H>  /  AlkPhos  191<H>  08-21     Patient is a 78y old  Female who presents with a chief complaint of Acute rehabilitation of gait disorder secondary to multiple intracranial vascular territory infarcts, likely 2/2 thromboembolic event in setting of intracranial vasculopathy and right ankle fracture of lateral malleolus (18 Aug 2023 11:55)      HPI:  Patient is 77 yo female with hx of AMS in the past for UTI who has not seen a physician for over 20 yrs presenting with AMS that started yesterday, and progressively worsening. As per son, patient has been doing ok, yesterday morning, and around noon time, she started to get confused.  Offers no other complaints.  At baseline pt has no cognitive impairment per family. Significant labs/vitals for BP at 213/94 on presentation, WBC 15, nitrite+ on UA.    Pt evaluated by neurology and ICU and found to have NIHSS of 4 due to 2 questions and aphasia on initial presentation on 8/10/23.    CT Head Non-Contrast   - No definite acute intracranial pathology. Moderate sized chronic left occipital infarct. Multiple age-indeterminate lacunar infarcts along the right coronal  radiata.     - Indeterminate confluent hypodensity in the right frontal subcortical white matter without mass effect. Nonspecific and differential can include focus of gliosis, demyelination,    chronic microvascular changes amongst others.     CT Angio Head w/ IV Cont:    - Focal occlusion in the proximal inferior M2 branch of the left MCA with diminutive enhancement in the distal branches.   - Irregular stenosis involving multiple M2 branches of the right MCA and distal right TUCKER.   - Multifocal stenosis in the bilateral posterior cerebral arteries.    MRI reveals Acute infarcts in the left temporal lobe, left parietal lobe, and left insular cortex. Patchy subacute/acute infarct in the right frontal subcortical region superimposed on small chronic lacunar infarcts. Mild chronic microvascular ischemic changes and scattered chronic infarcts.     Pt was seen by orthopedics s/p fall with ankle pain and found to have R lateral malleolus ankle fracture to be conservatively managed with CAM boot and be WBAT. Pt was initially planned for GERARDO but patient/family refused GERARDO and loop recorder placement. MCOT set up on 8/17/2023.    obtained TTE 8/10:    1. Normal global left ventricular systolic function.   2. LV Ejection Fraction by Martinez's Method with a biplane EF of 65 %.   3. Normal left atrial size.   4. There is no evidence of pericardial effusion.   5. Mild mitral valve regurgitation.    The patient was evaluated by the PM&R team once medically stable. The patient was found to have functional limitation in terms of muscle strength, endurance, physical mobility, and ability to carry out activities of daily living (self care, transfers, and ambulation). The patient was started on a course of bedside therapy, She currently requires CG for bed mobility, min assist for transfers and to ambulate with a RW 30 feet in right CAM Boot WBAT, and min assist for UE dessing, mod assist for LE dressing. The pt is motivated and able to start 3 hours of therapy  daily for 6-7 days a week. the patient was deemed to be a good candidate for admission for acute inpatient rehab. The patient was admitted to acute inpatient rehab on 8/17/23.   (17 Aug 2023 10:19)      TODAY'S SUBJECTIVE & REVIEW OF SYMPTOMS:   Pt seen and examined at bedside.  No overnight events.  No new complaints.  Voiding urine/stool spontaneously.    Tolerating therapies well.  Vitals reviewed.   Pt has MCOT since 8/17/2023.    Current level of functioning  Supervision and touch assist with bed mobility and transfers; ambulated 150 ft using RW and touch assist; 20 ft using RW with close supervision.    Review of Systems:   Constitutional:    [ x  ] WNL           [   ] poor appetite   [   ] insomnia   [   ] tired   Cardio:                [x   ] WNL           [   ] CP   [   ] SARMIENTO   [   ] palpitations               Resp:                   [ x  ] WNL           [   ] SOB   [   ] cough   [   ] wheezing   GI:                        [ x  ] WNL           [   ] constipation  [   ] diarrhea   [   ] abdominal pain   [   ] nausea   [   ] emesis                                :                      [   ] WNL           [   ] ZAVALA  [   ] dysuria   [   ] difficulty voiding        [ x ]  urinary incontinence at times   Endo:                   [x   ] WNL          [   ] polyuria   [   ] temperature intolerance                 Skin:                     [x   ] WNL          [   ] pain   [   ] wound   [   ] rash   MSK:                    [    ] WNL          [   ] muscle pain   [   x] joint pain/ stiffness right ankle   [   ] muscle tenderness   [   ] swelling   Neuro:                 [   ] WNL [ x ] per HPI          [   ] HA   [   ] change in vision   [   ] tremor   [   ] weakness   [   ]dysphagia             Psych:                     [ x ] mood/ affect WNL      Physical Exam:     Vital Signs (24 Hrs):  T(C): 36.5 (08-27-23 @ 05:38), Max: 36.9 (08-26-23 @ 19:30)  HR: 73 (08-27-23 @ 05:38) (68 - 73)  BP: 127/59 (08-27-23 @ 05:38) (127/59 - 143/65)  RR: 18 (08-27-23 @ 05:38) (18 - 18)  SpO2: --  Wt(kg): --  Daily     Daily     I&O's Summary      General:[x   ] NAD, Resting Comfortable,   [   ] other:                                HEENT: [ x  ] NC/AT, EOMI, PERRL , Normal Conjunctivae,   [   ] other:  Cardio: [ x  ] RRR, no murmer,   [   ] other:                              Pulm: [ x  ] No Respiratory Distress,  Lungs CTAB,   [   ] other:                       Abdomen: [   ]ND/NT, Soft,   [   ] other:    : [ x  ] NO ZAVALA CATHETER, [   ] ZAVALA CATHETER- no meatal tear, no discharge, [   ] other:                                            MSK: [   ] No joint swelling, Full ROM,   [ x  ] other:  right lateral ankle swelling and tenderness                                       Ext: [ x  ]No C/C/E, No calf tenderness,   [   ]other:    Skin: [x   ]intact,   [   ] other:                                                                   Neurological Examination:  Cognitive: [    ] AAO x 3,   [ x   ]  other:  oriented to self and place, year with choices (impacted by aphasia)                                                            Attention:  [ x   ] intact,   [    ]  other:                            Memory: [    ] intact,    [  x  ]  other:   limited by aphasia  Mood/Affect: [   x ] wnl,    [    ]  other:                                                                             Communication: [    ]Fluent, no dysarthria, following commands:  [  x  ] other: good comprehension, mild to moderate nonfluent expressive impairment - improving  CN II - XII:  [  x  ] intact,  [    ] other:                                                                                        Motor:   RIGHT UE: [x   ] WNL,  [   ] other:  LEFT    UE: [ x  ] WNL,  [   ] other:  RIGHT LE: [x   ] WNL,  [   ] other:   LEFT    LE: [ x  ] WNL,  [   ] other:    Tone: [x    ] wnl,   [    ]  other:  DTRs: [ x  ]symmetric, [   ] other:  Coordination:   [    ] intact,   [  x  ] other:  mild decrease both legs                                                                         Sensory: [   x ] Intact to light touch,   [    ] other: Cognitive:           [   ] WNL           [ x  ]confusion  with recnet UTI, not baseline    MEDICATIONS  (STANDING):  amLODIPine   Tablet 10 milliGRAM(s) Oral daily  aspirin enteric coated 81 milliGRAM(s) Oral daily  atorvastatin 80 milliGRAM(s) Oral at bedtime  chlorhexidine 2% Cloths 1 Application(s) Topical <User Schedule>  clopidogrel Tablet 75 milliGRAM(s) Oral daily  heparin   Injectable 5000 Unit(s) SubCutaneous every 12 hours  pantoprazole    Tablet 40 milliGRAM(s) Oral before breakfast  polyethylene glycol 3350 17 Gram(s) Oral two times a day  senna 2 Tablet(s) Oral at bedtime    MEDICATIONS  (PRN):  acetaminophen     Tablet .. 650 milliGRAM(s) Oral every 6 hours PRN Temp greater or equal to 38C (100.4F), Mild Pain (1 - 3)  bisacodyl 10 milliGRAM(s) Oral at bedtime PRN Constipation  ondansetron   Disintegrating Tablet 4 milliGRAM(s) Oral every 8 hours PRN Nausea and/or Vomiting        RECENT LABS/IMAGING                          14.0   9.73  )-----------( 253      ( 21 Aug 2023 07:06 )             41.4     08-21    142  |  109  |  30<H>  ----------------------------<  93  4.3   |  22  |  1.0    Ca    9.1      21 Aug 2023 07:06  Mg     2.2     08-21    TPro  6.2  /  Alb  3.7  /  TBili  0.7  /  DBili  x   /  AST  32  /  ALT  47<H>  /  AlkPhos  191<H>  08-21

## 2023-08-28 ENCOUNTER — TRANSCRIPTION ENCOUNTER (OUTPATIENT)
Age: 78
End: 2023-08-28

## 2023-08-28 LAB
ALBUMIN SERPL ELPH-MCNC: 3.9 G/DL — SIGNIFICANT CHANGE UP (ref 3.5–5.2)
ALP SERPL-CCNC: 181 U/L — HIGH (ref 30–115)
ALT FLD-CCNC: 37 U/L — SIGNIFICANT CHANGE UP (ref 0–41)
ANION GAP SERPL CALC-SCNC: 11 MMOL/L — SIGNIFICANT CHANGE UP (ref 7–14)
AST SERPL-CCNC: 28 U/L — SIGNIFICANT CHANGE UP (ref 0–41)
BASOPHILS # BLD AUTO: 0.05 K/UL — SIGNIFICANT CHANGE UP (ref 0–0.2)
BASOPHILS NFR BLD AUTO: 0.6 % — SIGNIFICANT CHANGE UP (ref 0–1)
BILIRUB SERPL-MCNC: 0.7 MG/DL — SIGNIFICANT CHANGE UP (ref 0.2–1.2)
BUN SERPL-MCNC: 30 MG/DL — HIGH (ref 10–20)
CALCIUM SERPL-MCNC: 9.4 MG/DL — SIGNIFICANT CHANGE UP (ref 8.4–10.5)
CHLORIDE SERPL-SCNC: 110 MMOL/L — SIGNIFICANT CHANGE UP (ref 98–110)
CO2 SERPL-SCNC: 20 MMOL/L — SIGNIFICANT CHANGE UP (ref 17–32)
CREAT SERPL-MCNC: 1.1 MG/DL — SIGNIFICANT CHANGE UP (ref 0.7–1.5)
EGFR: 52 ML/MIN/1.73M2 — LOW
EOSINOPHIL # BLD AUTO: 0.29 K/UL — SIGNIFICANT CHANGE UP (ref 0–0.7)
EOSINOPHIL NFR BLD AUTO: 3.5 % — SIGNIFICANT CHANGE UP (ref 0–8)
GLUCOSE SERPL-MCNC: 100 MG/DL — HIGH (ref 70–99)
HCT VFR BLD CALC: 43.1 % — SIGNIFICANT CHANGE UP (ref 37–47)
HGB BLD-MCNC: 14.5 G/DL — SIGNIFICANT CHANGE UP (ref 12–16)
IMM GRANULOCYTES NFR BLD AUTO: 0.2 % — SIGNIFICANT CHANGE UP (ref 0.1–0.3)
LYMPHOCYTES # BLD AUTO: 1.5 K/UL — SIGNIFICANT CHANGE UP (ref 1.2–3.4)
LYMPHOCYTES # BLD AUTO: 17.9 % — LOW (ref 20.5–51.1)
MAGNESIUM SERPL-MCNC: 2.2 MG/DL — SIGNIFICANT CHANGE UP (ref 1.8–2.4)
MCHC RBC-ENTMCNC: 30.2 PG — SIGNIFICANT CHANGE UP (ref 27–31)
MCHC RBC-ENTMCNC: 33.6 G/DL — SIGNIFICANT CHANGE UP (ref 32–37)
MCV RBC AUTO: 89.8 FL — SIGNIFICANT CHANGE UP (ref 81–99)
MONOCYTES # BLD AUTO: 0.69 K/UL — HIGH (ref 0.1–0.6)
MONOCYTES NFR BLD AUTO: 8.2 % — SIGNIFICANT CHANGE UP (ref 1.7–9.3)
NEUTROPHILS # BLD AUTO: 5.85 K/UL — SIGNIFICANT CHANGE UP (ref 1.4–6.5)
NEUTROPHILS NFR BLD AUTO: 69.6 % — SIGNIFICANT CHANGE UP (ref 42.2–75.2)
NRBC # BLD: 0 /100 WBCS — SIGNIFICANT CHANGE UP (ref 0–0)
PLATELET # BLD AUTO: 266 K/UL — SIGNIFICANT CHANGE UP (ref 130–400)
PMV BLD: 10.2 FL — SIGNIFICANT CHANGE UP (ref 7.4–10.4)
POTASSIUM SERPL-MCNC: 4.5 MMOL/L — SIGNIFICANT CHANGE UP (ref 3.5–5)
POTASSIUM SERPL-SCNC: 4.5 MMOL/L — SIGNIFICANT CHANGE UP (ref 3.5–5)
PROT SERPL-MCNC: 6.7 G/DL — SIGNIFICANT CHANGE UP (ref 6–8)
RBC # BLD: 4.8 M/UL — SIGNIFICANT CHANGE UP (ref 4.2–5.4)
RBC # FLD: 13.4 % — SIGNIFICANT CHANGE UP (ref 11.5–14.5)
SODIUM SERPL-SCNC: 141 MMOL/L — SIGNIFICANT CHANGE UP (ref 135–146)
WBC # BLD: 8.4 K/UL — SIGNIFICANT CHANGE UP (ref 4.8–10.8)
WBC # FLD AUTO: 8.4 K/UL — SIGNIFICANT CHANGE UP (ref 4.8–10.8)

## 2023-08-28 RX ORDER — ACETAMINOPHEN 500 MG
2 TABLET ORAL
Qty: 0 | Refills: 0 | DISCHARGE
Start: 2023-08-28

## 2023-08-28 RX ORDER — POLYETHYLENE GLYCOL 3350 17 G/17G
17 POWDER, FOR SOLUTION ORAL
Qty: 0 | Refills: 0 | DISCHARGE
Start: 2023-08-28

## 2023-08-28 RX ADMIN — CHLORHEXIDINE GLUCONATE 1 APPLICATION(S): 213 SOLUTION TOPICAL at 06:22

## 2023-08-28 RX ADMIN — ATORVASTATIN CALCIUM 80 MILLIGRAM(S): 80 TABLET, FILM COATED ORAL at 21:19

## 2023-08-28 RX ADMIN — HEPARIN SODIUM 5000 UNIT(S): 5000 INJECTION INTRAVENOUS; SUBCUTANEOUS at 06:22

## 2023-08-28 RX ADMIN — CLOPIDOGREL BISULFATE 75 MILLIGRAM(S): 75 TABLET, FILM COATED ORAL at 12:11

## 2023-08-28 RX ADMIN — PANTOPRAZOLE SODIUM 40 MILLIGRAM(S): 20 TABLET, DELAYED RELEASE ORAL at 06:23

## 2023-08-28 RX ADMIN — Medication 81 MILLIGRAM(S): at 12:11

## 2023-08-28 RX ADMIN — HEPARIN SODIUM 5000 UNIT(S): 5000 INJECTION INTRAVENOUS; SUBCUTANEOUS at 17:56

## 2023-08-28 NOTE — DISCHARGE NOTE PROVIDER - PROVIDER TOKENS
PROVIDER:[TOKEN:[[id number]],FOLLOWUP:[2 weeks]],PROVIDER:[TOKEN:[[id number]],FOLLOWUP:[2 weeks]] PROVIDER:[TOKEN:[[id number]],FOLLOWUP:[2 weeks]],PROVIDER:[TOKEN:[[id number]],FOLLOWUP:[2 weeks]],PROVIDER:[TOKEN:[[id number]]],FREE:[LAST:[EP cardiologist],PHONE:[(   )    -],FAX:[(   )    -]] PROVIDER:[TOKEN:[[id number]],FOLLOWUP:[2 weeks]],PROVIDER:[TOKEN:[[id number]],SCHEDULEDAPPT:[09/26/2023]],PROVIDER:[TOKEN:[[id number]],SCHEDULEDAPPT:[09/21/2023],SCHEDULEDAPPTTIME:[02:15 PM]],FREE:[LAST:[EP cardiologist],PHONE:[(   )    -],FAX:[(   )    -]]

## 2023-08-28 NOTE — DISCHARGE NOTE PROVIDER - NSDCCPCAREPLAN_GEN_ALL_CORE_FT
PRINCIPAL DISCHARGE DIAGNOSIS  Diagnosis: Stroke  Assessment and Plan of Treatment:       SECONDARY DISCHARGE DIAGNOSES  Diagnosis: Fracture of right ankle  Assessment and Plan of Treatment:     Diagnosis: UTI (urinary tract infection)  Assessment and Plan of Treatment:     Diagnosis: Hypertension  Assessment and Plan of Treatment:      PRINCIPAL DISCHARGE DIAGNOSIS  Diagnosis: Stroke  Assessment and Plan of Treatment: You were admitted with multiple strokes in left temporal, left parietal and left insular cortex area s of brain , YOu were seen by Neurology and started on asa, plavix ( 90 days  after stroke ) then possibly only asa  , please follow up with your neurologist in 1 to 2 weeks, an appointment is made with neuro NP   for now , please follow up  , continue a diet with low sodium and heart healthy ,  continue physical therapy as advised , evan dickerson your docor know if any changes in your health      SECONDARY DISCHARGE DIAGNOSES  Diagnosis: Fracture of right ankle  Assessment and Plan of Treatment: On admission you were found to have an ankle fracure on your rt foot , You were seen by ortho doctor and advised non weight bearing in CAM boot for 6 to 8  weeks. please  up with  your ortho doctor for more advise .    Diagnosis: UTI (urinary tract infection)  Assessment and Plan of Treatment: On admission you were treated with antibiotics for Urinary tract infection ,    Diagnosis: Hypertension  Assessment and Plan of Treatment: You are started on norvasc( Amlodipin) please continue this and follow up with your primary medical doctor , we have made an appointment for you with dr North on 9/21/at 2;15 pm . please do follow up .     PRINCIPAL DISCHARGE DIAGNOSIS  Diagnosis: Stroke  Assessment and Plan of Treatment: You were admitted with multiple strokes in left temporal, left parietal and left insular cortex area s of brain , YOu were seen by Neurology and started on asa, plavix ( 90 days  after stroke ) then possibly only asa  , please follow up with your neurologist in 1 to 2 weeks, an appointment is made with neuro NP   on 9/26, please follow up  , Continue a diet with low sodium and heart healthy ,  continue physical therapy as advised , evan dickerson your docor know if any changes in your health      SECONDARY DISCHARGE DIAGNOSES  Diagnosis: Fracture of right ankle  Assessment and Plan of Treatment: On admission you were found to have an ankle fracure on your rt foot , You were seen by ortho doctor and advised non weight bearing in CAM boot for 6 to 8  weeks. please  up with  your ortho doctor for more advise .    Diagnosis: UTI (urinary tract infection)  Assessment and Plan of Treatment: On admission you were treated with antibiotics for Urinary tract infection ,    Diagnosis: Hypertension  Assessment and Plan of Treatment: You are started on norvasc( Amlodipin) please continue this and follow up with your primary medical doctor , we have made an appointment for you with dr North on 9/21/at 2;15 pm . please do follow up .     PRINCIPAL DISCHARGE DIAGNOSIS  Diagnosis: Stroke  Assessment and Plan of Treatment: You were admitted with multiple strokes in left temporal, left parietal and left insular cortex areas of brain , You were seen by Neurology and started on 2 blood thinners called aspirin and, plavix ( 90 days  after stroke ) then possibly only aspirin. Please follow up with your neurologist in 1 to 2 weeks, an appointment is made with neuro NP   on 9/26, please follow up  , Continue a diet with low sodium and heart healthy (low fat low cholesterol),  continue physical therapy as advised , please let your doctor know if any changes in your health.      SECONDARY DISCHARGE DIAGNOSES  Diagnosis: Fracture of right ankle  Assessment and Plan of Treatment: On admission you were found to have an ankle fracure on your right foot , You were seen by ortho doctor and you can weight bear as tolerated in CAM boot for 6 to 8  weeks. please follow up with  your ortho doctor for more advice .    Diagnosis: UTI (urinary tract infection)  Assessment and Plan of Treatment: On admission you were treated with antibiotics for Urinary tract infection. To help prevent another infection, it is important to keep well-hydrated so that your urine is dilute (colorless or very light yelllow) and you empty your bladder often. Also always wipe from front to back to prevent bacteria from the rectum from entering into the urethra.    Diagnosis: Hypertension  Assessment and Plan of Treatment: You are started on norvasc( Amlodipine); please continue as prescribed and follow up with your primary medical doctor , we have made an appointment for you with dr North on 9/21/at 2;15 pm . please do follow up .

## 2023-08-28 NOTE — PROGRESS NOTE ADULT - ASSESSMENT
ASSESSMENT/PLAN  78 year old female with no known medical history (does not see a health care provider for past 20 years) brought in by son to the emergency room for confusion. As per son, patient was noted  during a phone conversation to be confused, and when he got home the confusion was worse. Unsure of last time of known welll. evaluated by neurology and ICU. Pt presented with aphasia. taken to ICU for further neuro monitoring.  labs showed leukocytosis with + nitrite on UA), s/p fall pt with R ankle fracture seen by ortho and managed conservatively with CAM boot.  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       #Rehab of gait disorder secondary to multiple intracranial vascular territory infarcts, likely 2/2 thromboembolic event in setting of intracranial vasculopathy, and right ankle fracture of lateral malleolus  #CVA; Acute Left temporal, L parietal, and L insular  - Continue DAPT 81mg ASA QD, Plavix 75mg QD, high dose statin 80mg atorvastatin 80mg qhs  - maintain SBP goals 140-180.   - continue PT/OT/SLP   - GERARDO planned for 8/14 -- refused by patient and family.   - TTE 8/10 revealed: normal LV function with EF 65%, mild MVR. no PFO or thrombus  - MCOT placed (8/17/2023)  - Making gains in mobility and speech. Continue acute rehab program.    #Aphasia  - ST/ Neuropsych following  - improving    #Azotemia   BUN 30/Cr 1.1 (8/28/2023)  - promote oral hydration   - given IVF (8/17/2023) and BUN improved to 29  - appears to be chronic, stable  - recheck in a few days    #HTN  - SBP goal per neuro 140-180  - c/w Norvasc 10mg QD  - 130- 150  - stable    #R lateral malleolus ankle fracture  - WBAT in CAM boot  - conservative non-surgical management  - Pain control tylenol 650mg q6hr PRN  - followup with orthopedics, Dr. Enoc Perez in 2 weeks orthopedics.     #s/p E.coli UTI  - completed antibiotic  - asymptomatic    #Misc  DVT ppx: SQH  Zofran for nausea  GI/bowel ppx: protonix, senna qhs  Diet: DASH/TLC diet  Code status: Full Code  -Skin: No active issues at this time    Precautions / PROPHYLAXIS:      - Falls, safety    - Ortho: Weight bearing status: WBAT with CAM boot of RLE

## 2023-08-28 NOTE — DISCHARGE NOTE PROVIDER - CARE PROVIDERS DIRECT ADDRESSES
,DirectAddress_Unknown,DirectAddress_Unknown ,DirectAddress_Unknown,DirectAddress_Unknown,[email or web address],DirectAddress_Unknown

## 2023-08-28 NOTE — DISCHARGE NOTE PROVIDER - HOSPITAL COURSE
Reason for Admission: Acute rehabilitation of gait disorder secondary to multiple intracranial vascular territory infarcts, likely 2/2 thromboembolic event in setting of intracranial vasculopathy and right ankle fracture of lateral malleolus  History of Present Illness:   Patient is 77 yo female with hx of AMS in the past for UTI who had not seen a physician for over 20 yrs presented on 8/10/23 with AMS that started one day PTA, and progressively worsening. As per son, patient had been doing ok in the morning, and around noon time, she started to get confused, no other complaints At baseline pt has no cognitive impairment per family. Significant labs/vitals for BP at 213/94 on presentation, WBC 15, nitrite+ on UA.    Pt evaluated by neurology and ICU and found to have NIHSS of 4 due to 2 questions and aphasia on initial presentation on 8/10/23.  CT Head Non-Contrast   - No definite acute intracranial pathology. Moderate sized chronic left occipital infarct. Multiple age-indeterminate lacunar infarcts along the right coronal  radiata.     - Indeterminate confluent hypodensity in the right frontal subcortical white matter without mass effect. Nonspecific and differential can include focus of gliosis, demyelination,    chronic microvascular changes amongst others.   CT Angio Head w/ IV Cont:    - Focal occlusion in the proximal inferior M2 branch of the left MCA with diminutive enhancement in the distal branches.   - Irregular stenosis involving multiple M2 branches of the right MCA and distal right TUCKER.   - Multifocal stenosis in the bilateral posterior cerebral arteries.  MRI reveals Acute infarcts in the left temporal lobe, left parietal lobe, and left insular cortex. Patchy subacute/acute infarct in the right frontal subcortical region superimposed on small chronic lacunar infarcts. Mild chronic microvascular ischemic changes and scattered chronic infarcts.     Pt was seen by orthopedics s/p fall with ankle pain and found to have R lateral malleolus ankle fracture to be conservatively managed and can be WBAT with CAM boot. Pt was initially planned for GERARDO but patient/family refused GERARDO and loop recorder placement. MCOT was set up on day of discharge to 4A (8/17/23).     obtained TTE 8/10:    1. Normal global left ventricular systolic function.   2. LV Ejection Fraction by Martinez's Method with a biplane EF of 65 %.   3. Normal left atrial size.   4. There is no evidence of pericardial effusion.   5. Mild mitral valve regurgitation.    The patient was evaluated by the PM&R team once medically stable.   PLOF: Fully independent with all ADL/iADLs, ambulation without AD  LS:pt lives with her daughter in a private house with 1 platform step to enter and 3+10 steps upstairs to bedroom and bathroom area with rails.  CLOF: Supervision and touch assist with bed mobility and transfers; ambulated 150 ft using RW and touch assist    ASSESSMENT/PLAN:  #Rehab of gait disorder secondary to multiple intracranial vascular territory infarcts, likely 2/2 thromboembolic event in setting of intracranial vasculopathy, and right ankle fracture of lateral malleolus  #CVA; Acute Left temporal, L parietal, and L insular  - Continue DAPT 81mg ASA QD, Plavix 75mg QD, high dose statin 80mg atorvastatin 80mg qhs; continue DAPT x 90 days  - maintain SBP goals 140-180.   - continue PT/OT/SLP   - GERARDO planned for 8/14 -- refused by patient and family.   - TTE 8/10 revealed: normal LV function with EF 65%, mild MVR. no PFO or thrombus  - MCOT placed (8/17/2023)  - Making gains in mobility and speech. Continue acute rehab program.    #Aphasia  - ST/ Neuropsych following  - improving    #Azotemia   BUN 30/Cr 1.1 (8/28/2023)  - promote oral hydration   - given IVF (8/17/2023) and BUN improved to 29  - appears to be chronic, stable  - recheck in a few days    #HTN  - SBP goal per neuro 140-180  - c/w Norvasc 10mg QD   - 130- 150  - stable    #R lateral malleolus ankle fracture  - WBAT in CAM boot  - conservative non-surgical management  - Pain control tylenol 650mg q6hr PRN  - followup with orthopedics, Dr. Enoc Perez in 2 weeks orthopedics.     #s/p E.coli UTI  - completed antibiotic  - asymptomatic; repeat UA 8/17 was negative    #Misc  DVT ppx: SQH  Zofran for nausea  GI/bowel ppx: protonix, senna qhs  Diet: DASH/TLC diet  Code status: Full Code  -Skin: No active issues at this time    Precautions / PROPHYLAXIS:    - Falls, safety  - Ortho: Weight bearing status: WBAT with CAM boot of RLE Reason for Admission: Acute rehabilitation of gait disorder secondary to multiple intracranial vascular territory infarcts, likely 2/2 thromboembolic event in setting of intracranial vasculopathy and right ankle fracture of lateral malleolus  History of Present Illness:   Patient is 79 yo female with hx of AMS in the past for UTI who had not seen a physician for over 20 yrs presented on 8/10/23 with AMS that started one day PTA, and progressively worsening. As per son, patient had been doing ok in the morning, and around noon time, she started to get confused, no other complaints At baseline pt has no cognitive impairment per family. Significant labs/vitals for BP at 213/94 on presentation, WBC 15, nitrite+ on UA.    Pt evaluated by neurology and ICU and found to have NIHSS of 4 due to 2 questions and aphasia on initial presentation on 8/10/23.  CT Head Non-Contrast   - No definite acute intracranial pathology. Moderate sized chronic left occipital infarct. Multiple age-indeterminate lacunar infarcts along the right coronal  radiata.     - Indeterminate confluent hypodensity in the right frontal subcortical white matter without mass effect. Nonspecific and differential can include focus of gliosis, demyelination,    chronic microvascular changes amongst others.   CT Angio Head w/ IV Cont:    - Focal occlusion in the proximal inferior M2 branch of the left MCA with diminutive enhancement in the distal branches.   - Irregular stenosis involving multiple M2 branches of the right MCA and distal right TUCKER.   - Multifocal stenosis in the bilateral posterior cerebral arteries.  MRI reveals Acute infarcts in the left temporal lobe, left parietal lobe, and left insular cortex. Patchy subacute/acute infarct in the right frontal subcortical region superimposed on small chronic lacunar infarcts. Mild chronic microvascular ischemic changes and scattered chronic infarcts.     Pt was seen by orthopedics s/p fall with ankle pain and found to have R lateral malleolus ankle fracture to be conservatively managed and can be WBAT with CAM boot. Pt was initially planned for GERARDO but patient/family refused GERARDO and loop recorder placement. MCOT was set up on day of discharge to 4A (8/17/23).     obtained TTE 8/10:    1. Normal global left ventricular systolic function.   2. LV Ejection Fraction by Martinez's Method with a biplane EF of 65 %.   3. Normal left atrial size.   4. There is no evidence of pericardial effusion.   5. Mild mitral valve regurgitation.    The patient was evaluated by the PM&R team once medically stable.   PLOF: Fully independent with all ADL/iADLs, ambulation without AD  LS:pt lives with her daughter in a private house with 1 platform step to enter and 3+10 steps upstairs to bedroom and bathroom area with rails.  CLOF: Supervision and touch assist with bed mobility and transfers; ambulated 150 ft using RW and touch assist    ASSESSMENT/PLAN:  #Rehab of gait disorder secondary to multiple intracranial vascular territory infarcts, likely 2/2 thromboembolic event in setting of intracranial vasculopathy, and right ankle fracture of lateral malleolus  #CVA; Acute Left temporal, L parietal, and L insular  - Continue DAPT 81mg ASA QD, Plavix 75mg QD, high dose statin 80mg atorvastatin 80mg qhs; continue DAPT x 90 days  - maintain SBP goals 140-180.   - continue PT/OT/SLP   - GERARDO planned for 8/14 -- refused by patient and family.   - TTE 8/10 revealed: normal LV function with EF 65%, mild MVR. no PFO or thrombus  - MCOT placed (8/17/2023)  - Making gains in mobility and speech. Continue acute rehab program.    #Aphasia  - ST/ Neuropsych following  - improving    #Azotemia   BUN 30/Cr 1.1 (8/28/2023)  - promote oral hydration   - given IVF (8/17/2023) and BUN improved to 29  - appears to be chronic, stable  - recheck in a few days    #HTN  - SBP goal per neuro 140-180  - c/w Norvasc 10mg QD   - 130- 150  - stable    #R lateral malleolus ankle fracture  - WBAT in CAM boot  - conservative non-surgical management  - Pain control tylenol 650mg q6hr PRN  - followup with orthopedics, Dr. Enoc Perez in 2 weeks orthopedics.     #s/p E.coli UTI  - completed antibiotic  - asymptomatic; repeat UA 8/17 was negative    #Misc  DVT ppx: SQH  Zofran for nausea  GI/bowel ppx: protonix, senna qhs  Diet: DASH/TLC diet  Code status: Full Code  -Skin: No active issues at this time    Precautions / PROPHYLAXIS:    - Falls, safety  - Ortho: Weight bearing status: WBAT with CAM boot of RLE    The patient is being discharged home with home care on 8/31/23. An appointment was made for [facility] at [facility] on 9/21/23 with Dr. North, since she did not have a PCP. She will also be followed  by Neuro[facility]Stroke Clinic (she has a telehealth appointment on 9/26/23) and with Ortho Dr. Perez for her R ankle fx.

## 2023-08-28 NOTE — DISCHARGE NOTE PROVIDER - NSDCHHASSISTILLNESS_GEN_ALL_CORE
Stroke and rt foot fracture in Cam boot and non weight bearing  [Follow-Up - Clinic] : a clinic follow-up of [Atrial Fibrillation] : atrial fibrillation

## 2023-08-28 NOTE — DISCHARGE NOTE PROVIDER - NSDCMRMEDTOKEN_GEN_ALL_CORE_FT
acetaminophen 325 mg oral tablet: 2 tab(s) orally every 6 hours as needed for pain or fever  aspirin 81 mg oral delayed release tablet: 1 tab(s) orally once a day  atorvastatin 80 mg oral tablet: 1 tab(s) orally once a day (at bedtime)  clopidogrel 75 mg oral tablet: 1 tab(s) orally once a day  Norvasc 10 mg oral tablet: 1 tab(s) orally once a day  pantoprazole 40 mg oral delayed release tablet: 1 tab(s) orally once a day (in the morning) , take 30 minutes before breakfast  polyethylene glycol 3350 oral powder for reconstitution: 17 gram(s) orally 1 to 2 times a day as needed for  constipation  senna leaf extract oral tablet: 2 tab(s) orally once a day (at bedtime) as needed for Constipation

## 2023-08-28 NOTE — PROGRESS NOTE ADULT - SUBJECTIVE AND OBJECTIVE BOX
Patient is a 78y old  Female who presents with a chief complaint of Acute rehabilitation of gait disorder secondary to multiple intracranial vascular territory infarcts, likely 2/2 thromboembolic event in setting of intracranial vasculopathy and right ankle fracture of lateral malleolus (18 Aug 2023 11:55)      HPI:  Patient is 79 yo female with hx of AMS in the past for UTI who has not seen a physician for over 20 yrs presenting with AMS that started yesterday, and progressively worsening. As per son, patient has been doing ok, yesterday morning, and around noon time, she started to get confused.  Offers no other complaints.  At baseline pt has no cognitive impairment per family. Significant labs/vitals for BP at 213/94 on presentation, WBC 15, nitrite+ on UA.    Pt evaluated by neurology and ICU and found to have NIHSS of 4 due to 2 questions and aphasia on initial presentation on 8/10/23.    CT Head Non-Contrast   - No definite acute intracranial pathology. Moderate sized chronic left occipital infarct. Multiple age-indeterminate lacunar infarcts along the right coronal  radiata.     - Indeterminate confluent hypodensity in the right frontal subcortical white matter without mass effect. Nonspecific and differential can include focus of gliosis, demyelination,    chronic microvascular changes amongst others.     CT Angio Head w/ IV Cont:    - Focal occlusion in the proximal inferior M2 branch of the left MCA with diminutive enhancement in the distal branches.   - Irregular stenosis involving multiple M2 branches of the right MCA and distal right TUCKER.   - Multifocal stenosis in the bilateral posterior cerebral arteries.    MRI reveals Acute infarcts in the left temporal lobe, left parietal lobe, and left insular cortex. Patchy subacute/acute infarct in the right frontal subcortical region superimposed on small chronic lacunar infarcts. Mild chronic microvascular ischemic changes and scattered chronic infarcts.     Pt was seen by orthopedics s/p fall with ankle pain and found to have R lateral malleolus ankle fracture to be conservatively managed with CAM boot and be WBAT. Pt was initially planned for GERARDO but patient/family refused GERARDO and loop recorder placement. MCOT set up on 8/17/2023.    obtained TTE 8/10:    1. Normal global left ventricular systolic function.   2. LV Ejection Fraction by Martinez's Method with a biplane EF of 65 %.   3. Normal left atrial size.   4. There is no evidence of pericardial effusion.   5. Mild mitral valve regurgitation.    The patient was evaluated by the PM&R team once medically stable. The patient was found to have functional limitation in terms of muscle strength, endurance, physical mobility, and ability to carry out activities of daily living (self care, transfers, and ambulation). The patient was started on a course of bedside therapy, She currently requires CG for bed mobility, min assist for transfers and to ambulate with a RW 30 feet in right CAM Boot WBAT, and min assist for UE dessing, mod assist for LE dressing. The pt is motivated and able to start 3 hours of therapy  daily for 6-7 days a week. the patient was deemed to be a good candidate for admission for acute inpatient rehab. The patient was admitted to acute inpatient rehab on 8/17/23.   (17 Aug 2023 10:19)      TODAY'S SUBJECTIVE & REVIEW OF SYMPTOMS:   Pt seen and examined at bedside.  No overnight events.  No new complaints.  Voiding urine/stool spontaneously.    Tolerating therapies well.  Vitals reviewed.   labs reviewed-  (downtrending)    Pt has MCOT since 8/17/2023.    Current level of functioning  Supervision and touch assist with bed mobility and transfers; ambulated 150 ft using RW and touch assist; 20 ft using RW with close supervision.    Review of Systems:   Constitutional:    [ x  ] WNL           [   ] poor appetite   [   ] insomnia   [   ] tired   Cardio:                [x   ] WNL           [   ] CP   [   ] SARMIENTO   [   ] palpitations               Resp:                   [ x  ] WNL           [   ] SOB   [   ] cough   [   ] wheezing   GI:                        [ x  ] WNL           [   ] constipation  [   ] diarrhea   [   ] abdominal pain   [   ] nausea   [   ] emesis                                :                      [   ] WNL           [   ] ZAVALA  [   ] dysuria   [   ] difficulty voiding        [ x ]  urinary incontinence at times   Endo:                   [x   ] WNL          [   ] polyuria   [   ] temperature intolerance                 Skin:                     [x   ] WNL          [   ] pain   [   ] wound   [   ] rash   MSK:                    [    ] WNL          [   ] muscle pain   [   x] joint pain/ stiffness right ankle   [   ] muscle tenderness   [   ] swelling   Neuro:                 [   ] WNL [ x ] per HPI          [   ] HA   [   ] change in vision   [   ] tremor   [   ] weakness   [   ]dysphagia             Psych:                     [ x ] mood/ affect WNL      Physical Exam:     Vital Signs (24 Hrs):  T(C): 36.8 (08-28-23 @ 05:45), Max: 36.8 (08-27-23 @ 13:21)  HR: 75 (08-28-23 @ 05:45) (68 - 77)  BP: 118/59 (08-28-23 @ 05:45) (118/59 - 134/63)  RR: 18 (08-28-23 @ 05:45) (18 - 18)  SpO2: --  Wt(kg): --  Daily     Daily     I&O's Summary      General:[x   ] NAD, Resting Comfortable,   [   ] other:                                HEENT: [ x  ] NC/AT, EOMI, PERRL , Normal Conjunctivae,   [   ] other:  Cardio: [ x  ] RRR, no murmer,   [   ] other:                              Pulm: [ x  ] No Respiratory Distress,  Lungs CTAB,   [   ] other:                       Abdomen: [   ]ND/NT, Soft,   [   ] other:    : [ x  ] NO ZAVALA CATHETER, [   ] ZAVALA CATHETER- no meatal tear, no discharge, [   ] other:                                            MSK: [   ] No joint swelling, Full ROM,   [ x  ] other:  right lateral ankle swelling and tenderness                                       Ext: [ x  ]No C/C/E, No calf tenderness,   [   ]other:    Skin: [x   ]intact,   [   ] other:                                                                   Neurological Examination:  Cognitive: [    ] AAO x 3,   [ x   ]  other:  oriented to self and place, year with choices (impacted by aphasia)                                                            Attention:  [ x   ] intact,   [    ]  other:                            Memory: [    ] intact,    [  x  ]  other:   limited by aphasia  Mood/Affect: [   x ] wnl,    [    ]  other:                                                                             Communication: [    ]Fluent, no dysarthria, following commands:  [  x  ] other: good comprehension, mild to moderate nonfluent expressive impairment - improving  CN II - XII:  [  x  ] intact,  [    ] other:                                                                                        Motor:   RIGHT UE: [x   ] WNL,  [   ] other:  LEFT    UE: [ x  ] WNL,  [   ] other:  RIGHT LE: [x   ] WNL,  [   ] other:   LEFT    LE: [ x  ] WNL,  [   ] other:    Tone: [x    ] wnl,   [    ]  other:  DTRs: [ x  ]symmetric, [   ] other:  Coordination:   [    ] intact,   [  x  ] other:  mild decrease both legs                                                                         Sensory: [   x ] Intact to light touch,   [    ] other: Cognitive:           [   ] WNL           [ x  ]confusion  with recnet UTI, not baseline    MEDICATIONS  (STANDING):  amLODIPine   Tablet 10 milliGRAM(s) Oral daily  aspirin enteric coated 81 milliGRAM(s) Oral daily  atorvastatin 80 milliGRAM(s) Oral at bedtime  chlorhexidine 2% Cloths 1 Application(s) Topical <User Schedule>  clopidogrel Tablet 75 milliGRAM(s) Oral daily  heparin   Injectable 5000 Unit(s) SubCutaneous every 12 hours  pantoprazole    Tablet 40 milliGRAM(s) Oral before breakfast  polyethylene glycol 3350 17 Gram(s) Oral two times a day  senna 2 Tablet(s) Oral at bedtime    MEDICATIONS  (PRN):  acetaminophen     Tablet .. 650 milliGRAM(s) Oral every 6 hours PRN Temp greater or equal to 38C (100.4F), Mild Pain (1 - 3)  bisacodyl 10 milliGRAM(s) Oral at bedtime PRN Constipation  ondansetron   Disintegrating Tablet 4 milliGRAM(s) Oral every 8 hours PRN Nausea and/or Vomiting        RECENT LABS/IMAGING                        14.5   8.40  )-----------( 266      ( 28 Aug 2023 08:02 )             43.1     08-28    141  |  110  |  30<H>  ----------------------------<  100<H>  4.5   |  20  |  1.1    Ca    9.4      28 Aug 2023 08:02  Mg     2.2     08-28    TPro  6.7  /  Alb  3.9  /  TBili  0.7  /  DBili  x   /  AST  28  /  ALT  37  /  AlkPhos  181<H>  08-28      Urinalysis Basic - ( 28 Aug 2023 08:02 )    Color: x / Appearance: x / SG: x / pH: x  Gluc: 100 mg/dL / Ketone: x  / Bili: x / Urobili: x   Blood: x / Protein: x / Nitrite: x   Leuk Esterase: x / RBC: x / WBC x   Sq Epi: x / Non Sq Epi: x / Bacteria: x                              14.0   9.73  )-----------( 253      ( 21 Aug 2023 07:06 )             41.4     08-21    142  |  109  |  30<H>  ----------------------------<  93  4.3   |  22  |  1.0    Ca    9.1      21 Aug 2023 07:06  Mg     2.2     08-21    TPro  6.2  /  Alb  3.7  /  TBili  0.7  /  DBili  x   /  AST  32  /  ALT  47<H>  /  AlkPhos  191<H>  08-21     Patient is a 78y old  Female who presents with a chief complaint of Acute rehabilitation of gait disorder secondary to multiple intracranial vascular territory infarcts, likely 2/2 thromboembolic event in setting of intracranial vasculopathy and right ankle fracture of lateral malleolus (18 Aug 2023 11:55)      HPI:  Patient is 79 yo female with hx of AMS in the past for UTI who has not seen a physician for over 20 yrs presenting with AMS that started yesterday, and progressively worsening. As per son, patient has been doing ok, yesterday morning, and around noon time, she started to get confused.  Offers no other complaints.  At baseline pt has no cognitive impairment per family. Significant labs/vitals for BP at 213/94 on presentation, WBC 15, nitrite+ on UA.    Pt evaluated by neurology and ICU and found to have NIHSS of 4 due to 2 questions and aphasia on initial presentation on 8/10/23.    CT Head Non-Contrast   - No definite acute intracranial pathology. Moderate sized chronic left occipital infarct. Multiple age-indeterminate lacunar infarcts along the right coronal  radiata.     - Indeterminate confluent hypodensity in the right frontal subcortical white matter without mass effect. Nonspecific and differential can include focus of gliosis, demyelination,    chronic microvascular changes amongst others.     CT Angio Head w/ IV Cont:    - Focal occlusion in the proximal inferior M2 branch of the left MCA with diminutive enhancement in the distal branches.   - Irregular stenosis involving multiple M2 branches of the right MCA and distal right TUCKER.   - Multifocal stenosis in the bilateral posterior cerebral arteries.    MRI reveals Acute infarcts in the left temporal lobe, left parietal lobe, and left insular cortex. Patchy subacute/acute infarct in the right frontal subcortical region superimposed on small chronic lacunar infarcts. Mild chronic microvascular ischemic changes and scattered chronic infarcts.     Pt was seen by orthopedics s/p fall with ankle pain and found to have R lateral malleolus ankle fracture to be conservatively managed with CAM boot and be WBAT. Pt was initially planned for GERARDO but patient/family refused GERARDO and loop recorder placement. MCOT set up on 8/17/2023.    obtained TTE 8/10:    1. Normal global left ventricular systolic function.   2. LV Ejection Fraction by Martinez's Method with a biplane EF of 65 %.   3. Normal left atrial size.   4. There is no evidence of pericardial effusion.   5. Mild mitral valve regurgitation.    The patient was evaluated by the PM&R team once medically stable. The patient was found to have functional limitation in terms of muscle strength, endurance, physical mobility, and ability to carry out activities of daily living (self care, transfers, and ambulation). The patient was started on a course of bedside therapy, She currently requires CG for bed mobility, min assist for transfers and to ambulate with a RW 30 feet in right CAM Boot WBAT, and min assist for UE dessing, mod assist for LE dressing. The pt is motivated and able to start 3 hours of therapy  daily for 6-7 days a week. the patient was deemed to be a good candidate for admission for acute inpatient rehab. The patient was admitted to acute inpatient rehab on 8/17/23.   (17 Aug 2023 10:19)      TODAY'S SUBJECTIVE & REVIEW OF SYMPTOMS:   Pt seen and examined at bedside.  No overnight events.  No new complaints.  Voiding urine/stool spontaneously.    Tolerating therapies well.  Vitals reviewed.   labs reviewed-  (downtrending)    Pt has MCOT since 8/17/2023.    Current level of functioning  Supervision and touch assist with bed mobility and transfers; ambulated 150 ft using RW and touch assist    Review of Systems:   Constitutional:    [ x  ] WNL           [   ] poor appetite   [   ] insomnia   [   ] tired   Cardio:                [x   ] WNL           [   ] CP   [   ] SARMIENTO   [   ] palpitations               Resp:                   [ x  ] WNL           [   ] SOB   [   ] cough   [   ] wheezing   GI:                        [ x  ] WNL           [   ] constipation  [   ] diarrhea   [   ] abdominal pain   [   ] nausea   [   ] emesis                                :                      [   ] WNL           [   ] ZAVALA  [   ] dysuria   [   ] difficulty voiding        [ x ]  urinary incontinence at times   Endo:                   [x   ] WNL          [   ] polyuria   [   ] temperature intolerance                 Skin:                     [x   ] WNL          [   ] pain   [   ] wound   [   ] rash   MSK:                    [    ] WNL          [   ] muscle pain   [   x] joint pain/ stiffness right ankle   [   ] muscle tenderness   [   ] swelling   Neuro:                 [   ] WNL [ x ] per HPI          [   ] HA   [   ] change in vision   [   ] tremor   [   ] weakness   [   ]dysphagia             Psych:                     [ x ] mood/ affect WNL      Physical Exam:     Vital Signs (24 Hrs):  T(C): 36.8 (08-28-23 @ 05:45), Max: 36.8 (08-27-23 @ 13:21)  HR: 75 (08-28-23 @ 05:45) (68 - 77)  BP: 118/59 (08-28-23 @ 05:45) (118/59 - 134/63)  RR: 18 (08-28-23 @ 05:45) (18 - 18)      General:  [x  ] NAD, Resting Comfortable,   [   ] other:                                HEENT: [ x  ] NC/AT, EOMI, PERRL , Normal Conjunctivae,   [   ] other:  Cardio: [ x  ] RRR, no murmer,   [   ] other:                              Pulm: [ x  ] No Respiratory Distress,  Lungs CTAB,   [   ] other:                       Abdomen: [   ]ND/NT, Soft,   [   ] other:    : [ x  ] NO ZAVALA CATHETER, [   ] ZAVALA CATHETER- no meatal tear, no discharge, [   ] other:                                            MSK: [   ] No joint swelling, Full ROM,   [ x  ] other:  right lateral ankle swelling and tenderness                                       Ext: [ x  ]No C/C/E, No calf tenderness,   [   ]other:    Skin: [x   ]intact,   [   ] other:                                                                   Neurological Examination:  Cognitive: [    ] AAO x 3,   [ x   ]  other:  oriented to self and place, year with choices (impacted by aphasia)                                                            Attention:  [ x   ] intact,   [    ]  other:                            Memory: [    ] intact,    [  x  ]  other:   limited by aphasia  Mood/Affect: [   x ] wnl,    [    ]  other:                                                                             Communication: [    ]Fluent, no dysarthria, following commands:  [  x  ] other: good comprehension, mild to moderate nonfluent expressive impairment - improving  CN II - XII:  [  x  ] intact,  [    ] other:                                                                                        Motor:   RIGHT UE: [x   ] WNL,  [   ] other:  LEFT    UE: [ x  ] WNL,  [   ] other:  RIGHT LE: [x   ] WNL,  [   ] other:   LEFT    LE: [ x  ] WNL,  [   ] other:    Tone: [x    ] wnl,   [    ]  other:  DTRs: [ x  ]symmetric, [   ] other:  Coordination:   [    ] intact,   [  x  ] other:  mild decrease both legs                                                                         Sensory: [   x ] Intact to light touch,   [    ] other: Cognitive:           [   ] WNL           [ x  ]confusion  with recnet UTI, not baseline    MEDICATIONS  (STANDING):  amLODIPine   Tablet 10 milliGRAM(s) Oral daily  aspirin enteric coated 81 milliGRAM(s) Oral daily  atorvastatin 80 milliGRAM(s) Oral at bedtime  chlorhexidine 2% Cloths 1 Application(s) Topical <User Schedule>  clopidogrel Tablet 75 milliGRAM(s) Oral daily  heparin   Injectable 5000 Unit(s) SubCutaneous every 12 hours  pantoprazole    Tablet 40 milliGRAM(s) Oral before breakfast  polyethylene glycol 3350 17 Gram(s) Oral two times a day  senna 2 Tablet(s) Oral at bedtime    MEDICATIONS  (PRN):  acetaminophen     Tablet .. 650 milliGRAM(s) Oral every 6 hours PRN Temp greater or equal to 38C (100.4F), Mild Pain (1 - 3)  bisacodyl 10 milliGRAM(s) Oral at bedtime PRN Constipation  ondansetron   Disintegrating Tablet 4 milliGRAM(s) Oral every 8 hours PRN Nausea and/or Vomiting        RECENT LABS/IMAGING                        14.5   8.40  )-----------( 266      ( 28 Aug 2023 08:02 )             43.1     08-28    141  |  110  |  30<H>  ----------------------------<  100<H>  4.5   |  20  |  1.1    Ca    9.4      28 Aug 2023 08:02  Mg     2.2     08-28    TPro  6.7  /  Alb  3.9  /  TBili  0.7  /  DBili  x   /  AST  28  /  ALT  37  /  AlkPhos  181<H>  08-28                              14.0   9.73  )-----------( 253      ( 21 Aug 2023 07:06 )             41.4     08-21    142  |  109  |  30<H>  ----------------------------<  93  4.3   |  22  |  1.0    Ca    9.1      21 Aug 2023 07:06  Mg     2.2     08-21    TPro  6.2  /  Alb  3.7  /  TBili  0.7  /  DBili  x   /  AST  32  /  ALT  47<H>  /  AlkPhos  191<H>  08-21

## 2023-08-28 NOTE — PROGRESS NOTE ADULT - ASSESSMENT
Neuropsychology Follow-Up    Treatment Session Focused on Cognition    Pain: No   Location: N/A Ratin/10 Intervention: N/A   Orientation: Correct for person, place, and time of day(incorrect for day of week, date, month, and year)   Arousal Level: Alert   Behavior: Pleasant, cooperative   Affect Range:  [facility]    Needed: No   Attention: [facility]   Insight into illness/deficits: Fair+     Patient was seen for a session and the following neuropsychological measures were given: Cognistat and Clock Drawing Test.     Results:  Leaning/Memory: Patient learned 4/4 list items in a verbal list learning task in 5 trials. She spontaneously recalled 3/4 items after a short delay, and did not benefit from cueing, as she was unable to recall the last item. She was unable to recognize the remaining target word when given multiple-choice options. Her immediate visual memory was poor as she was unable to correctly draw 1 of 2 geometrical shapes that she studied for a brief period.       Attention: Basic auditory attention was fair, as patient reproduced strings of digits of 4 numbers forward.      Executive Functioning/Working Memory: Patient was able to produce strings of digits in reverse order of 3 digits in length. She accurately completed a 3-step command. She completed 4/5 mental math problems accurately.  Clock drawing was 8/10 (contour=2/2; numbers=¾ for minor visuospatial distortion in the arrangement of the numbers; hands=¾ for minor visuosptail distortion in placement).  Patient produced 2/2 full-credit responses on questions related to safety and judgment. She gave 1/4 correct responses to abstract similarities, however, required some orientation to the task and her answers were concrete and imprecise. Difficulties in verbal abstraction on similarities appeared at least to a degreee related to word-finding difficulties.     Language: Patient repeated 3/7 sentences accurately. Confrontation naming was accurate (7/8 pictured items correctly named). Her description of a complex visual scene was accurate and demonstrative of adequate expression and visual scanning.  [place] word-finding difficulties and phonemic paraphasias were observed throughout the session.     Visuospatial skills: Patient’s performance on tasks involving visual scanning and drawing did not evidence attentional difficulties. As above, the patient’s representation of a clock to command was accurate with minor visuopatial distortion in the placement of the numbers and the hands, and her verbal description of a complex visual scene evidenced no difficulties in visual scanning.     Summary:  Overall, the current evaluation evidences at least some difficulty in her memory storage/retrieval and select aspects of frontal/executive abilities, which is consistent with her initial cognistat administration.  While there appears to be some improvement in performance since initial evaluation, results should be interpreted with caution as linguistic involvement may confound test performance on measures of verbal attention, learning and memory, and working memory.  [place] word-finding difficulties and phonemic paraphasic errors were observed throughout the session and were evident in verbal registration trials, repetition, and naming tasks.  Etiology of difficulties is stroke, which is consistent with diffusely depressed cognitive functioning/ frontal/subcortical pattern of results, such as cognitive slowing, and language processing/repetition difficulties.  The patient will likely benefit from memory aides such as reminders, presenting information in smaller chunks, reducing distractions, and encouragement. The plan is to continue to support cognition.   Additional time today spent discussing adjustment to unit, continued 4A stay, and responses to upcoming discharge.  Perceived barriers to continued post-discharge adjustment and recovery were elicited and troubleshot in session.     Goals: Monitor cognition, support positive coping/adjustment/mood   Plan: 1-2x weekly at 30-60minutes

## 2023-08-28 NOTE — DISCHARGE NOTE PROVIDER - CARE PROVIDER_API CALL
Enoc Perez  Orthopaedic Surgery  [address] [place] [address]  Phone: [phone]  Fax: [phone]  Follow Up Time: 2 weeks    Yamilet Silva  Neurology  [address] [place] [address]  Phone: [phone]  Fax: [phone]  Follow Up Time: 2 weeks   Enoc Perez  Orthopaedic Surgery  [address] [place] [address]  Phone: [phone]  Fax: [phone]  Follow Up Time: 2 weeks    Yamilet Silva  Neurology  [address] [place] [address]  Phone: [phone]  Fax: [phone]  Follow Up Time: 2 weeks    Lucia Jones  Geriatric Medicine  [address] [place] [address]  Phone: [phone]  Fax: [phone]  Follow Up Time:     EP cardiologist,   Phone: (   )    -  Fax: (   )    -  Follow Up Time:    Enoc Perez  Orthopaedic Surgery  [address] [place] [address]  Phone: [phone]  Fax: [phone]  Follow Up Time: 2 weeks    Yamilet Silva  Neurology  [address] [place] [address]  Phone: [phone]  Fax: [phone]  Scheduled Appointment: 09/26/2023    Lucia Jones  Geriatric Medicine  [address] [place] [address]  Phone: [phone]  Fax: [phone]  Scheduled Appointment: 09/21/2023 02:15 PM    EP cardiologist,   Phone: (   )    -  Fax: (   )    -  Follow Up Time:

## 2023-08-28 NOTE — DISCHARGE NOTE PROVIDER - NSDCFUSCHEDAPPT_GEN_ALL_CORE_FT
[facility]  NEUROLOGY [address] [facility]  Scheduled Appointment: 09/26/2023

## 2023-08-28 NOTE — DISCHARGE NOTE PROVIDER - NSDCFUADDINST_GEN_ALL_CORE_FT
*** PLEASE SHOW THESE DISCHARGE PAPERS TO ALL YOUR HEALTH CARE PROVIDERS AND CAREGIVERS ***    ***AMBULATE WITH THE ROLLING WALKER AND SUPERVISION/TOUCH ASSIST FOR YOUR SAFETY, YOU CAN WEIGHT-BEAR AS TOLERATED ON YOUR RIGHT LOWER EXTREMITY  WITH THE CAM BOOT ***

## 2023-08-29 ENCOUNTER — TRANSCRIPTION ENCOUNTER (OUTPATIENT)
Age: 78
End: 2023-08-29

## 2023-08-29 RX ORDER — POLYETHYLENE GLYCOL 3350 17 G/17G
17 POWDER, FOR SOLUTION ORAL
Refills: 0 | Status: DISCONTINUED | OUTPATIENT
Start: 2023-08-29 | End: 2023-08-31

## 2023-08-29 RX ADMIN — HEPARIN SODIUM 5000 UNIT(S): 5000 INJECTION INTRAVENOUS; SUBCUTANEOUS at 06:17

## 2023-08-29 RX ADMIN — HEPARIN SODIUM 5000 UNIT(S): 5000 INJECTION INTRAVENOUS; SUBCUTANEOUS at 17:22

## 2023-08-29 RX ADMIN — Medication 81 MILLIGRAM(S): at 12:24

## 2023-08-29 RX ADMIN — ATORVASTATIN CALCIUM 80 MILLIGRAM(S): 80 TABLET, FILM COATED ORAL at 22:02

## 2023-08-29 RX ADMIN — CHLORHEXIDINE GLUCONATE 1 APPLICATION(S): 213 SOLUTION TOPICAL at 06:17

## 2023-08-29 RX ADMIN — CLOPIDOGREL BISULFATE 75 MILLIGRAM(S): 75 TABLET, FILM COATED ORAL at 12:25

## 2023-08-29 RX ADMIN — AMLODIPINE BESYLATE 10 MILLIGRAM(S): 2.5 TABLET ORAL at 06:17

## 2023-08-29 RX ADMIN — PANTOPRAZOLE SODIUM 40 MILLIGRAM(S): 20 TABLET, DELAYED RELEASE ORAL at 06:17

## 2023-08-29 NOTE — DISCHARGE NOTE NURSING/CASE MANAGEMENT/SOCIAL WORK - PATIENT PORTAL LINK FT
You can access the FollowMyHealth Patient Portal offered by [facility] by registering at the following website: [email or web address]. By joining [facility]’s FollowMyHealth portal, you will also be able to view your health information using other applications (apps) compatible with our system.

## 2023-08-29 NOTE — PROGRESS NOTE ADULT - SUBJECTIVE AND OBJECTIVE BOX
Patient is a 78y old  Female who presents with a chief complaint of Acute rehabilitation of gait disorder secondary to multiple intracranial vascular territory infarcts, likely 2/2 thromboembolic event in setting of intracranial vasculopathy and right ankle fracture of lateral malleolus (18 Aug 2023 11:55)      HPI:  Patient is 77 yo female with hx of AMS in the past for UTI who has not seen a physician for over 20 yrs presenting with AMS that started yesterday, and progressively worsening. As per son, patient has been doing ok, yesterday morning, and around noon time, she started to get confused.  Offers no other complaints.  At baseline pt has no cognitive impairment per family. Significant labs/vitals for BP at 213/94 on presentation, WBC 15, nitrite+ on UA.    Pt evaluated by neurology and ICU and found to have NIHSS of 4 due to 2 questions and aphasia on initial presentation on 8/10/23.    CT Head Non-Contrast   - No definite acute intracranial pathology. Moderate sized chronic left occipital infarct. Multiple age-indeterminate lacunar infarcts along the right coronal  radiata.     - Indeterminate confluent hypodensity in the right frontal subcortical white matter without mass effect. Nonspecific and differential can include focus of gliosis, demyelination,    chronic microvascular changes amongst others.     CT Angio Head w/ IV Cont:    - Focal occlusion in the proximal inferior M2 branch of the left MCA with diminutive enhancement in the distal branches.   - Irregular stenosis involving multiple M2 branches of the right MCA and distal right TUCKER.   - Multifocal stenosis in the bilateral posterior cerebral arteries.    MRI reveals Acute infarcts in the left temporal lobe, left parietal lobe, and left insular cortex. Patchy subacute/acute infarct in the right frontal subcortical region superimposed on small chronic lacunar infarcts. Mild chronic microvascular ischemic changes and scattered chronic infarcts.     Pt was seen by orthopedics s/p fall with ankle pain and found to have R lateral malleolus ankle fracture to be conservatively managed with CAM boot and be WBAT. Pt was initially planned for GERARDO but patient/family refused GERARDO and loop recorder placement. MCOT set up on 8/17/2023.    obtained TTE 8/10:    1. Normal global left ventricular systolic function.   2. LV Ejection Fraction by Martinez's Method with a biplane EF of 65 %.   3. Normal left atrial size.   4. There is no evidence of pericardial effusion.   5. Mild mitral valve regurgitation.    The patient was evaluated by the PM&R team once medically stable. The patient was found to have functional limitation in terms of muscle strength, endurance, physical mobility, and ability to carry out activities of daily living (self care, transfers, and ambulation). The patient was started on a course of bedside therapy, She currently requires CG for bed mobility, min assist for transfers and to ambulate with a RW 30 feet in right CAM Boot WBAT, and min assist for UE dessing, mod assist for LE dressing. The pt is motivated and able to start 3 hours of therapy  daily for 6-7 days a week. the patient was deemed to be a good candidate for admission for acute inpatient rehab. The patient was admitted to acute inpatient rehab on 8/17/23.   (17 Aug 2023 10:19)      TODAY'S SUBJECTIVE & REVIEW OF SYMPTOMS:   Pt seen and examined at bedside.  No overnight events.  No new complaints.  Voiding urine/stool spontaneously.    Tolerating therapies well.  Vitals reviewed.     Pt has MCOT since 8/17/2023.    Current level of functioning  Supervision and touch assist with bed mobility and transfers; ambulated 150 ft using RW and touch assist    Review of Systems:   Constitutional:    [ x  ] WNL           [   ] poor appetite   [   ] insomnia   [   ] tired   Cardio:                [x   ] WNL           [   ] CP   [   ] SARMIENTO   [   ] palpitations               Resp:                   [ x  ] WNL           [   ] SOB   [   ] cough   [   ] wheezing   GI:                        [ x  ] WNL           [   ] constipation  [   ] diarrhea   [   ] abdominal pain   [   ] nausea   [   ] emesis                                :                      [   ] WNL           [   ] ZAVALA  [   ] dysuria   [   ] difficulty voiding        [ x ]  urinary incontinence at times   Endo:                   [x   ] WNL          [   ] polyuria   [   ] temperature intolerance                 Skin:                     [x   ] WNL          [   ] pain   [   ] wound   [   ] rash   MSK:                    [    ] WNL          [   ] muscle pain   [   x] joint pain/ stiffness right ankle   [   ] muscle tenderness   [   ] swelling   Neuro:                 [   ] WNL [ x ] per HPI          [   ] HA   [   ] change in vision   [   ] tremor   [   ] weakness   [   ]dysphagia             Psych:                     [ x ] mood/ affect WNL      Physical Exam:   Vital Signs (24 Hrs):  T(C): 36.7 (08-29-23 @ 05:49), Max: 36.8 (08-28-23 @ 21:09)  HR: 68 (08-29-23 @ 05:49) (68 - 69)  BP: 122/60 (08-29-23 @ 05:49) (122/60 - 140/65)  RR: 20 (08-29-23 @ 05:49) (18 - 20)  SpO2: --  Wt(kg): --  Daily     Daily     I&O's Summary      General:  [x  ] NAD, Resting Comfortable,   [   ] other:                                HEENT: [ x  ] NC/AT, EOMI, PERRL , Normal Conjunctivae,   [   ] other:  Cardio: [ x  ] RRR, no murmer,   [   ] other:                              Pulm: [ x  ] No Respiratory Distress,  Lungs CTAB,   [   ] other:                       Abdomen: [   ]ND/NT, Soft,   [   ] other:    : [ x  ] NO ZAVALA CATHETER, [   ] ZAVALA CATHETER- no meatal tear, no discharge, [   ] other:                                            MSK: [   ] No joint swelling, Full ROM,   [ x  ] other:  right lateral ankle swelling and tenderness                                       Ext: [ x  ]No C/C/E, No calf tenderness,   [   ]other:    Skin: [x   ]intact,   [   ] other:                                                                   Neurological Examination:  Cognitive: [    ] AAO x 3,   [ x   ]  other:  oriented to self and place, year with choices (impacted by aphasia)                                                            Attention:  [ x   ] intact,   [    ]  other:                            Memory: [    ] intact,    [  x  ]  other:   limited by aphasia  Mood/Affect: [   x ] wnl,    [    ]  other:                                                                             Communication: [    ]Fluent, no dysarthria, following commands:  [  x  ] other: good comprehension, mild to moderate nonfluent expressive impairment - improving  CN II - XII:  [  x  ] intact,  [    ] other:                                                                                        Motor:   RIGHT UE: [x   ] WNL,  [   ] other:  LEFT    UE: [ x  ] WNL,  [   ] other:  RIGHT LE: [x   ] WNL,  [   ] other:   LEFT    LE: [ x  ] WNL,  [   ] other:    Tone: [x    ] wnl,   [    ]  other:  DTRs: [ x  ]symmetric, [   ] other:  Coordination:   [    ] intact,   [  x  ] other:  mild decrease both legs                                                                         Sensory: [   x ] Intact to light touch,   [    ] other: Cognitive:           [   ] WNL           [ x  ]confusion  with recnet UTI, not baseline    MEDICATIONS  (STANDING):  amLODIPine   Tablet 10 milliGRAM(s) Oral daily  aspirin enteric coated 81 milliGRAM(s) Oral daily  atorvastatin 80 milliGRAM(s) Oral at bedtime  chlorhexidine 2% Cloths 1 Application(s) Topical <User Schedule>  clopidogrel Tablet 75 milliGRAM(s) Oral daily  heparin   Injectable 5000 Unit(s) SubCutaneous every 12 hours  pantoprazole    Tablet 40 milliGRAM(s) Oral before breakfast  polyethylene glycol 3350 17 Gram(s) Oral two times a day  senna 2 Tablet(s) Oral at bedtime    MEDICATIONS  (PRN):  acetaminophen     Tablet .. 650 milliGRAM(s) Oral every 6 hours PRN Temp greater or equal to 38C (100.4F), Mild Pain (1 - 3)  bisacodyl 10 milliGRAM(s) Oral at bedtime PRN Constipation  ondansetron   Disintegrating Tablet 4 milliGRAM(s) Oral every 8 hours PRN Nausea and/or Vomiting        RECENT LABS/IMAGING                        14.5   8.40  )-----------( 266      ( 28 Aug 2023 08:02 )             43.1     08-28    141  |  110  |  30<H>  ----------------------------<  100<H>  4.5   |  20  |  1.1    Ca    9.4      28 Aug 2023 08:02  Mg     2.2     08-28    TPro  6.7  /  Alb  3.9  /  TBili  0.7  /  DBili  x   /  AST  28  /  ALT  37  /  AlkPhos  181<H>  08-28                              14.0   9.73  )-----------( 253      ( 21 Aug 2023 07:06 )             41.4     08-21    142  |  109  |  30<H>  ----------------------------<  93  4.3   |  22  |  1.0    Ca    9.1      21 Aug 2023 07:06  Mg     2.2     08-21    TPro  6.2  /  Alb  3.7  /  TBili  0.7  /  DBili  x   /  AST  32  /  ALT  47<H>  /  AlkPhos  191<H>  08-21     Patient is a 78y old  Female who presents with a chief complaint of Acute rehabilitation of gait disorder secondary to multiple intracranial vascular territory infarcts, likely 2/2 thromboembolic event in setting of intracranial vasculopathy and right ankle fracture of lateral malleolus (18 Aug 2023 11:55)      HPI:  Patient is 77 yo female with hx of AMS in the past for UTI who has not seen a physician for over 20 yrs presenting with AMS that started yesterday, and progressively worsening. As per son, patient has been doing ok, yesterday morning, and around noon time, she started to get confused.  Offers no other complaints.  At baseline pt has no cognitive impairment per family. Significant labs/vitals for BP at 213/94 on presentation, WBC 15, nitrite+ on UA.    Pt evaluated by neurology and ICU and found to have NIHSS of 4 due to 2 questions and aphasia on initial presentation on 8/10/23.    CT Head Non-Contrast   - No definite acute intracranial pathology. Moderate sized chronic left occipital infarct. Multiple age-indeterminate lacunar infarcts along the right coronal  radiata.     - Indeterminate confluent hypodensity in the right frontal subcortical white matter without mass effect. Nonspecific and differential can include focus of gliosis, demyelination,    chronic microvascular changes amongst others.     CT Angio Head w/ IV Cont:    - Focal occlusion in the proximal inferior M2 branch of the left MCA with diminutive enhancement in the distal branches.   - Irregular stenosis involving multiple M2 branches of the right MCA and distal right TUCKER.   - Multifocal stenosis in the bilateral posterior cerebral arteries.    MRI reveals Acute infarcts in the left temporal lobe, left parietal lobe, and left insular cortex. Patchy subacute/acute infarct in the right frontal subcortical region superimposed on small chronic lacunar infarcts. Mild chronic microvascular ischemic changes and scattered chronic infarcts.     Pt was seen by orthopedics s/p fall with ankle pain and found to have R lateral malleolus ankle fracture to be conservatively managed with CAM boot and be WBAT. Pt was initially planned for GERARDO but patient/family refused GERARDO and loop recorder placement. MCOT set up on 8/17/2023.    obtained TTE 8/10:    1. Normal global left ventricular systolic function.   2. LV Ejection Fraction by Martinez's Method with a biplane EF of 65 %.   3. Normal left atrial size.   4. There is no evidence of pericardial effusion.   5. Mild mitral valve regurgitation.    The patient was evaluated by the PM&R team once medically stable. The patient was found to have functional limitation in terms of muscle strength, endurance, physical mobility, and ability to carry out activities of daily living (self care, transfers, and ambulation). The patient was started on a course of bedside therapy, She currently requires CG for bed mobility, min assist for transfers and to ambulate with a RW 30 feet in right CAM Boot WBAT, and min assist for UE dessing, mod assist for LE dressing. The pt is motivated and able to start 3 hours of therapy  daily for 6-7 days a week. the patient was deemed to be a good candidate for admission for acute inpatient rehab. The patient was admitted to acute inpatient rehab on 8/17/23.   (17 Aug 2023 10:19)      TODAY'S SUBJECTIVE & REVIEW OF SYMPTOMS:   Pt seen and examined at bedside.  No overnight events.  No new complaints.  Voiding urine/stool spontaneously.    Tolerating therapies well.  Vitals reviewed.     Pt has MCOT since 8/17/2023.    Current level of functioning  Supervision and touch assist with bed mobility and transfers; ambulated 150 ft using RW and touch assist    Review of Systems:   Constitutional:    [ x  ] WNL           [   ] poor appetite   [   ] insomnia   [   ] tired   Cardio:                [x   ] WNL           [   ] CP   [   ] SARMIENTO   [   ] palpitations               Resp:                   [ x  ] WNL           [   ] SOB   [   ] cough   [   ] wheezing   GI:                        [ x  ] WNL           [   ] constipation  [   ] diarrhea   [   ] abdominal pain   [   ] nausea   [   ] emesis                                :                      [   ] WNL           [   ] ZAVALA  [   ] dysuria   [   ] difficulty voiding        [ x ]  urinary incontinence at times   Endo:                   [x   ] WNL          [   ] polyuria   [   ] temperature intolerance                 Skin:                     [x   ] WNL          [   ] pain   [   ] wound   [   ] rash   MSK:                    [    ] WNL          [   ] muscle pain   [   x] joint pain/ stiffness right ankle   [   ] muscle tenderness   [   ] swelling   Neuro:                 [   ] WNL [ x ] per HPI          [   ] HA   [   ] change in vision   [   ] tremor   [   ] weakness   [   ]dysphagia             Psych:                     [ x ] mood/ affect WNL      Physical Exam:   Vital Signs (24 Hrs):  T(C): 36.7 (08-29-23 @ 05:49), Max: 36.8 (08-28-23 @ 21:09)  HR: 68 (08-29-23 @ 05:49) (68 - 69)  BP: 122/60 (08-29-23 @ 05:49) (122/60 - 140/65)  RR: 20 (08-29-23 @ 05:49) (18 - 20)      General:  [x  ] NAD, Resting Comfortable,   [   ] other:                                HEENT: [ x  ] NC/AT, EOMI, PERRL , Normal Conjunctivae,   [   ] other:  Cardio: [ x  ] RRR, no murmer,   [   ] other:                              Pulm: [ x  ] No Respiratory Distress,  Lungs CTAB,   [   ] other:                       Abdomen: [   ]ND/NT, Soft,   [   ] other:    : [ x  ] NO ZAVALA CATHETER, [   ] ZAVALA CATHETER- no meatal tear, no discharge, [   ] other:                                            MSK: [   ] No joint swelling, Full ROM,   [ x  ] other:  right lateral ankle swelling and tenderness                                       Ext: [ x  ]No C/C/E, No calf tenderness,   [   ]other:    Skin: [x   ]intact,   [   ] other:                                                                   Neurological Examination:  Cognitive: [    ] AAO x 3,   [ x   ]  other:  oriented to self and place, year with choices (impacted by aphasia)                                                            Attention:  [ x   ] intact,   [    ]  other:                            Memory: [    ] intact,    [  x  ]  other:   limited by aphasia  Mood/Affect: [   x ] wnl,    [    ]  other:                                                                             Communication: [    ]Fluent, no dysarthria, following commands:  [  x  ] other: good comprehension, mild to moderate nonfluent expressive impairment - improving  CN II - XII:  [  x  ] intact,  [    ] other:                                                                                        Motor:   RIGHT UE: [x   ] WNL,  [   ] other:  LEFT    UE: [ x  ] WNL,  [   ] other:  RIGHT LE: [x   ] WNL,  [   ] other:   LEFT    LE: [ x  ] WNL,  [   ] other:    Tone: [x    ] wnl,   [    ]  other:  DTRs: [ x  ]symmetric, [   ] other:  Coordination:   [    ] intact,   [  x  ] other:  mild decrease both legs                                                                         Sensory: [   x ] Intact to light touch,   [    ] other: Cognitive:           [   ] WNL           [ x  ]confusion  with recnet UTI, not baseline    MEDICATIONS  (STANDING):  amLODIPine   Tablet 10 milliGRAM(s) Oral daily  aspirin enteric coated 81 milliGRAM(s) Oral daily  atorvastatin 80 milliGRAM(s) Oral at bedtime  chlorhexidine 2% Cloths 1 Application(s) Topical <User Schedule>  clopidogrel Tablet 75 milliGRAM(s) Oral daily  heparin   Injectable 5000 Unit(s) SubCutaneous every 12 hours  pantoprazole    Tablet 40 milliGRAM(s) Oral before breakfast  polyethylene glycol 3350 17 Gram(s) Oral two times a day  senna 2 Tablet(s) Oral at bedtime    MEDICATIONS  (PRN):  acetaminophen     Tablet .. 650 milliGRAM(s) Oral every 6 hours PRN Temp greater or equal to 38C (100.4F), Mild Pain (1 - 3)  bisacodyl 10 milliGRAM(s) Oral at bedtime PRN Constipation  ondansetron   Disintegrating Tablet 4 milliGRAM(s) Oral every 8 hours PRN Nausea and/or Vomiting        RECENT LABS/IMAGING                        14.5   8.40  )-----------( 266      ( 28 Aug 2023 08:02 )             43.1     08-28    141  |  110  |  30<H>  ----------------------------<  100<H>  4.5   |  20  |  1.1    Ca    9.4      28 Aug 2023 08:02  Mg     2.2     08-28    TPro  6.7  /  Alb  3.9  /  TBili  0.7  /  DBili  x   /  AST  28  /  ALT  37  /  AlkPhos  181<H>  08-28                              14.0   9.73  )-----------( 253      ( 21 Aug 2023 07:06 )             41.4     08-21    142  |  109  |  30<H>  ----------------------------<  93  4.3   |  22  |  1.0    Ca    9.1      21 Aug 2023 07:06  Mg     2.2     08-21    TPro  6.2  /  Alb  3.7  /  TBili  0.7  /  DBili  x   /  AST  32  /  ALT  47<H>  /  AlkPhos  191<H>  08-21

## 2023-08-29 NOTE — PROGRESS NOTE ADULT - ASSESSMENT
ASSESSMENT/PLAN  78 year old female with no known medical history (does not see a health care provider for past 20 years) brought in by son to the emergency room for confusion. As per son, patient was noted  during a phone conversation to be confused, and when he got home the confusion was worse. Unsure of last time of known welll. evaluated by neurology and ICU. Pt presented with aphasia. taken to ICU for further neuro monitoring.  labs showed leukocytosis with + nitrite on UA), s/p fall pt with R ankle fracture seen by ortho and managed conservatively with CAM boot.  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       #Rehab of gait disorder secondary to multiple intracranial vascular territory infarcts, likely 2/2 thromboembolic event in setting of intracranial vasculopathy, and right ankle fracture of lateral malleolus  #CVA; Acute Left temporal, L parietal, and L insular  - Continue DAPT 81mg ASA QD, Plavix 75mg QD, high dose statin 80mg atorvastatin 80mg qhs  - maintain SBP goals 140-180.   - continue PT/OT/SLP   - GERARDO planned for 8/14 -- refused by patient and family.   - TTE 8/10 revealed: normal LV function with EF 65%, mild MVR. no PFO or thrombus  - MCOT placed (8/17/2023)  - Making gains in mobility and speech. Continue acute rehab program.    #Aphasia  - ST/ Neuropsych following  - improving    #Azotemia   BUN 30/Cr 1.1 (8/28/2023)  - promote oral hydration   - given IVF (8/17/2023) and BUN improved to 29  - appears to be chronic, stable  - recheck in a few days    #HTN  - SBP goal per neuro 140-180  - c/w Norvasc 10mg QD  - 130- 150  - stable    #R lateral malleolus ankle fracture  - WBAT in CAM boot  - conservative non-surgical management  - Pain control tylenol 650mg q6hr PRN  - followup with orthopedics, Dr. Enoc Perez in 2 weeks orthopedics.     #s/p E.coli UTI  - completed antibiotic  - asymptomatic    #Misc  DVT ppx: SQH  Zofran for nausea  GI/bowel ppx: protonix, senna qhs  Diet: DASH/TLC diet  Code status: Full Code  -Skin: No active issues at this time    Precautions / PROPHYLAXIS:      - Falls, safety    - Ortho: Weight bearing status: WBAT with CAM boot of RLE       ASSESSMENT/PLAN  78 year old female with no known medical history (does not see a health care provider for past 20 years) brought in by son to the emergency room for confusion. As per son, patient was noted  during a phone conversation to be confused, and when he got home the confusion was worse. Unsure of last time of known welll. evaluated by neurology and ICU. Pt presented with aphasia. taken to ICU for further neuro monitoring.  labs showed leukocytosis with + nitrite on UA), s/p fall pt with R ankle fracture seen by ortho and managed conservatively with CAM boot.  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       #Rehab of gait disorder secondary to multiple intracranial vascular territory infarcts, likely 2/2 thromboembolic event in setting of intracranial vasculopathy, and right ankle fracture of lateral malleolus  #CVA; Acute Left temporal, L parietal, and L insular  - Continue DAPT 81mg ASA QD, Plavix 75mg QD, high dose statin 80mg atorvastatin 80mg qhs  - maintain SBP goals 140-180.   - continue PT/OT/SLP   - GERARDO planned for 8/14 -- refused by patient and family.   - TTE 8/10 revealed: normal LV function with EF 65%, mild MVR. no PFO or thrombus  - MCOT placed (8/17/2023)  - Making gains in mobility and speech. Continue acute rehab program.    #Aphasia  - ST/ Neuropsych following  - improving    #Azotemia   BUN 30/Cr 1.1 (8/28/2023)  - promote oral hydration   - given IVF (8/17/2023) and BUN improved to 29  - appears to be chronic, stable  - recheck in a few days    #HTN  - SBP goal per neuro 140-180  - c/w Norvasc 10mg QD  - 130- 150  - stable    #R lateral malleolus ankle fracture  - WBAT in CAM boot  - conservative non-surgical management  - Pain control tylenol 650mg q6hr PRN  - followup with orthopedics, Dr. Enoc Perez in 2 weeks orthopedics.     #s/p E.coli UTI  - completed antibiotic  - asymptomatic    #Misc  DVT ppx: SQH  Zofran for nausea  GI/bowel ppx: protonix, senna qhs  Diet: DASH/TLC diet  Code status: Full Code  -Skin: No active issues at this time    Precautions / PROPHYLAXIS:  sc Heparin    - Falls, safety    - Ortho: Weight bearing status: WBAT with CAM boot of RLE

## 2023-08-30 PROBLEM — Z78.9 OTHER SPECIFIED HEALTH STATUS: Chronic | Status: ACTIVE | Noted: 2023-08-17

## 2023-08-30 RX ORDER — CLOPIDOGREL BISULFATE 75 MG/1
1 TABLET, FILM COATED ORAL
Qty: 30 | Refills: 0
Start: 2023-08-30 | End: 2023-09-28

## 2023-08-30 RX ORDER — PANTOPRAZOLE SODIUM 20 MG/1
1 TABLET, DELAYED RELEASE ORAL
Qty: 30 | Refills: 0
Start: 2023-08-30 | End: 2023-09-28

## 2023-08-30 RX ORDER — AMLODIPINE BESYLATE 2.5 MG/1
1 TABLET ORAL
Qty: 30 | Refills: 0
Start: 2023-08-30 | End: 2023-09-28

## 2023-08-30 RX ORDER — ATORVASTATIN CALCIUM 80 MG/1
1 TABLET, FILM COATED ORAL
Qty: 30 | Refills: 0
Start: 2023-08-30 | End: 2023-09-28

## 2023-08-30 RX ADMIN — HEPARIN SODIUM 5000 UNIT(S): 5000 INJECTION INTRAVENOUS; SUBCUTANEOUS at 17:17

## 2023-08-30 RX ADMIN — ATORVASTATIN CALCIUM 80 MILLIGRAM(S): 80 TABLET, FILM COATED ORAL at 21:42

## 2023-08-30 RX ADMIN — AMLODIPINE BESYLATE 10 MILLIGRAM(S): 2.5 TABLET ORAL at 06:35

## 2023-08-30 RX ADMIN — PANTOPRAZOLE SODIUM 40 MILLIGRAM(S): 20 TABLET, DELAYED RELEASE ORAL at 06:35

## 2023-08-30 RX ADMIN — CHLORHEXIDINE GLUCONATE 1 APPLICATION(S): 213 SOLUTION TOPICAL at 06:34

## 2023-08-30 RX ADMIN — Medication 81 MILLIGRAM(S): at 11:48

## 2023-08-30 RX ADMIN — CLOPIDOGREL BISULFATE 75 MILLIGRAM(S): 75 TABLET, FILM COATED ORAL at 11:48

## 2023-08-30 RX ADMIN — HEPARIN SODIUM 5000 UNIT(S): 5000 INJECTION INTRAVENOUS; SUBCUTANEOUS at 06:34

## 2023-08-30 NOTE — PROGRESS NOTE ADULT - ASSESSMENT
ASSESSMENT/PLAN  78 year old female with no known medical history (does not see a health care provider for past 20 years) brought in by son to the emergency room for confusion. As per son, patient was noted  during a phone conversation to be confused, and when he got home the confusion was worse. Unsure of last time of known welll. evaluated by neurology and ICU. Pt presented with aphasia. taken to ICU for further neuro monitoring.  labs showed leukocytosis with + nitrite on UA), s/p fall pt with R ankle fracture seen by ortho and managed conservatively with CAM boot.  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       #Rehab of gait disorder secondary to multiple intracranial vascular territory infarcts, likely 2/2 thromboembolic event in setting of intracranial vasculopathy, and right ankle fracture of lateral malleolus  #CVA; Acute Left temporal, L parietal, and L insular  - Continue DAPT 81mg ASA QD, Plavix 75mg QD, high dose statin 80mg atorvastatin 80mg qhs  - maintain SBP goals 140-180.   - continue PT/OT/SLP   - GERARDO planned for 8/14 -- refused by patient and family.   - TTE 8/10 revealed: normal LV function with EF 65%, mild MVR. no PFO or thrombus  - MCOT placed (8/17/2023)  - Making gains in mobility and speech. Continue acute rehab program.  - dc tomorrow    #Aphasia  - ST/ Neuropsych following  - improving    #Azotemia   BUN 30/Cr 1.1 (8/28/2023)  - promote oral hydration   - given IVF (8/17/2023) and BUN improved to 29  - appears to be chronic, stable  - recheck in a few days    #HTN  - SBP goal per neuro 140-180  - c/w Norvasc 10mg QD  - 130- 150  - stable    #R lateral malleolus ankle fracture  - WBAT in CAM boot  - conservative non-surgical management  - Pain control tylenol 650mg q6hr PRN  - followup with orthopedics, Dr. Enoc Perez in 2 weeks orthopedics.     #s/p E.coli UTI  - completed antibiotic  - asymptomatic    #Misc  DVT ppx: SQH  Zofran for nausea  GI/bowel ppx: protonix, senna qhs  Diet: DASH/TLC diet  Code status: Full Code  -Skin: No active issues at this time    Precautions / PROPHYLAXIS:  sc Heparin    - Falls, safety    - Ortho: Weight bearing status: WBAT with CAM boot of RLE       ASSESSMENT/PLAN  78 year old female with no known medical history (does not see a health care provider for past 20 years) brought in by son to the emergency room for confusion. As per son, patient was noted  during a phone conversation to be confused, and when he got home the confusion was worse. Unsure of last time of known welll. evaluated by neurology and ICU. Pt presented with aphasia. taken to ICU for further neuro monitoring.  labs showed leukocytosis with + nitrite on UA), s/p fall pt with R ankle fracture seen by ortho and managed conservatively with CAM boot.  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       #Rehab of gait disorder secondary to multiple intracranial vascular territory infarcts, likely 2/2 thromboembolic event in setting of intracranial vasculopathy, and right ankle fracture of lateral malleolus  #CVA; Acute Left temporal, L parietal, and L insular  - Continue DAPT 81mg ASA QD, Plavix 75mg QD, high dose statin 80mg atorvastatin 80mg qhs  - maintain SBP goals 140-180.   - continue PT/OT/SLP   - GERARDO planned for 8/14 -- refused by patient and family.   - TTE 8/10 revealed: normal LV function with EF 65%, mild MVR. no PFO or thrombus  - MCOT placed (8/17/2023)  - Making gains in mobility and speech. Continue acute rehab program.  - Transfers and ambulates with RW with supervision  - dc tomorrow home with family    #Aphasia  - ST/ Neuropsych following  - improving  - home care ST    #Azotemia   BUN 30/Cr 1.1 (8/28/2023)  - promote oral hydration   - given IVF (8/17/2023) and BUN improved to 29  - appears to be chronic, stable  - f/u as oupatient    #HTN  - SBP goal per neuro 140-180  - c/w Norvasc 10mg QD  - stable  - f/u PMD    #R lateral malleolus ankle fracture  - WBAT in CAM boot  - conservative non-surgical management  - Pain control tylenol 650mg q6hr PRN  - followup with orthopedics, Dr. Enoc Perez in 2 weeks orthopedics.     #s/p E.coli UTI  - completed antibiotic  - asymptomatic    #Misc  DVT ppx: SQH  Zofran for nausea  GI/bowel ppx: protonix, senna qhs  Diet: DASH/TLC diet  Code status: Full Code  -Skin: No active issues at this time    Precautions / PROPHYLAXIS:  sc Heparin    - Falls, safety    - Ortho: Weight bearing status: WBAT with CAM boot of RLE

## 2023-08-30 NOTE — PROGRESS NOTE ADULT - SUBJECTIVE AND OBJECTIVE BOX
Patient is a 78y old  Female who presents with a chief complaint of Acute rehabilitation of gait disorder secondary to multiple intracranial vascular territory infarcts, likely 2/2 thromboembolic event in setting of intracranial vasculopathy and right ankle fracture of lateral malleolus (18 Aug 2023 11:55)      HPI:  Patient is 79 yo female with hx of AMS in the past for UTI who has not seen a physician for over 20 yrs presenting with AMS that started yesterday, and progressively worsening. As per son, patient has been doing ok, yesterday morning, and around noon time, she started to get confused.  Offers no other complaints.  At baseline pt has no cognitive impairment per family. Significant labs/vitals for BP at 213/94 on presentation, WBC 15, nitrite+ on UA.    Pt evaluated by neurology and ICU and found to have NIHSS of 4 due to 2 questions and aphasia on initial presentation on 8/10/23.    CT Head Non-Contrast   - No definite acute intracranial pathology. Moderate sized chronic left occipital infarct. Multiple age-indeterminate lacunar infarcts along the right coronal  radiata.     - Indeterminate confluent hypodensity in the right frontal subcortical white matter without mass effect. Nonspecific and differential can include focus of gliosis, demyelination,    chronic microvascular changes amongst others.     CT Angio Head w/ IV Cont:    - Focal occlusion in the proximal inferior M2 branch of the left MCA with diminutive enhancement in the distal branches.   - Irregular stenosis involving multiple M2 branches of the right MCA and distal right TUCKER.   - Multifocal stenosis in the bilateral posterior cerebral arteries.    MRI reveals Acute infarcts in the left temporal lobe, left parietal lobe, and left insular cortex. Patchy subacute/acute infarct in the right frontal subcortical region superimposed on small chronic lacunar infarcts. Mild chronic microvascular ischemic changes and scattered chronic infarcts.     Pt was seen by orthopedics s/p fall with ankle pain and found to have R lateral malleolus ankle fracture to be conservatively managed with CAM boot and be WBAT. Pt was initially planned for GERARDO but patient/family refused GERARDO and loop recorder placement. MCOT set up on 8/17/2023.    obtained TTE 8/10:    1. Normal global left ventricular systolic function.   2. LV Ejection Fraction by Martinez's Method with a biplane EF of 65 %.   3. Normal left atrial size.   4. There is no evidence of pericardial effusion.   5. Mild mitral valve regurgitation.    The patient was evaluated by the PM&R team once medically stable. The patient was found to have functional limitation in terms of muscle strength, endurance, physical mobility, and ability to carry out activities of daily living (self care, transfers, and ambulation). The patient was started on a course of bedside therapy, She currently requires CG for bed mobility, min assist for transfers and to ambulate with a RW 30 feet in right CAM Boot WBAT, and min assist for UE dessing, mod assist for LE dressing. The pt is motivated and able to start 3 hours of therapy  daily for 6-7 days a week. the patient was deemed to be a good candidate for admission for acute inpatient rehab. The patient was admitted to acute inpatient rehab on 8/17/23.   (17 Aug 2023 10:19)      TODAY'S SUBJECTIVE & REVIEW OF SYMPTOMS:   Pt seen and examined at bedside.  No overnight events.  No new complaints.  Voiding urine/stool spontaneously.    Tolerating therapies well.  Vitals reviewed.     DC tomorrow.    Pt has MCOT since 8/17/2023.    Current level of functioning  Supervision with bed mobility and transfers; ambulated 150 ft x 2 using RW with supervision.    Review of Systems:   Constitutional:    [ x  ] WNL           [   ] poor appetite   [   ] insomnia   [   ] tired   Cardio:                [x   ] WNL           [   ] CP   [   ] SARMIENTO   [   ] palpitations               Resp:                   [ x  ] WNL           [   ] SOB   [   ] cough   [   ] wheezing   GI:                        [ x  ] WNL           [   ] constipation  [   ] diarrhea   [   ] abdominal pain   [   ] nausea   [   ] emesis                                :                      [   ] WNL           [   ] ZAVALA  [   ] dysuria   [   ] difficulty voiding        [ x ]  urinary incontinence at times   Endo:                   [x   ] WNL          [   ] polyuria   [   ] temperature intolerance                 Skin:                     [x   ] WNL          [   ] pain   [   ] wound   [   ] rash   MSK:                    [    ] WNL          [   ] muscle pain   [   x] joint pain/ stiffness right ankle   [   ] muscle tenderness   [   ] swelling   Neuro:                 [   ] WNL [ x ] per HPI          [   ] HA   [   ] change in vision   [   ] tremor   [   ] weakness   [   ]dysphagia             Psych:                     [ x ] mood/ affect WNL      Physical Exam:   Vital Signs (24 Hrs):  T(C): 36.6 (08-30-23 @ 05:42), Max: 36.8 (08-29-23 @ 14:26)  HR: 67 (08-30-23 @ 05:42) (67 - 79)  BP: 129/59 (08-30-23 @ 05:42) (129/59 - 182/84)  RR: 18 (08-30-23 @ 05:42) (18 - 18)  SpO2: --  Wt(kg): --  Daily     Daily     I&O's Summary    29 Aug 2023 07:01  -  30 Aug 2023 07:00  --------------------------------------------------------  IN: 0 mL / OUT: 450 mL / NET: -450 mL    30 Aug 2023 07:01  -  30 Aug 2023 10:10  --------------------------------------------------------  IN: 0 mL / OUT: 600 mL / NET: -600 mL          General:  [x  ] NAD, Resting Comfortable,   [   ] other:                                HEENT: [ x  ] NC/AT, EOMI, PERRL , Normal Conjunctivae,   [   ] other:  Cardio: [ x  ] RRR, no murmer,   [   ] other:                              Pulm: [ x  ] No Respiratory Distress,  Lungs CTAB,   [   ] other:                       Abdomen: [   ]ND/NT, Soft,   [   ] other:    : [ x  ] NO ZAVALA CATHETER, [   ] ZAVALA CATHETER- no meatal tear, no discharge, [   ] other:                                            MSK: [   ] No joint swelling, Full ROM,   [ x  ] other:  right lateral ankle swelling and tenderness                                       Ext: [ x  ]No C/C/E, No calf tenderness,   [   ]other:    Skin: [x   ]intact,   [   ] other:                                                                   Neurological Examination:  Cognitive: [    ] AAO x 3,   [ x   ]  other:  oriented to self and place, year with choices (impacted by aphasia)                                                            Attention:  [ x   ] intact,   [    ]  other:                            Memory: [    ] intact,    [  x  ]  other:   limited by aphasia  Mood/Affect: [   x ] wnl,    [    ]  other:                                                                             Communication: [    ]Fluent, no dysarthria, following commands:  [  x  ] other: good comprehension, mild to moderate nonfluent expressive impairment - improving  CN II - XII:  [  x  ] intact,  [    ] other:                                                                                        Motor:   RIGHT UE: [x   ] WNL,  [   ] other:  LEFT    UE: [ x  ] WNL,  [   ] other:  RIGHT LE: [x   ] WNL,  [   ] other:   LEFT    LE: [ x  ] WNL,  [   ] other:    Tone: [x    ] wnl,   [    ]  other:  DTRs: [ x  ]symmetric, [   ] other:  Coordination:   [    ] intact,   [  x  ] other:  mild decrease both legs                                                                         Sensory: [   x ] Intact to light touch,   [    ] other: Cognitive:           [   ] WNL           [ x  ]confusion  with recnet UTI, not baseline    MEDICATIONS  (STANDING):  amLODIPine   Tablet 10 milliGRAM(s) Oral daily  aspirin enteric coated 81 milliGRAM(s) Oral daily  atorvastatin 80 milliGRAM(s) Oral at bedtime  chlorhexidine 2% Cloths 1 Application(s) Topical <User Schedule>  clopidogrel Tablet 75 milliGRAM(s) Oral daily  heparin   Injectable 5000 Unit(s) SubCutaneous every 12 hours  pantoprazole    Tablet 40 milliGRAM(s) Oral before breakfast  senna 2 Tablet(s) Oral at bedtime    MEDICATIONS  (PRN):  acetaminophen     Tablet .. 650 milliGRAM(s) Oral every 6 hours PRN Temp greater or equal to 38C (100.4F), Mild Pain (1 - 3)  bisacodyl 10 milliGRAM(s) Oral at bedtime PRN Constipation  ondansetron   Disintegrating Tablet 4 milliGRAM(s) Oral every 8 hours PRN Nausea and/or Vomiting  polyethylene glycol 3350 17 Gram(s) Oral two times a day PRN Constipation        RECENT LABS/IMAGING                        14.5   8.40  )-----------( 266      ( 28 Aug 2023 08:02 )             43.1     08-28    141  |  110  |  30<H>  ----------------------------<  100<H>  4.5   |  20  |  1.1    Ca    9.4      28 Aug 2023 08:02  Mg     2.2     08-28    TPro  6.7  /  Alb  3.9  /  TBili  0.7  /  DBili  x   /  AST  28  /  ALT  37  /  AlkPhos  181<H>  08-28                              14.0   9.73  )-----------( 253      ( 21 Aug 2023 07:06 )             41.4     08-21    142  |  109  |  30<H>  ----------------------------<  93  4.3   |  22  |  1.0    Ca    9.1      21 Aug 2023 07:06  Mg     2.2     08-21    TPro  6.2  /  Alb  3.7  /  TBili  0.7  /  DBili  x   /  AST  32  /  ALT  47<H>  /  AlkPhos  191<H>  08-21     Patient is a 78y old  Female who presents with a chief complaint of Acute rehabilitation of gait disorder secondary to multiple intracranial vascular territory infarcts, likely 2/2 thromboembolic event in setting of intracranial vasculopathy and right ankle fracture of lateral malleolus (18 Aug 2023 11:55)      HPI:  Patient is 79 yo female with hx of AMS in the past for UTI who has not seen a physician for over 20 yrs presenting with AMS that started yesterday, and progressively worsening. As per son, patient has been doing ok, yesterday morning, and around noon time, she started to get confused.  Offers no other complaints.  At baseline pt has no cognitive impairment per family. Significant labs/vitals for BP at 213/94 on presentation, WBC 15, nitrite+ on UA.    Pt evaluated by neurology and ICU and found to have NIHSS of 4 due to 2 questions and aphasia on initial presentation on 8/10/23.    CT Head Non-Contrast   - No definite acute intracranial pathology. Moderate sized chronic left occipital infarct. Multiple age-indeterminate lacunar infarcts along the right coronal  radiata.     - Indeterminate confluent hypodensity in the right frontal subcortical white matter without mass effect. Nonspecific and differential can include focus of gliosis, demyelination,    chronic microvascular changes amongst others.     CT Angio Head w/ IV Cont:    - Focal occlusion in the proximal inferior M2 branch of the left MCA with diminutive enhancement in the distal branches.   - Irregular stenosis involving multiple M2 branches of the right MCA and distal right TUCKER.   - Multifocal stenosis in the bilateral posterior cerebral arteries.    MRI reveals Acute infarcts in the left temporal lobe, left parietal lobe, and left insular cortex. Patchy subacute/acute infarct in the right frontal subcortical region superimposed on small chronic lacunar infarcts. Mild chronic microvascular ischemic changes and scattered chronic infarcts.     Pt was seen by orthopedics s/p fall with ankle pain and found to have R lateral malleolus ankle fracture to be conservatively managed with CAM boot and be WBAT. Pt was initially planned for GERARDO but patient/family refused GERARDO and loop recorder placement. MCOT set up on 8/17/2023.    obtained TTE 8/10:    1. Normal global left ventricular systolic function.   2. LV Ejection Fraction by Martinez's Method with a biplane EF of 65 %.   3. Normal left atrial size.   4. There is no evidence of pericardial effusion.   5. Mild mitral valve regurgitation.    The patient was evaluated by the PM&R team once medically stable. The patient was found to have functional limitation in terms of muscle strength, endurance, physical mobility, and ability to carry out activities of daily living (self care, transfers, and ambulation). The patient was started on a course of bedside therapy, She currently requires CG for bed mobility, min assist for transfers and to ambulate with a RW 30 feet in right CAM Boot WBAT, and min assist for UE dessing, mod assist for LE dressing. The pt is motivated and able to start 3 hours of therapy  daily for 6-7 days a week. the patient was deemed to be a good candidate for admission for acute inpatient rehab. The patient was admitted to acute inpatient rehab on 8/17/23.   (17 Aug 2023 10:19)      TODAY'S SUBJECTIVE & REVIEW OF SYMPTOMS:   Pt seen and examined at bedside.  No overnight events.  No new complaints.  Voiding urine/stool spontaneously.    Tolerating therapies well.  Vitals reviewed.     DC tomorrow.    Pt has MCOT since 8/17/2023.    Current level of functioning  Supervision with bed mobility and transfers; ambulated 150 ft x 2 using RW with supervision.    Review of Systems:   Constitutional:    [ x  ] WNL           [   ] poor appetite   [   ] insomnia   [   ] tired   Cardio:                [x   ] WNL           [   ] CP   [   ] SARMIENTO   [   ] palpitations               Resp:                   [ x  ] WNL           [   ] SOB   [   ] cough   [   ] wheezing   GI:                        [ x  ] WNL           [   ] constipation  [   ] diarrhea   [   ] abdominal pain   [   ] nausea   [   ] emesis                                :                      [   ] WNL           [   ] ZAVALA  [   ] dysuria   [   ] difficulty voiding        [ x ]  urinary incontinence at times   Endo:                   [x   ] WNL          [   ] polyuria   [   ] temperature intolerance                 Skin:                     [x   ] WNL          [   ] pain   [   ] wound   [   ] rash   MSK:                    [    ] WNL          [   ] muscle pain   [   x] joint pain/ stiffness right ankle   [   ] muscle tenderness   [   ] swelling   Neuro:                 [   ] WNL [ x ] per HPI          [   ] HA   [   ] change in vision   [   ] tremor   [   ] weakness   [   ]dysphagia             Psych:                    [ x ] mood/ affect WNL      Physical Exam:   Vital Signs (24 Hrs):  T(C): 36.6 (08-30-23 @ 05:42), Max: 36.8 (08-29-23 @ 14:26)  HR: 67 (08-30-23 @ 05:42) (67 - 79)  BP: 129/59 (08-30-23 @ 05:42) (129/59 - 182/84)  RR: 18 (08-30-23 @ 05:42) (18 - 18)  SpO2: --  Wt(kg): --  Daily     Daily     I&O's Summary    29 Aug 2023 07:01  -  30 Aug 2023 07:00  --------------------------------------------------------  IN: 0 mL / OUT: 450 mL / NET: -450 mL    30 Aug 2023 07:01  -  30 Aug 2023 10:10  --------------------------------------------------------  IN: 0 mL / OUT: 600 mL / NET: -600 mL          General:  [x  ] NAD, Resting Comfortable,   [   ] other:                                HEENT: [ x  ] NC/AT, EOMI, PERRL , Normal Conjunctivae,   [   ] other:  Cardio: [ x  ] RRR, no murmer,   [   ] other:                              Pulm: [ x  ] No Respiratory Distress,  Lungs CTAB,   [   ] other:                       Abdomen: [   ]ND/NT, Soft,   [   ] other:    : [ x  ] NO ZAVALA CATHETER, [   ] ZAVALA CATHETER- no meatal tear, no discharge, [   ] other:                                            MSK: [   ] No joint swelling, Full ROM,   [ x  ] other:  right lateral ankle swelling and tenderness                                       Ext: [ x  ]No C/C/E, No calf tenderness,   [   ]other:    Skin: [x   ]intact,   [   ] other:                                                                   Neurological Examination:  Cognitive: [    ] AAO x 3,   [ x   ]  other:  oriented to self and place, year with choices (impacted by aphasia)                                                            Attention:  [ x   ] intact,   [    ]  other:                            Memory: [    ] intact,    [  x  ]  other:   limited by aphasia  Mood/Affect: [   x ] wnl,    [    ]  other:                                                                             Communication: [    ]Fluent, no dysarthria, following commands:  [  x  ] other: good comprehension, mild to moderate nonfluent expressive impairment - improving  CN II - XII:  [  x  ] intact,  [    ] other:                                                                                        Motor:   RIGHT UE: [x   ] WNL,  [   ] other:  LEFT    UE: [ x  ] WNL,  [   ] other:  RIGHT LE: [x   ] WNL,  [   ] other:   LEFT    LE: [ x  ] WNL,  [   ] other:    Tone: [x    ] wnl,   [    ]  other:  DTRs: [ x  ]symmetric, [   ] other:  Coordination:   [    ] intact,   [  x  ] other:  mild decrease both legs                                                                         Sensory: [   x ] Intact to light touch,   [    ] other: Cognitive:           [   ] WNL           [ x  ]confusion  with recnet UTI, not baseline    MEDICATIONS  (STANDING):  amLODIPine   Tablet 10 milliGRAM(s) Oral daily  aspirin enteric coated 81 milliGRAM(s) Oral daily  atorvastatin 80 milliGRAM(s) Oral at bedtime  chlorhexidine 2% Cloths 1 Application(s) Topical <User Schedule>  clopidogrel Tablet 75 milliGRAM(s) Oral daily  heparin   Injectable 5000 Unit(s) SubCutaneous every 12 hours  pantoprazole    Tablet 40 milliGRAM(s) Oral before breakfast  senna 2 Tablet(s) Oral at bedtime    MEDICATIONS  (PRN):  acetaminophen     Tablet .. 650 milliGRAM(s) Oral every 6 hours PRN Temp greater or equal to 38C (100.4F), Mild Pain (1 - 3)  bisacodyl 10 milliGRAM(s) Oral at bedtime PRN Constipation  ondansetron   Disintegrating Tablet 4 milliGRAM(s) Oral every 8 hours PRN Nausea and/or Vomiting  polyethylene glycol 3350 17 Gram(s) Oral two times a day PRN Constipation        RECENT LABS/IMAGING                        14.5   8.40  )-----------( 266      ( 28 Aug 2023 08:02 )             43.1     08-28    141  |  110  |  30<H>  ----------------------------<  100<H>  4.5   |  20  |  1.1    Ca    9.4      28 Aug 2023 08:02  Mg     2.2     08-28    TPro  6.7  /  Alb  3.9  /  TBili  0.7  /  DBili  x   /  AST  28  /  ALT  37  /  AlkPhos  181<H>  08-28                              14.0   9.73  )-----------( 253      ( 21 Aug 2023 07:06 )             41.4     08-21    142  |  109  |  30<H>  ----------------------------<  93  4.3   |  22  |  1.0    Ca    9.1      21 Aug 2023 07:06  Mg     2.2     08-21    TPro  6.2  /  Alb  3.7  /  TBili  0.7  /  DBili  x   /  AST  32  /  ALT  47<H>  /  AlkPhos  191<H>  08-21

## 2023-08-30 NOTE — CDI QUERY NOTE - NSCDIOTHERTXTBX_GEN_ALL_CORE_HH
CDI CLARIFICATION:  Query  Based on your professional judgment and the clinical indicators below, please clarify if acute encephalopathy can be further specified as:  •	Suspected metabolic encephalopathy associated with SINCERE  and alkaline phosphatase 117 could not be ruled out at the time of discharge.  •	Suspected metabolic encephalopathy associated with SINCERE and alkaline phosphatase 117 was  ruled out at the time of discharge.  •	Other (please specify):  •	Clinically unable to determine acute encephalopathy.      Clinical evidence.  A 78 year old female with no known medical history (does not see a health care provider) BIB son to the emergency room for confusion. As per son patient was noted  during a phone conversation to be confused, and when he got home the confusion was worse. Unsure of last time well. evalauted by neurology and ICU, taken to ICU for further neuro monitoring.   labs showed leukocytosis with + nitrite on UA),  No radiographic evidence of acute cardiopulmonary disease.  CT Head No Cont found to have   - No definite acute intracranial pathology. Moderate sized chronic left occipital infarct. Multiple age-indeterminate lacunar infarcts along the right coronal  radiata.     - Indeterminate confluent hypodensity in the right frontal subcortical white matter without mass effect. Nonspecific and differential can include focus of gliosis, demyelination,    chronic microvascular changes amongst others.   CT Angio Head w/ IV Cont found to have    - Focal occlusion in the proximal inferior M2 branch of the left MCA with diminutive enhancement in the distal branches.    BP uncontrolled on presentation 213/94, no fever, WBC 15, mild SINCERE, alk phos 117, + UA for nitrites.    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

## 2023-08-30 NOTE — PROGRESS NOTE ADULT - ATTENDING COMMENTS
I reviewed the chart and examined the patient with the resident and we discussed the findings and treatment plan.  The patient is tolerating the rehab program well. I agree with the findings and treatment plan above, which I modified as indicated. The patient requires 3 hrs a day of acute inpatient rehab. No new complaints. VSS. Labs reviewed and stable. Neuro stable. Mild dysfluent aphasia. Gait/ balance improving. Ambulates with RW with touch assist. Continue full rehab program.    I read, edited and agree with the Assessment:  #Rehab of gait disorder secondary to multiple intracranial vascular territory infarcts, likely 2/2 thromboembolic event in setting of intracranial vasculopathy, and right ankle fracture of lateral malleolus  #CVA; Acute Left temporal, L parietal, and L insular  - Continue DAPT 81mg ASA QD, Plavix 75mg QD, high dose statin 80mg atorvastatin 80mg qhs  - maintain SBP goals 140-180.   - continue PT/OT/SLP   - GERARDO planned for 8/14 -- refused by patient and family.   - TTE 8/10 revealed: normal LV function with EF 65%, mild MVR. no PFO or thrombus  - MCOT placed (8/17/2023)  - Making gains in mobility and speech. Continue acute rehab program.    #Aphasia  - ST/ Neuropsych following  - improving    #Azotemia   BUN 30/Cr 1.1 (8/28/2023)  - promote oral hydration   - given IVF (8/17/2023) and BUN improved to 29  - appears to be chronic, stable  - recheck in a few days    #HTN  - SBP goal per neuro 140-180  - c/w Norvasc 10mg QD  - 130- 150  - stable    #R lateral malleolus ankle fracture  - WBAT in CAM boot  - conservative non-surgical management  - Pain control tylenol 650mg q6hr PRN  - followup with orthopedics, Dr. Enoc Perez in 2 weeks orthopedics.     #s/p E.coli UTI  - completed antibiotic  - asymptomatic
Patient seen and examined with the resident. We discussed the case. I have directed the care. I edited the note. The patient requires acute rehab with 3 hours of daily therapies at least 5 out of 7 days and close physiatry follow up. She is progressing in therapies.  #Rehab of gait disorder secondary to multiple intracranial vascular territory infarcts, likely 2/2 thromboembolic event in setting of intracranial vasculopathy, and right ankle fracture of lateral malleolus  #CVA; Acute Left temporal, L parietal, and L insular  - Continue DAPT 81mg ASA QD, Plavix 75mg QD, high dose statin 80mg atorvastatin 80mg qhs  - maintain SBP goals 140-180.   - continue PT/OT/SLP   - GERARDO planned for 8/14 -- refused by patient and family.   - TTE 8/10 revealed: normal LV function with EF 65%, mild MVR. no PFO or thrombus  -MCOT placed    #Aphasia  - ST/ Neuropsych eval    #Azotemia   - promote oral hydration   - given IVF overnight and BUN improved to 29  - f/u labs    #HTN  - SBP goal per neuro 140-180  - c/w Norvasc 10mg QD  - systolic running 138- 177 8/18.   - monitor and will add low dose Losartan if continues to be elevated  - cont permissive HTN for now    #R lateral malleolus ankle fracture  - WBAT in CAM boot  - conservative non-surgical management  - Pain control tylenol 650mg q6hr PRN  - followup with orthopedics, Dr. Enoc Perez in 2 weeks orthopedics.     #s/p E.coli UTI  - Pt started on CTX for abx treatment    #Misc  DVT ppx: SQH  Zofran for nausea  GI/bowel ppx: protonix, senna qhs  Diet: DASH/TLC diet  Code status: Full Code  -Skin: No active issues at this time    Precautions / PROPHYLAXIS:      - Falls, safety    - Ortho: Weight bearing status: WABT with CAM boot of RLE
I reviewed the chart and examined the patient with the resident and we discussed the findings and treatment plan.  The patient is tolerating the rehab program well. I agree with the findings and treatment plan above, which I modified as indicated. No new complaint . VSS. Denies pain. Ambulating with RW and CAM boot with supervision. Speech fluency seems to be slowly improving. Antic. d/c home with VN services in am.     I read, edited and agree with the Assessment:  #Rehab of gait disorder secondary to multiple intracranial vascular territory infarcts, likely 2/2 thromboembolic event in setting of intracranial vasculopathy, and right ankle fracture of lateral malleolus  #CVA; Acute Left temporal, L parietal, and L insular  - Continue DAPT 81mg ASA QD, Plavix 75mg QD, high dose statin 80mg atorvastatin 80mg qhs  - maintain SBP goals 140-180.   - continue PT/OT/SLP   - GERARDO planned for 8/14 -- refused by patient and family.   - TTE 8/10 revealed: normal LV function with EF 65%, mild MVR. no PFO or thrombus  - MCOT placed (8/17/2023)  - Making gains in mobility and speech. Continue acute rehab program.  - Transfers and ambulates with RW with supervision  - dc tomorrow home with family    #Aphasia  - ST/ Neuropsych following  - improving  - home care ST    #Azotemia   BUN 30/Cr 1.1 (8/28/2023)  - promote oral hydration   - given IVF (8/17/2023) and BUN improved to 29  - appears to be chronic, stable  - f/u as oupatient    #HTN  - SBP goal per neuro 140-180  - c/w Norvasc 10mg QD  - stable  - f/u PMD    #R lateral malleolus ankle fracture  - WBAT in CAM boot  - conservative non-surgical management  - Pain control tylenol 650mg q6hr PRN  - followup with orthopedics, Dr. Enoc Perez in 2 weeks orthopedics.     #s/p E.coli UTI  - completed antibiotic  - asymptomatic
I reviewed the chart and examined the patient with the resident and we discussed the findings and treatment plan.  The patient is tolerating the rehab program well. I agree with the findings and treatment plan above, which I modified as indicated. The patient requires 3 hrs a day of acute inpatient rehab. No new complaint. VSS. Mobility and speech slowly improving. Neuro exam stable.     I read, edited and agree with the Assessment:  #Rehab of gait disorder secondary to multiple intracranial vascular territory infarcts, likely 2/2 thromboembolic event in setting of intracranial vasculopathy, and right ankle fracture of lateral malleolus  #CVA; Acute Left temporal, L parietal, and L insular  - Continue DAPT 81mg ASA QD, Plavix 75mg QD, high dose statin 80mg atorvastatin 80mg qhs  - maintain SBP goals 140-180.   - continue PT/OT/SLP   - GERARDO planned for 8/14 -- refused by patient and family.   - TTE 8/10 revealed: normal LV function with EF 65%, mild MVR. no PFO or thrombus  - MCOT placed (8/17/2023)  - Making gains in mobility and speech. Continue acute rehab program.    #Aphasia  - ST/ Neuropsych following  - improving    #Azotemia    BUN 30 (8/21/2023)  - promote oral hydration   - given IVF (8/17/2023) and BUN improved to 29  - appears to be chronic, stable  - recheck in a few days    #HTN  - SBP goal per neuro 140-180  - c/w Norvasc 10mg QD  - 130- 150  - stable    #R lateral malleolus ankle fracture  - WBAT in CAM boot  - conservative non-surgical management  - Pain control tylenol 650mg q6hr PRN  - followup with orthopedics, Dr. Enoc Perez in 2 weeks orthopedics.     #s/p E.coli UTI  - completed antibiotic  - asymptomatic
I reviewed the chart and examined the patient with the resident and we discussed the findings and treatment plan.  The patient is tolerating the rehab program well. I agree with the findings and treatment plan above, which I modified as indicated. The patient requires 3 hrs a day of acute inpatient rehab. No new complaints. Alert. VSS. Denies ankle pain in boot. Neuro exam/ aphasia stable. Ambulates with CG with RW. Looking forward to d/c home Thursday.    I read, edited and agree with the Assessment:  #Rehab of gait disorder secondary to multiple intracranial vascular territory infarcts, likely 2/2 thromboembolic event in setting of intracranial vasculopathy, and right ankle fracture of lateral malleolus  #CVA; Acute Left temporal, L parietal, and L insular  - Continue DAPT 81mg ASA QD, Plavix 75mg QD, high dose statin 80mg atorvastatin 80mg qhs  - maintain SBP goals 140-180.   - continue PT/OT/SLP   - GERARDO planned for 8/14 -- refused by patient and family.   - TTE 8/10 revealed: normal LV function with EF 65%, mild MVR. no PFO or thrombus  - MCOT placed (8/17/2023)  - Making gains in mobility and speech. Continue acute rehab program.    #Aphasia  - ST/ Neuropsych following  - improving    #Azotemia   BUN 30/Cr 1.1 (8/28/2023)  - promote oral hydration   - given IVF (8/17/2023) and BUN improved to 29  - appears to be chronic, stable  - recheck in a few days    #HTN  - SBP goal per neuro 140-180  - c/w Norvasc 10mg QD  - 130- 150  - stable    #R lateral malleolus ankle fracture  - WBAT in CAM boot  - conservative non-surgical management  - Pain control tylenol 650mg q6hr PRN  - followup with orthopedics, Dr. Enoc Perez in 2 weeks orthopedics.     #s/p E.coli UTI  - completed antibiotic  - asymptomatic    #Misc  DVT ppx: SQH  Zofran for nausea  GI/bowel ppx: protonix, senna qhs  Diet: DASH/TLC diet  Code status: Full Code  -Skin: No active issues at this time    Precautions / PROPHYLAXIS:  sc Heparin
I reviewed the chart and examined the patient with the resident and we discussed the findings and treatment plan.  The patient is tolerating the rehab program well. I agree with the findings and treatment plan above, which I modified as indicated. The patient requires 3 hrs a day of acute inpatient rehab. No new complaints. Alert. VSS. Language improving. Ambulates with CAM Boot and RW with min assist. Neuro exam stable. Continue rehab program.    I read, edited and agree with the Assessment:  #Rehab of gait disorder secondary to multiple intracranial vascular territory infarcts, likely 2/2 thromboembolic event in setting of intracranial vasculopathy, and right ankle fracture of lateral malleolus  #CVA; Acute Left temporal, L parietal, and L insular  - Continue DAPT 81mg ASA QD, Plavix 75mg QD, high dose statin 80mg atorvastatin 80mg qhs  - maintain SBP goals 140-180.   - continue PT/OT/SLP   - GERARDO planned for 8/14 -- refused by patient and family.   - TTE 8/10 revealed: normal LV function with EF 65%, mild MVR. no PFO or thrombus  - MCOT placed (8/17/2023)    #Aphasia  - ST/ Neuropsych eval  - improving    #Azotemia   - promote oral hydration   - given IVF overnight and BUN improved to 29  - appears to be chronic, stable    #HTN  - SBP goal per neuro 140-180  - c/w Norvasc 10mg QD  - 130- 150  - stable    #R lateral malleolus ankle fracture  - WBAT in CAM boot  - conservative non-surgical management  - Pain control tylenol 650mg q6hr PRN  - followup with orthopedics, Dr. Enoc Perez in 2 weeks orthopedics.     #s/p E.coli UTI  - completed antibiotic  - asymptomatic
PTN WAS SEEN AND EXAM AT BED SIDE WITH REHAB RESIDENT D/W RESIDENT PTN CARE   I HAVE DIRECT THE PTN CARE .
Patient seen and examined with the resident. We discussed the case. I have directed the care. I edited the note. The patient requires acute rehab with 3 hours of daily therapies at least 5 out of 7 days and close physiatry follow up. She is progressing in therapies.  #Rehab of gait disorder secondary to multiple intracranial vascular territory infarcts, likely 2/2 thromboembolic event in setting of intracranial vasculopathy, and right ankle fracture of lateral malleolus  #CVA; Acute Left temporal, L parietal, and L insular  - Continue DAPT 81mg ASA QD, Plavix 75mg QD, high dose statin 80mg atorvastatin 80mg qhs  - maintain SBP goals 140-180.   - continue PT/OT/SLP   - GERARDO planned for 8/14 -- refused by patient and family.   - TTE 8/10 revealed: normal LV function with EF 65%, mild MVR. no PFO or thrombus  - MCOT placed (8/17/2023)  - Making gains in mobility and speech. Continue acute rehab program.    #Aphasia  - ST/ Neuropsych following  - improving    #Azotemia    BUN 30 (8/21/2023)  - promote oral hydration   - given IVF (8/17/2023) and BUN improved to 29  - appears to be chronic, stable  - recheck in a few days    #HTN  - SBP goal per neuro 140-180  - c/w Norvasc 10mg QD  - 130- 150  - stable    #R lateral malleolus ankle fracture  - WBAT in CAM boot  - conservative non-surgical management  - Pain control tylenol 650mg q6hr PRN  - followup with orthopedics, Dr. Enoc Perez in 2 weeks orthopedics.     #s/p E.coli UTI  - completed antibiotic  - asymptomatic    #Misc  DVT ppx: SQH  Zofran for nausea  GI/bowel ppx: protonix, senna qhs  Diet: DASH/TLC diet  Code status: Full Code  -Skin: No active issues at this time
Rehab of CVA. Pt seen and examined with the resident. The treatment plan discussed. Agree with the above.
I reviewed the chart and examined the patient with the resident and we discussed the findings and treatment plan.  The patient is tolerating the rehab program well. I agree with the findings and treatment plan above, which I modified as indicated. The patient requires 3 hrs a day of acute inpatient rehab. No new complaints. VSS. Denies ankle pain. Neuro exam stable. Ambultes with RW with CG. Continuer acute PT, OT, ST.    I read, edited and agree with the Assessment:  #Rehab of gait disorder secondary to multiple intracranial vascular territory infarcts, likely 2/2 thromboembolic event in setting of intracranial vasculopathy, and right ankle fracture of lateral malleolus  #CVA; Acute Left temporal, L parietal, and L insular  - Continue DAPT 81mg ASA QD, Plavix 75mg QD, high dose statin 80mg atorvastatin 80mg qhs  - maintain SBP goals 140-180.   - continue PT/OT/SLP   - GERARDO planned for 8/14 -- refused by patient and family.   - TTE 8/10 revealed: normal LV function with EF 65%, mild MVR. no PFO or thrombus  - MCOT placed (8/17/2023)    #Aphasia  - ST/ Neuropsych following  - improving    #Azotemia    BUN 30 (8/21/2023)  - promote oral hydration   - given IVF (8/17/2023) and BUN improved to 29  - appears to be chronic, stable    #HTN  - SBP goal per neuro 140-180  - c/w Norvasc 10mg QD  - 130- 150  - stable    #R lateral malleolus ankle fracture  - WBAT in CAM boot  - conservative non-surgical management  - Pain control tylenol 650mg q6hr PRN  - followup with orthopedics, Dr. Enoc Perez in 2 weeks orthopedics.     #s/p E.coli UTI  - completed antibiotic  - asymptomatic

## 2023-08-31 VITALS
DIASTOLIC BLOOD PRESSURE: 68 MMHG | RESPIRATION RATE: 18 BRPM | SYSTOLIC BLOOD PRESSURE: 134 MMHG | TEMPERATURE: 98 F | HEART RATE: 62 BPM

## 2023-08-31 RX ORDER — AMLODIPINE BESYLATE 2.5 MG/1
1 TABLET ORAL
Qty: 30 | Refills: 0
Start: 2023-08-31 | End: 2023-09-29

## 2023-08-31 RX ADMIN — PANTOPRAZOLE SODIUM 40 MILLIGRAM(S): 20 TABLET, DELAYED RELEASE ORAL at 06:03

## 2023-08-31 RX ADMIN — Medication 81 MILLIGRAM(S): at 11:23

## 2023-08-31 RX ADMIN — CHLORHEXIDINE GLUCONATE 1 APPLICATION(S): 213 SOLUTION TOPICAL at 06:02

## 2023-08-31 RX ADMIN — HEPARIN SODIUM 5000 UNIT(S): 5000 INJECTION INTRAVENOUS; SUBCUTANEOUS at 06:02

## 2023-08-31 RX ADMIN — CLOPIDOGREL BISULFATE 75 MILLIGRAM(S): 75 TABLET, FILM COATED ORAL at 11:23

## 2023-08-31 NOTE — PROGRESS NOTE ADULT - SUBJECTIVE AND OBJECTIVE BOX
Patient is a 78y old  Female who presents with a chief complaint of Acute rehabilitation of gait disorder secondary to multiple intracranial vascular territory infarcts, likely 2/2 thromboembolic event in setting of intracranial vasculopathy and right ankle fracture of lateral malleolus (18 Aug 2023 11:55)      HPI:  Patient is 77 yo female with hx of AMS in the past for UTI who has not seen a physician for over 20 yrs presenting with AMS that started yesterday, and progressively worsening. As per son, patient has been doing ok, yesterday morning, and around noon time, she started to get confused.  Offers no other complaints.  At baseline pt has no cognitive impairment per family. Significant labs/vitals for BP at 213/94 on presentation, WBC 15, nitrite+ on UA.    Pt evaluated by neurology and ICU and found to have NIHSS of 4 due to 2 questions and aphasia on initial presentation on 8/10/23.    CT Head Non-Contrast   - No definite acute intracranial pathology. Moderate sized chronic left occipital infarct. Multiple age-indeterminate lacunar infarcts along the right coronal  radiata.     - Indeterminate confluent hypodensity in the right frontal subcortical white matter without mass effect. Nonspecific and differential can include focus of gliosis, demyelination,    chronic microvascular changes amongst others.     CT Angio Head w/ IV Cont:    - Focal occlusion in the proximal inferior M2 branch of the left MCA with diminutive enhancement in the distal branches.   - Irregular stenosis involving multiple M2 branches of the right MCA and distal right TUCKER.   - Multifocal stenosis in the bilateral posterior cerebral arteries.    MRI reveals Acute infarcts in the left temporal lobe, left parietal lobe, and left insular cortex. Patchy subacute/acute infarct in the right frontal subcortical region superimposed on small chronic lacunar infarcts. Mild chronic microvascular ischemic changes and scattered chronic infarcts.     Pt was seen by orthopedics s/p fall with ankle pain and found to have R lateral malleolus ankle fracture to be conservatively managed with CAM boot and be WBAT. Pt was initially planned for GERARDO but patient/family refused GERARDO and loop recorder placement. MCOT set up on 8/17/2023.    obtained TTE 8/10:    1. Normal global left ventricular systolic function.   2. LV Ejection Fraction by Martinez's Method with a biplane EF of 65 %.   3. Normal left atrial size.   4. There is no evidence of pericardial effusion.   5. Mild mitral valve regurgitation.    The patient was evaluated by the PM&R team once medically stable. The patient was found to have functional limitation in terms of muscle strength, endurance, physical mobility, and ability to carry out activities of daily living (self care, transfers, and ambulation). The patient was started on a course of bedside therapy, She currently requires CG for bed mobility, min assist for transfers and to ambulate with a RW 30 feet in right CAM Boot WBAT, and min assist for UE dessing, mod assist for LE dressing. The pt is motivated and able to start 3 hours of therapy  daily for 6-7 days a week. the patient was deemed to be a good candidate for admission for acute inpatient rehab. The patient was admitted to acute inpatient rehab on 8/17/23.   (17 Aug 2023 10:19)      TODAY'S SUBJECTIVE & REVIEW OF SYMPTOMS:   Pt seen and examined at bedside.  No overnight events.  No new complaints. Denies any ankle pain.  Voiding urine/stool spontaneously.    Tolerating therapies well.  Vitals reviewed.   Pt has MCOT since 8/17/2023.    Current level of functioning  Supervision with bed mobility and transfers; ambulated 150 ft x 2 using RW with supervision.    Review of Systems:   Constitutional:    [ x  ] WNL           [   ] poor appetite   [   ] insomnia   [   ] tired   Cardio:                [x   ] WNL           [   ] CP   [   ] SARMIENTO   [   ] palpitations               Resp:                   [ x  ] WNL           [   ] SOB   [   ] cough   [   ] wheezing   GI:                        [ x  ] WNL           [   ] constipation  [   ] diarrhea   [   ] abdominal pain   [   ] nausea   [   ] emesis                                :                      [   ] WNL           [   ] ZAVALA  [   ] dysuria   [   ] difficulty voiding        [ x ]  urinary incontinence at times   Endo:                   [x   ] WNL          [   ] polyuria   [   ] temperature intolerance                 Skin:                     [x   ] WNL          [   ] pain   [   ] wound   [   ] rash   MSK:                    [    ] WNL          [   ] muscle pain   [   x] joint pain/ stiffness right ankle   [   ] muscle tenderness   [   ] swelling   Neuro:                 [   ] WNL [ x ] per HPI          [   ] HA   [   ] change in vision   [   ] tremor   [   ] weakness   [   ]dysphagia             Psych:                    [ x ] mood/ affect WNL      Physical Exam:     Vital Signs Last 24 Hrs  T(C): 36.8 (31 Aug 2023 05:39), Max: 37.1 (30 Aug 2023 20:05)  T(F): 98.2 (31 Aug 2023 05:39), Max: 98.7 (30 Aug 2023 20:05)  HR: 68 (31 Aug 2023 05:39) (64 - 94)  BP: 112/57 (31 Aug 2023 05:39) (112/57 - 151/65)  BP(mean): 71 (30 Aug 2023 20:05) (71 - 71)  RR: 18 (31 Aug 2023 05:39) (18 - 19)  SpO2: --      General:  [x  ] NAD, Resting Comfortable,   [   ] other:                                HEENT: [ x  ] NC/AT, EOMI, PERRL , Normal Conjunctivae,   [   ] other:  Cardio: [ x  ] RRR, no murmer,   [   ] other:                              Pulm: [ x  ] No Respiratory Distress,  Lungs CTAB,   [   ] other:                       Abdomen: [   ]ND/NT, Soft,   [   ] other:    : [ x  ] NO ZAVALA CATHETER, [   ] ZAVALA CATHETER- no meatal tear, no discharge, [   ] other:                                            MSK: [   ] No joint swelling, Full ROM,   [ x  ] other:  right lateral ankle swelling and tenderness                                       Ext: [ x  ]No C/C/E, No calf tenderness,   [   ]other:    Skin: [x   ]intact,   [   ] other:                                                                   Neurological Examination:  Cognitive: [    ] AAO x 3,   [ x   ]  other:  oriented to self and place, year with choices (impacted by aphasia)                                                            Attention:  [ x   ] intact,   [    ]  other:                            Memory: [    ] intact,    [  x  ]  other:   limited by aphasia  Mood/Affect: [   x ] wnl,    [    ]  other:                                                                             Communication: [    ]Fluent, no dysarthria, following commands:  [  x  ] other: good comprehension, mild to moderate nonfluent expressive impairment - improving  CN II - XII:  [  x  ] intact,  [    ] other:                                                                                        Motor:   RIGHT UE: [x   ] WNL,  [   ] other:  LEFT    UE: [ x  ] WNL,  [   ] other:  RIGHT LE: [x   ] WNL,  [   ] other:   LEFT    LE: [ x  ] WNL,  [   ] other:    Tone: [x    ] wnl,   [    ]  other:  DTRs: [ x  ]symmetric, [   ] other:  Coordination:   [    ] intact,   [  x  ] other:  mild decrease both legs                                                                         Sensory: [   x ] Intact to light touch,   [    ] other: Cognitive:           [   ] WNL           [ x  ]confusion  with recnet UTI, not baseline    MEDICATIONS  (STANDING):  amLODIPine   Tablet 10 milliGRAM(s) Oral daily  aspirin enteric coated 81 milliGRAM(s) Oral daily  atorvastatin 80 milliGRAM(s) Oral at bedtime  chlorhexidine 2% Cloths 1 Application(s) Topical <User Schedule>  clopidogrel Tablet 75 milliGRAM(s) Oral daily  heparin   Injectable 5000 Unit(s) SubCutaneous every 12 hours  pantoprazole    Tablet 40 milliGRAM(s) Oral before breakfast  senna 2 Tablet(s) Oral at bedtime    MEDICATIONS  (PRN):  acetaminophen     Tablet .. 650 milliGRAM(s) Oral every 6 hours PRN Temp greater or equal to 38C (100.4F), Mild Pain (1 - 3)  bisacodyl 10 milliGRAM(s) Oral at bedtime PRN Constipation  ondansetron   Disintegrating Tablet 4 milliGRAM(s) Oral every 8 hours PRN Nausea and/or Vomiting  polyethylene glycol 3350 17 Gram(s) Oral two times a day PRN Constipation    RECENT LABS/IMAGING                        14.5   8.40  )-----------( 266      ( 28 Aug 2023 08:02 )             43.1     08-28    141  |  110  |  30<H>  ----------------------------<  100<H>  4.5   |  20  |  1.1    Ca    9.4      28 Aug 2023 08:02  Mg     2.2     08-28    TPro  6.7  /  Alb  3.9  /  TBili  0.7  /  DBili  x   /  AST  28  /  ALT  37  /  AlkPhos  181<H>  08-28

## 2023-08-31 NOTE — PROGRESS NOTE ADULT - PROVIDER SPECIALTY LIST ADULT
Neuropsychology
Physiatry
Neuropsychology
Neuropsychology
Physiatry
Rehab Medicine
Rehab Medicine
Physiatry
Neuropsychology
Physiatry
Neuropsychology

## 2023-08-31 NOTE — PROGRESS NOTE ADULT - TIME BILLING
Patient family contact via telephonic session
Patient seen at bedside
Patient seen at bedside for testing, scoring and interpretation of test results

## 2023-08-31 NOTE — PROGRESS NOTE ADULT - ASSESSMENT
ASSESSMENT/PLAN  78 year old female with no known medical history (does not see a health care provider for past 20 years) brought in by son to the emergency room for confusion. As per son, patient was noted  during a phone conversation to be confused, and when he got home the confusion was worse. Unsure of last time of known welll. evaluated by neurology and ICU. Pt presented with aphasia. taken to ICU for further neuro monitoring.  labs showed leukocytosis with + nitrite on UA), s/p fall pt with R ankle fracture seen by ortho and managed conservatively with CAM boot.  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       #Rehab of gait disorder secondary to multiple intracranial vascular territory infarcts, likely 2/2 thromboembolic event in setting of intracranial vasculopathy, and right ankle fracture of lateral malleolus  #CVA; Acute Left temporal, L parietal, and L insular  - Continue DAPT 81mg ASA QD, Plavix 75mg QD, high dose statin 80mg atorvastatin 80mg qhs  - maintain SBP goals 140-180.   - continue PT/OT/SLP   - GERARDO planned for 8/14 -- refused by patient and family.   - TTE 8/10 revealed: normal LV function with EF 65%, mild MVR. no PFO or thrombus  - MCOT placed (8/17/2023)  - Making gains in mobility and speech. Continue acute rehab program.  - Transfers and ambulates with RW with supervision  - d/c home with family today with VN PT, OT and ST.  - Continue high dose statin and DAPT and f/u with neuro clinic in 2-3 weeks.     #Aphasia  - ST/ Neuropsych following  - improving  - home care ST    #Azotemia   BUN 30/Cr 1.1 (8/28/2023)  - promote oral hydration   - given IVF (8/17/2023) and BUN improved to 29  - appears to be chronic, stable  - f/u as outpatient    #HTN  - SBP goal per neuro 140-180  - c/w Norvasc 10mg QD  - stable  - f/u PMD    #R lateral malleolus ankle fracture  - WBAT in CAM boot  - conservative non-surgical management  - Pain control tylenol 650mg q6hr PRN (denies pain)  - followup with orthopedics, Dr. Enoc Perez in 2 weeks orthopedics.     #s/p E.coli UTI  - completed antibiotic  - asymptomatic    - Diet: DASH/TLC diet    - Ortho: Weight bearing status: WBAT with CAM boot of RLE    Activities as tolerated - in CAM boot for st. bearing and to use RW.  F/U with PMD 2-3 weeks, Ortho and neruo 2-3 weeks.  Physiatry f/u prn   For further summary of hospital course and d/c med rec and patient instructions, see Provider D/C Note, reviewed and signed by me.

## 2023-08-31 NOTE — PROGRESS NOTE ADULT - ASSESSMENT
Neuropsychology Follow up  Treatment Session Focused on Feedback  Patient Contact    Patient was seen on 8/31/2023 to share feedback from neurocognitive testing, which was indicative of mild weaknesses in aspects of memory, executive functioning, and visual reasoning. When presented with verbal learning tasks, the patient benefited from repetition and demonstrated a gradual learning curve. However, she struggled with consolidation and retrieval of verbal and visual information. Results of the patient’s confrontational naming and sentence repetition skills were relatively intact.    Although it is likely that the patient will continue to make some gains over time, current cognitive impairments are consistent with Mild Neurocognitive Disorder, and she would benefit from assistance in daily activities. Etiology of cognitive weakness is multiple intracranial vascular infarcts. Patient will likely benefit from cognitive rehabilitation to assist in developing memory aides, and to bolster executive function. In addition, the patient would likely benefit from techniques to maximize memory skills. To assist with encoding, consolidation, and retrieval, the patient would benefit from the following recommendations (with support): 1) the patient should minimize all distractions in the immediate environment, 2) the patient should focus on one task at a time, 3) the patient should begin using a memory notebook, daily planner, and external aides (if lists or notes are made, they should be kept in the same spot), 4) the patient should be provided with frequent verbal cues and reminders, and 5) use of alarm clocks should be utilized to remind the patient of important tasks to complete. The patient was also provided with online brain stimulating games, which can aid areas of memory functions and brain health and quality of life aging resources. The patient understood and agreed with the recommendations.    Goals: No further goals at this time  Plan: Discharge from neuropsychological services; reconsult as needed    Brain Injury Protocol No  Cognitive Behavioral Guidelines: No

## 2023-09-06 DIAGNOSIS — R11.0 NAUSEA: ICD-10-CM

## 2023-09-06 DIAGNOSIS — I10 ESSENTIAL (PRIMARY) HYPERTENSION: ICD-10-CM

## 2023-09-06 DIAGNOSIS — Z79.82 LONG TERM (CURRENT) USE OF ASPIRIN: ICD-10-CM

## 2023-09-06 DIAGNOSIS — Z79.02 LONG TERM (CURRENT) USE OF ANTITHROMBOTICS/ANTIPLATELETS: ICD-10-CM

## 2023-09-06 DIAGNOSIS — R26.89 OTHER ABNORMALITIES OF GAIT AND MOBILITY: ICD-10-CM

## 2023-09-06 DIAGNOSIS — I69.320 APHASIA FOLLOWING CEREBRAL INFARCTION: ICD-10-CM
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
## 2023-09-06 DIAGNOSIS — S82.61XD DISPLACED FRACTURE OF LATERAL MALLEOLUS OF RIGHT FIBULA, SUBSEQUENT ENCOUNTER FOR CLOSED FRACTURE WITH ROUTINE HEALING: ICD-10-CM

## 2023-09-06 DIAGNOSIS — R79.89 OTHER SPECIFIED ABNORMAL FINDINGS OF BLOOD CHEMISTRY: ICD-10-CM

## 2023-09-06 DIAGNOSIS — Z87.440 PERSONAL HISTORY OF URINARY (TRACT) INFECTIONS: ICD-10-CM

## 2023-09-06 DIAGNOSIS — R32 UNSPECIFIED URINARY INCONTINENCE: ICD-10-CM

## 2023-09-06 DIAGNOSIS — I34.0 NONRHEUMATIC MITRAL (VALVE) INSUFFICIENCY: ICD-10-CM

## 2023-09-06 DIAGNOSIS — I69.398 OTHER SEQUELAE OF CEREBRAL INFARCTION: ICD-10-CM

## 2023-09-11 ENCOUNTER — TRANSCRIPTION ENCOUNTER (OUTPATIENT)
Age: 78
End: 2023-09-11

## 2023-09-11 ENCOUNTER — APPOINTMENT (OUTPATIENT)
Dept: ORTHOPEDIC SURGERY | Facility: CLINIC | Age: 78
End: 2023-09-11
Payer: MEDICARE

## 2023-09-11 VITALS — WEIGHT: 175 LBS | HEIGHT: 61 IN | BODY MASS INDEX: 33.04 KG/M2

## 2023-09-11 PROCEDURE — 73610 X-RAY EXAM OF ANKLE: CPT | Mod: RT

## 2023-09-11 PROCEDURE — 99203 OFFICE O/P NEW LOW 30 MIN: CPT | Mod: 57

## 2023-09-11 PROCEDURE — L4350: CPT | Mod: RT,KX

## 2023-09-11 PROCEDURE — 27786 TREATMENT OF ANKLE FRACTURE: CPT | Mod: RT

## 2023-09-20 NOTE — CHART NOTE - NSCHARTNOTESELECT_GEN_ALL_CORE
Downgrade/Transfer Note
Transfer/Event Note
Event Note
Event Note
GERARDO/Event Note
MCOT
dc
neurology

## 2023-09-20 NOTE — CHART NOTE - NSCHARTNOTEFT_GEN_A_CORE
Suspected metabolic encephalopathy associated with SINCERE  and alkaline phosphatase 117 could not be ruled out at the time of discharge

## 2023-09-21 ENCOUNTER — OUTPATIENT (OUTPATIENT)
Dept: OUTPATIENT SERVICES | Facility: HOSPITAL | Age: 78
LOS: 1 days | End: 2023-09-21
Payer: MEDICARE

## 2023-09-21 ENCOUNTER — APPOINTMENT (OUTPATIENT)
Dept: GERIATRICS | Facility: CLINIC | Age: 78
End: 2023-09-21
Payer: MEDICARE

## 2023-09-21 VITALS
TEMPERATURE: 97.9 F | BODY MASS INDEX: 35.3 KG/M2 | HEIGHT: 61 IN | OXYGEN SATURATION: 98 % | DIASTOLIC BLOOD PRESSURE: 74 MMHG | WEIGHT: 187 LBS | SYSTOLIC BLOOD PRESSURE: 114 MMHG | HEART RATE: 87 BPM

## 2023-09-21 DIAGNOSIS — Z00.00 ENCOUNTER FOR GENERAL ADULT MEDICAL EXAMINATION W/OUT ABNORMAL FINDINGS: ICD-10-CM

## 2023-09-21 DIAGNOSIS — I10 ESSENTIAL (PRIMARY) HYPERTENSION: ICD-10-CM

## 2023-09-21 DIAGNOSIS — R41.82 ALTERED MENTAL STATUS, UNSPECIFIED: ICD-10-CM

## 2023-09-21 PROCEDURE — 99204 OFFICE O/P NEW MOD 45 MIN: CPT | Mod: GC

## 2023-09-21 PROCEDURE — 99204 OFFICE O/P NEW MOD 45 MIN: CPT

## 2023-09-21 RX ORDER — PANTOPRAZOLE SODIUM 40 MG/1
GRANULE, DELAYED RELEASE ORAL
Refills: 0 | Status: ACTIVE | COMMUNITY

## 2023-09-21 RX ORDER — AMLODIPINE BESYLATE 5 MG/1
TABLET ORAL
Refills: 0 | Status: ACTIVE | COMMUNITY

## 2023-09-22 ENCOUNTER — APPOINTMENT (OUTPATIENT)
Dept: CARDIOLOGY | Facility: CLINIC | Age: 78
End: 2023-09-22

## 2023-09-25 ENCOUNTER — APPOINTMENT (OUTPATIENT)
Dept: NEUROPSYCHOLOGY | Facility: CLINIC | Age: 78
End: 2023-09-25

## 2023-09-25 ENCOUNTER — NON-APPOINTMENT (OUTPATIENT)
Age: 78
End: 2023-09-25

## 2023-09-25 ENCOUNTER — OUTPATIENT (OUTPATIENT)
Dept: OUTPATIENT SERVICES | Facility: HOSPITAL | Age: 78
LOS: 1 days | End: 2023-09-25
Payer: MEDICARE

## 2023-09-25 DIAGNOSIS — G31.84 MILD COGNITIVE IMPAIRMENT OF UNCERTAIN OR UNKNOWN ETIOLOGY: ICD-10-CM

## 2023-09-25 PROCEDURE — 96116 NUBHVL XM PHYS/QHP 1ST HR: CPT

## 2023-09-25 PROCEDURE — 96121 NUBHVL XM PHY/QHP EA ADDL HR: CPT

## 2023-09-26 ENCOUNTER — APPOINTMENT (OUTPATIENT)
Dept: NEUROLOGY | Facility: CLINIC | Age: 78
End: 2023-09-26
Payer: MEDICARE

## 2023-09-26 DIAGNOSIS — Z86.73 PERSONAL HISTORY OF TRANSIENT ISCHEMIC ATTACK (TIA), AND CEREBRAL INFARCTION WITHOUT RESIDUAL DEFICITS: ICD-10-CM

## 2023-09-26 DIAGNOSIS — G31.84 MILD COGNITIVE IMPAIRMENT OF UNCERTAIN OR UNKNOWN ETIOLOGY: ICD-10-CM

## 2023-09-26 DIAGNOSIS — I10 ESSENTIAL (PRIMARY) HYPERTENSION: ICD-10-CM

## 2023-09-26 DIAGNOSIS — R41.82 ALTERED MENTAL STATUS, UNSPECIFIED: ICD-10-CM

## 2023-09-26 DIAGNOSIS — Z00.00 ENCOUNTER FOR GENERAL ADULT MEDICAL EXAMINATION WITHOUT ABNORMAL FINDINGS: ICD-10-CM

## 2023-09-26 PROCEDURE — 99205 OFFICE O/P NEW HI 60 MIN: CPT | Mod: 95

## 2023-10-02 ENCOUNTER — OUTPATIENT (OUTPATIENT)
Dept: OUTPATIENT SERVICES | Facility: HOSPITAL | Age: 78
LOS: 1 days | End: 2023-10-02
Payer: MEDICARE

## 2023-10-02 ENCOUNTER — APPOINTMENT (OUTPATIENT)
Dept: NEUROPSYCHOLOGY | Facility: CLINIC | Age: 78
End: 2023-10-02

## 2023-10-02 DIAGNOSIS — G31.84 MILD COGNITIVE IMPAIRMENT OF UNCERTAIN OR UNKNOWN ETIOLOGY: ICD-10-CM

## 2023-10-02 PROCEDURE — 96132 NRPSYC TST EVAL PHYS/QHP 1ST: CPT

## 2023-10-02 PROCEDURE — 96133 NRPSYC TST EVAL PHYS/QHP EA: CPT

## 2023-10-02 PROCEDURE — 96139 PSYCL/NRPSYC TST TECH EA: CPT

## 2023-10-02 PROCEDURE — 96138 PSYCL/NRPSYC TECH 1ST: CPT

## 2023-10-02 NOTE — PATIENT PROFILE ADULT - NUMBER OF YRS
[address] [facility] coding opportunities       Chart reviewed, no opportunity found: CHART REVIEWED, NO OPPORTUNITY FOUND        Patients Insurance     Medicare Insurance: Medicare 5

## 2023-10-03 DIAGNOSIS — G31.84 MILD COGNITIVE IMPAIRMENT OF UNCERTAIN OR UNKNOWN ETIOLOGY: ICD-10-CM

## 2023-10-09 ENCOUNTER — APPOINTMENT (OUTPATIENT)
Dept: NEUROPSYCHOLOGY | Facility: CLINIC | Age: 78
End: 2023-10-09

## 2023-10-16 ENCOUNTER — APPOINTMENT (OUTPATIENT)
Dept: NEUROPSYCHOLOGY | Facility: CLINIC | Age: 78
End: 2023-10-16

## 2023-10-20 ENCOUNTER — APPOINTMENT (OUTPATIENT)
Dept: ORTHOPEDIC SURGERY | Facility: CLINIC | Age: 78
End: 2023-10-20
Payer: MEDICARE

## 2023-10-20 DIAGNOSIS — S82.64XA NONDISPLACED FRACTURE OF LATERAL MALLEOLUS OF RIGHT FIBULA, INITIAL ENCOUNTER FOR CLOSED FRACTURE: ICD-10-CM

## 2023-10-20 PROCEDURE — 99024 POSTOP FOLLOW-UP VISIT: CPT

## 2023-10-20 PROCEDURE — 73610 X-RAY EXAM OF ANKLE: CPT | Mod: RT

## 2023-10-23 ENCOUNTER — APPOINTMENT (OUTPATIENT)
Dept: NEUROPSYCHOLOGY | Facility: CLINIC | Age: 78
End: 2023-10-23

## 2023-10-23 ENCOUNTER — OUTPATIENT (OUTPATIENT)
Dept: OUTPATIENT SERVICES | Facility: HOSPITAL | Age: 78
LOS: 1 days | End: 2023-10-23
Payer: MEDICARE

## 2023-10-23 DIAGNOSIS — G31.84 MILD COGNITIVE IMPAIRMENT OF UNCERTAIN OR UNKNOWN ETIOLOGY: ICD-10-CM

## 2023-10-23 PROCEDURE — 96132 NRPSYC TST EVAL PHYS/QHP 1ST: CPT

## 2023-10-23 PROCEDURE — 96133 NRPSYC TST EVAL PHYS/QHP EA: CPT

## 2023-10-23 PROCEDURE — 96139 PSYCL/NRPSYC TST TECH EA: CPT

## 2023-10-23 PROCEDURE — 96138 PSYCL/NRPSYC TECH 1ST: CPT

## 2023-10-24 DIAGNOSIS — G31.84 MILD COGNITIVE IMPAIRMENT OF UNCERTAIN OR UNKNOWN ETIOLOGY: ICD-10-CM

## 2023-10-26 RX ORDER — PANTOPRAZOLE 40 MG/1
40 TABLET, DELAYED RELEASE ORAL DAILY
Qty: 30 | Refills: 2 | Status: ACTIVE | COMMUNITY
Start: 2023-09-21 | End: 1900-01-01

## 2023-10-26 RX ORDER — ASPIRIN 81 MG/1
81 TABLET, CHEWABLE ORAL DAILY
Qty: 30 | Refills: 2 | Status: ACTIVE | COMMUNITY
Start: 2023-09-21 | End: 1900-01-01

## 2023-11-27 ENCOUNTER — APPOINTMENT (OUTPATIENT)
Dept: ORTHOPEDIC SURGERY | Facility: CLINIC | Age: 78
End: 2023-11-27

## 2023-12-04 ENCOUNTER — APPOINTMENT (OUTPATIENT)
Dept: NEUROPSYCHOLOGY | Facility: CLINIC | Age: 78
End: 2023-12-04

## 2023-12-04 ENCOUNTER — OUTPATIENT (OUTPATIENT)
Dept: OUTPATIENT SERVICES | Facility: HOSPITAL | Age: 78
LOS: 1 days | End: 2023-12-04
Payer: MEDICARE

## 2023-12-04 DIAGNOSIS — G31.84 MILD COGNITIVE IMPAIRMENT OF UNCERTAIN OR UNKNOWN ETIOLOGY: ICD-10-CM

## 2023-12-04 PROCEDURE — 96133 NRPSYC TST EVAL PHYS/QHP EA: CPT | Mod: 95

## 2023-12-04 PROCEDURE — 96132 NRPSYC TST EVAL PHYS/QHP 1ST: CPT | Mod: 95

## 2023-12-05 DIAGNOSIS — G31.84 MILD COGNITIVE IMPAIRMENT OF UNCERTAIN OR UNKNOWN ETIOLOGY: ICD-10-CM

## 2023-12-11 ENCOUNTER — APPOINTMENT (OUTPATIENT)
Dept: NEUROLOGY | Facility: CLINIC | Age: 78
End: 2023-12-11
Payer: MEDICARE

## 2023-12-11 VITALS
BODY MASS INDEX: 33.04 KG/M2 | HEIGHT: 61 IN | HEART RATE: 80 BPM | SYSTOLIC BLOOD PRESSURE: 157 MMHG | DIASTOLIC BLOOD PRESSURE: 85 MMHG | WEIGHT: 175 LBS | OXYGEN SATURATION: 98 % | TEMPERATURE: 98.6 F

## 2023-12-11 DIAGNOSIS — Z86.73 PERSONAL HISTORY OF TRANSIENT ISCHEMIC ATTACK (TIA), AND CEREBRAL INFARCTION W/OUT RESIDUAL DEFICITS: ICD-10-CM

## 2023-12-11 DIAGNOSIS — I67.9 CEREBROVASCULAR DISEASE, UNSPECIFIED: ICD-10-CM

## 2023-12-11 PROCEDURE — 99215 OFFICE O/P EST HI 40 MIN: CPT

## 2023-12-11 RX ORDER — CLOPIDOGREL BISULFATE 300 MG/1
TABLET, FILM COATED ORAL
Refills: 0 | Status: DISCONTINUED | COMMUNITY
End: 2023-12-11

## 2023-12-14 RX ORDER — AMLODIPINE BESYLATE 10 MG/1
10 TABLET ORAL DAILY
Qty: 90 | Refills: 2 | Status: ACTIVE | COMMUNITY
Start: 2023-09-21 | End: 1900-01-01

## 2023-12-15 ENCOUNTER — NON-APPOINTMENT (OUTPATIENT)
Age: 78
End: 2023-12-15

## 2024-01-11 ENCOUNTER — APPOINTMENT (OUTPATIENT)
Dept: GERIATRICS | Facility: CLINIC | Age: 79
End: 2024-01-11

## 2024-01-16 ENCOUNTER — RX RENEWAL (OUTPATIENT)
Age: 79
End: 2024-01-16

## 2024-01-16 RX ORDER — CLOPIDOGREL BISULFATE 75 MG/1
75 TABLET, FILM COATED ORAL
Qty: 90 | Refills: 3 | Status: ACTIVE | COMMUNITY
Start: 2024-01-16 | End: 1900-01-01

## 2024-01-16 RX ORDER — ATORVASTATIN CALCIUM 80 MG/1
80 TABLET, FILM COATED ORAL
Qty: 90 | Refills: 3 | Status: ACTIVE | COMMUNITY
Start: 2024-01-16 | End: 1900-01-01

## 2024-01-17 ENCOUNTER — OUTPATIENT (OUTPATIENT)
Dept: OUTPATIENT SERVICES | Facility: HOSPITAL | Age: 79
LOS: 1 days | End: 2024-01-17
Payer: MEDICARE

## 2024-01-17 PROCEDURE — 85027 COMPLETE CBC AUTOMATED: CPT

## 2024-01-17 PROCEDURE — 80061 LIPID PANEL: CPT

## 2024-01-17 PROCEDURE — 84443 ASSAY THYROID STIM HORMONE: CPT

## 2024-01-17 PROCEDURE — 80053 COMPREHEN METABOLIC PANEL: CPT

## 2024-01-17 PROCEDURE — 83036 HEMOGLOBIN GLYCOSYLATED A1C: CPT

## 2024-01-18 ENCOUNTER — NON-APPOINTMENT (OUTPATIENT)
Age: 79
End: 2024-01-18

## 2024-01-18 LAB
ALBUMIN SERPL ELPH-MCNC: 4.2 G/DL
ALP BLD-CCNC: 142 U/L
ALT SERPL-CCNC: 12 U/L
ANION GAP SERPL CALC-SCNC: 13 MMOL/L
AST SERPL-CCNC: 17 U/L
BASOPHILS # BLD AUTO: 0.07 K/UL
BASOPHILS NFR BLD AUTO: 0.7 %
BILIRUB SERPL-MCNC: 0.6 MG/DL
BUN SERPL-MCNC: 21 MG/DL
CALCIUM SERPL-MCNC: 9.8 MG/DL
CHLORIDE SERPL-SCNC: 107 MMOL/L
CHOLEST SERPL-MCNC: 125 MG/DL
CO2 SERPL-SCNC: 24 MMOL/L
CREAT SERPL-MCNC: 1.1 MG/DL
EGFR: 52 ML/MIN/1.73M2
EOSINOPHIL # BLD AUTO: 0.23 K/UL
EOSINOPHIL NFR BLD AUTO: 2.3 %
ESTIMATED AVERAGE GLUCOSE: 103 MG/DL
ESTIMATED AVERAGE GLUCOSE: 111 MG/DL
GLUCOSE SERPL-MCNC: 99 MG/DL
HBA1C MFR BLD HPLC: 5.2 %
HBA1C MFR BLD HPLC: 5.5 %
HCT VFR BLD CALC: 45.4 %
HDLC SERPL-MCNC: 46 MG/DL
HGB BLD-MCNC: 15 G/DL
IMM GRANULOCYTES NFR BLD AUTO: 0.4 %
LDLC SERPL CALC-MCNC: 60 MG/DL
LYMPHOCYTES # BLD AUTO: 1.65 K/UL
LYMPHOCYTES NFR BLD AUTO: 16.3 %
MAN DIFF?: NORMAL
MCHC RBC-ENTMCNC: 29.9 PG
MCHC RBC-ENTMCNC: 33 G/DL
MCV RBC AUTO: 90.4 FL
MONOCYTES # BLD AUTO: 0.81 K/UL
MONOCYTES NFR BLD AUTO: 8 %
NEUTROPHILS # BLD AUTO: 7.34 K/UL
NEUTROPHILS NFR BLD AUTO: 72.3 %
NONHDLC SERPL-MCNC: 79 MG/DL
PLATELET # BLD AUTO: 235 K/UL
POTASSIUM SERPL-SCNC: 4 MMOL/L
PROT SERPL-MCNC: 7.4 G/DL
RBC # BLD: 5.02 M/UL
RBC # FLD: 14.2 %
SODIUM SERPL-SCNC: 144 MMOL/L
TRIGL SERPL-MCNC: 95 MG/DL
WBC # FLD AUTO: 10.14 K/UL

## 2024-01-22 DIAGNOSIS — Z86.73 PERSONAL HISTORY OF TRANSIENT ISCHEMIC ATTACK (TIA), AND CEREBRAL INFARCTION WITHOUT RESIDUAL DEFICITS: ICD-10-CM

## 2024-01-23 DIAGNOSIS — Z86.73 PERSONAL HISTORY OF TRANSIENT ISCHEMIC ATTACK (TIA), AND CEREBRAL INFARCTION WITHOUT RESIDUAL DEFICITS: ICD-10-CM

## 2024-01-30 LAB
ALBUMIN SERPL ELPH-MCNC: 4.3 G/DL
ALP BLD-CCNC: 149 U/L
ALT SERPL-CCNC: 16 U/L
ANION GAP SERPL CALC-SCNC: 16 MMOL/L
AST SERPL-CCNC: 16 U/L
BILIRUB SERPL-MCNC: 0.6 MG/DL
BUN SERPL-MCNC: 22 MG/DL
CALCIUM SERPL-MCNC: 9.9 MG/DL
CHLORIDE SERPL-SCNC: 107 MMOL/L
CHOLEST SERPL-MCNC: 119 MG/DL
CO2 SERPL-SCNC: 21 MMOL/L
CREAT SERPL-MCNC: 1.14 MG/DL
EGFR: 50 ML/MIN/1.73M2
GLUCOSE SERPL-MCNC: 99 MG/DL
HDLC SERPL-MCNC: 46 MG/DL
LDLC SERPL CALC-MCNC: 55 MG/DL
NONHDLC SERPL-MCNC: 73 MG/DL
POTASSIUM SERPL-SCNC: 3.9 MMOL/L
PROT SERPL-MCNC: 7.4 G/DL
SODIUM SERPL-SCNC: 145 MMOL/L
TRIGL SERPL-MCNC: 93 MG/DL
TSH SERPL-ACNC: 1.78 UIU/ML

## 2024-04-16 NOTE — CONSULT NOTE ADULT - NS ATTEND AMEND GEN_ALL_CORE FT
Addended by: FELICIA WOOD on: 4/16/2024 01:07 PM     Modules accepted: Orders     Seen and examined the patient on 8/11 since patient was at MRI on 8/10. Plan was discussed with me on 8/10 in the morning. Briefly 78 year old woman with history of recurrent UTI presented with acute confusion on 8/9 at night. Overnight CT head showed multiple infarct and intracranial stenosis/occlusion. Requested Brain MRI which showed acute infarcts in the left temporal, parietal and left insular cortex and Psubacute/acute infarct in the right frontal subcortical region superimposed on small chronic lacunar infarcts. CTA showed focal occlusion in the proximal inferior M2 branch of the left MCA  with diminutive enhancement in the distal branches and Irregular stenosis involving multiple M2 branches of the right MCA   and distal right TUCKER and multifocal stenosis in the bilateral posterior cerebral arteries. Patient was transferred to ICU for q4h neuro check and loaded with Plavix. The etiology of acute and subacute infarcts in both hemispheres are likely thromboembolic event in the setting of ICAD but cardioembolic event needs to be ruled out. NIH score improved from 4 to 2 today. No focal weakness. Patient has expressive aphasia and possible right HH. Continue tele, TTE and GERARDO and patient needs MCOT/ILR upon discharge. Continue DAPT and Lipitor. Low suspicious for vasculitis given the normal ESR but recommend to check systemic inflammatory marker.
